# Patient Record
Sex: MALE | Race: OTHER | URBAN - METROPOLITAN AREA
[De-identification: names, ages, dates, MRNs, and addresses within clinical notes are randomized per-mention and may not be internally consistent; named-entity substitution may affect disease eponyms.]

---

## 2019-07-19 ENCOUNTER — INPATIENT (INPATIENT)
Facility: HOSPITAL | Age: 34
LOS: 9 days | Discharge: HOME | End: 2019-07-29
Attending: INTERNAL MEDICINE | Admitting: INTERNAL MEDICINE
Payer: MEDICAID

## 2019-07-19 VITALS
HEART RATE: 135 BPM | WEIGHT: 244.93 LBS | RESPIRATION RATE: 22 BRPM | DIASTOLIC BLOOD PRESSURE: 85 MMHG | SYSTOLIC BLOOD PRESSURE: 132 MMHG | OXYGEN SATURATION: 97 % | TEMPERATURE: 101 F | HEIGHT: 74 IN

## 2019-07-19 DIAGNOSIS — A41.9 SEPSIS, UNSPECIFIED ORGANISM: ICD-10-CM

## 2019-07-19 DIAGNOSIS — R19.7 DIARRHEA, UNSPECIFIED: ICD-10-CM

## 2019-07-19 DIAGNOSIS — E87.1 HYPO-OSMOLALITY AND HYPONATREMIA: ICD-10-CM

## 2019-07-19 DIAGNOSIS — R50.9 FEVER, UNSPECIFIED: ICD-10-CM

## 2019-07-19 DIAGNOSIS — J18.1 LOBAR PNEUMONIA, UNSPECIFIED ORGANISM: ICD-10-CM

## 2019-07-19 LAB
ALBUMIN SERPL ELPH-MCNC: 3.4 G/DL — SIGNIFICANT CHANGE UP (ref 3.3–5)
ALP SERPL-CCNC: 76 U/L — SIGNIFICANT CHANGE UP (ref 40–120)
ALT FLD-CCNC: 43 U/L — SIGNIFICANT CHANGE UP (ref 12–78)
ANION GAP SERPL CALC-SCNC: 9 MMOL/L — SIGNIFICANT CHANGE UP (ref 5–17)
APTT BLD: 29.1 SEC — SIGNIFICANT CHANGE UP (ref 27.5–36.3)
AST SERPL-CCNC: 26 U/L — SIGNIFICANT CHANGE UP (ref 15–37)
BASOPHILS # BLD AUTO: 0.02 K/UL — SIGNIFICANT CHANGE UP (ref 0–0.2)
BASOPHILS NFR BLD AUTO: 0.2 % — SIGNIFICANT CHANGE UP (ref 0–2)
BILIRUB SERPL-MCNC: 0.7 MG/DL — SIGNIFICANT CHANGE UP (ref 0.2–1.2)
BUN SERPL-MCNC: 10 MG/DL — SIGNIFICANT CHANGE UP (ref 7–23)
CALCIUM SERPL-MCNC: 8.8 MG/DL — SIGNIFICANT CHANGE UP (ref 8.5–10.1)
CHLORIDE SERPL-SCNC: 98 MMOL/L — SIGNIFICANT CHANGE UP (ref 96–108)
CO2 SERPL-SCNC: 24 MMOL/L — SIGNIFICANT CHANGE UP (ref 22–31)
CREAT SERPL-MCNC: 1.54 MG/DL — HIGH (ref 0.5–1.3)
EOSINOPHIL # BLD AUTO: 0 K/UL — SIGNIFICANT CHANGE UP (ref 0–0.5)
EOSINOPHIL NFR BLD AUTO: 0 % — SIGNIFICANT CHANGE UP (ref 0–6)
GLUCOSE SERPL-MCNC: 155 MG/DL — HIGH (ref 70–99)
HCT VFR BLD CALC: 44.4 % — SIGNIFICANT CHANGE UP (ref 39–50)
HGB BLD-MCNC: 14.6 G/DL — SIGNIFICANT CHANGE UP (ref 13–17)
IMM GRANULOCYTES NFR BLD AUTO: 1.1 % — SIGNIFICANT CHANGE UP (ref 0–1.5)
INR BLD: 1.34 RATIO — HIGH (ref 0.88–1.16)
LACTATE SERPL-SCNC: 1.5 MMOL/L — SIGNIFICANT CHANGE UP (ref 0.7–2)
LACTATE SERPL-SCNC: 3.5 MMOL/L — HIGH (ref 0.7–2)
LYMPHOCYTES # BLD AUTO: 0.86 K/UL — LOW (ref 1–3.3)
LYMPHOCYTES # BLD AUTO: 7 % — LOW (ref 13–44)
MCHC RBC-ENTMCNC: 28.8 PG — SIGNIFICANT CHANGE UP (ref 27–34)
MCHC RBC-ENTMCNC: 32.9 GM/DL — SIGNIFICANT CHANGE UP (ref 32–36)
MCV RBC AUTO: 87.6 FL — SIGNIFICANT CHANGE UP (ref 80–100)
MONOCYTES # BLD AUTO: 0.73 K/UL — SIGNIFICANT CHANGE UP (ref 0–0.9)
MONOCYTES NFR BLD AUTO: 6 % — SIGNIFICANT CHANGE UP (ref 2–14)
NEUTROPHILS # BLD AUTO: 10.51 K/UL — HIGH (ref 1.8–7.4)
NEUTROPHILS NFR BLD AUTO: 85.7 % — HIGH (ref 43–77)
NRBC # BLD: 0 /100 WBCS — SIGNIFICANT CHANGE UP (ref 0–0)
PLATELET # BLD AUTO: 142 K/UL — LOW (ref 150–400)
POTASSIUM SERPL-MCNC: 3.3 MMOL/L — LOW (ref 3.5–5.3)
POTASSIUM SERPL-SCNC: 3.3 MMOL/L — LOW (ref 3.5–5.3)
PROT SERPL-MCNC: 8.4 GM/DL — HIGH (ref 6–8.3)
PROTHROM AB SERPL-ACNC: 15.1 SEC — HIGH (ref 10–12.9)
RBC # BLD: 5.07 M/UL — SIGNIFICANT CHANGE UP (ref 4.2–5.8)
RBC # FLD: 13.4 % — SIGNIFICANT CHANGE UP (ref 10.3–14.5)
SODIUM SERPL-SCNC: 131 MMOL/L — LOW (ref 135–145)
WBC # BLD: 12.25 K/UL — HIGH (ref 3.8–10.5)
WBC # FLD AUTO: 12.25 K/UL — HIGH (ref 3.8–10.5)

## 2019-07-19 PROCEDURE — 99223 1ST HOSP IP/OBS HIGH 75: CPT

## 2019-07-19 PROCEDURE — 74177 CT ABD & PELVIS W/CONTRAST: CPT | Mod: 26

## 2019-07-19 PROCEDURE — 99285 EMERGENCY DEPT VISIT HI MDM: CPT

## 2019-07-19 PROCEDURE — 71045 X-RAY EXAM CHEST 1 VIEW: CPT | Mod: 26

## 2019-07-19 RX ORDER — POTASSIUM CHLORIDE 20 MEQ
40 PACKET (EA) ORAL ONCE
Refills: 0 | Status: COMPLETED | OUTPATIENT
Start: 2019-07-19 | End: 2019-07-19

## 2019-07-19 RX ORDER — SODIUM CHLORIDE 9 MG/ML
1000 INJECTION INTRAMUSCULAR; INTRAVENOUS; SUBCUTANEOUS
Refills: 0 | Status: DISCONTINUED | OUTPATIENT
Start: 2019-07-19 | End: 2019-07-20

## 2019-07-19 RX ORDER — SODIUM CHLORIDE 9 MG/ML
3400 INJECTION INTRAMUSCULAR; INTRAVENOUS; SUBCUTANEOUS ONCE
Refills: 0 | Status: COMPLETED | OUTPATIENT
Start: 2019-07-19 | End: 2019-07-19

## 2019-07-19 RX ORDER — NICOTINE POLACRILEX 2 MG
1 GUM BUCCAL DAILY
Refills: 0 | Status: DISCONTINUED | OUTPATIENT
Start: 2019-07-19 | End: 2019-07-19

## 2019-07-19 RX ORDER — PIPERACILLIN AND TAZOBACTAM 4; .5 G/20ML; G/20ML
3.38 INJECTION, POWDER, LYOPHILIZED, FOR SOLUTION INTRAVENOUS ONCE
Refills: 0 | Status: COMPLETED | OUTPATIENT
Start: 2019-07-19 | End: 2019-07-19

## 2019-07-19 RX ORDER — PIPERACILLIN AND TAZOBACTAM 4; .5 G/20ML; G/20ML
3.38 INJECTION, POWDER, LYOPHILIZED, FOR SOLUTION INTRAVENOUS EVERY 8 HOURS
Refills: 0 | Status: DISCONTINUED | OUTPATIENT
Start: 2019-07-19 | End: 2019-07-21

## 2019-07-19 RX ORDER — IBUPROFEN 200 MG
400 TABLET ORAL ONCE
Refills: 0 | Status: COMPLETED | OUTPATIENT
Start: 2019-07-19 | End: 2019-07-19

## 2019-07-19 RX ORDER — SODIUM CHLORIDE 9 MG/ML
1000 INJECTION INTRAMUSCULAR; INTRAVENOUS; SUBCUTANEOUS ONCE
Refills: 0 | Status: COMPLETED | OUTPATIENT
Start: 2019-07-19 | End: 2019-07-19

## 2019-07-19 RX ORDER — AZITHROMYCIN 500 MG/1
500 TABLET, FILM COATED ORAL ONCE
Refills: 0 | Status: COMPLETED | OUTPATIENT
Start: 2019-07-19 | End: 2019-07-19

## 2019-07-19 RX ORDER — IOHEXOL 300 MG/ML
30 INJECTION, SOLUTION INTRAVENOUS ONCE
Refills: 0 | Status: COMPLETED | OUTPATIENT
Start: 2019-07-19 | End: 2019-07-19

## 2019-07-19 RX ORDER — AZITHROMYCIN 500 MG/1
500 TABLET, FILM COATED ORAL EVERY 24 HOURS
Refills: 0 | Status: DISCONTINUED | OUTPATIENT
Start: 2019-07-20 | End: 2019-07-21

## 2019-07-19 RX ORDER — NICOTINE POLACRILEX 2 MG
1 GUM BUCCAL DAILY
Refills: 0 | Status: DISCONTINUED | OUTPATIENT
Start: 2019-07-19 | End: 2019-07-29

## 2019-07-19 RX ORDER — ACETAMINOPHEN 500 MG
650 TABLET ORAL ONCE
Refills: 0 | Status: COMPLETED | OUTPATIENT
Start: 2019-07-19 | End: 2019-07-19

## 2019-07-19 RX ORDER — ACETAMINOPHEN 500 MG
650 TABLET ORAL EVERY 6 HOURS
Refills: 0 | Status: DISCONTINUED | OUTPATIENT
Start: 2019-07-19 | End: 2019-07-29

## 2019-07-19 RX ORDER — IPRATROPIUM/ALBUTEROL SULFATE 18-103MCG
3 AEROSOL WITH ADAPTER (GRAM) INHALATION EVERY 6 HOURS
Refills: 0 | Status: DISCONTINUED | OUTPATIENT
Start: 2019-07-19 | End: 2019-07-28

## 2019-07-19 RX ADMIN — SODIUM CHLORIDE 100 MILLILITER(S): 9 INJECTION INTRAMUSCULAR; INTRAVENOUS; SUBCUTANEOUS at 20:10

## 2019-07-19 RX ADMIN — AZITHROMYCIN 255 MILLIGRAM(S): 500 TABLET, FILM COATED ORAL at 20:02

## 2019-07-19 RX ADMIN — Medication 200 MILLIGRAM(S): at 23:16

## 2019-07-19 RX ADMIN — Medication 650 MILLIGRAM(S): at 18:45

## 2019-07-19 RX ADMIN — IOHEXOL 30 MILLILITER(S): 300 INJECTION, SOLUTION INTRAVENOUS at 16:30

## 2019-07-19 RX ADMIN — PIPERACILLIN AND TAZOBACTAM 200 GRAM(S): 4; .5 INJECTION, POWDER, LYOPHILIZED, FOR SOLUTION INTRAVENOUS at 18:44

## 2019-07-19 RX ADMIN — SODIUM CHLORIDE 1000 MILLILITER(S): 9 INJECTION INTRAMUSCULAR; INTRAVENOUS; SUBCUTANEOUS at 19:08

## 2019-07-19 RX ADMIN — Medication 40 MILLIEQUIVALENT(S): at 20:52

## 2019-07-19 RX ADMIN — Medication 400 MILLIGRAM(S): at 23:16

## 2019-07-19 RX ADMIN — Medication 650 MILLIGRAM(S): at 16:46

## 2019-07-19 RX ADMIN — Medication 650 MILLIGRAM(S): at 20:10

## 2019-07-19 RX ADMIN — SODIUM CHLORIDE 6800 MILLILITER(S): 9 INJECTION INTRAMUSCULAR; INTRAVENOUS; SUBCUTANEOUS at 16:46

## 2019-07-19 NOTE — H&P ADULT - PROBLEM SELECTOR PLAN 1
- blood and urine culture.  - IVF hydration  - IV antibiotics.  - pulmonary eval  - serial lactic acid level

## 2019-07-19 NOTE — ED ADULT NURSE NOTE - OBJECTIVE STATEMENT
Pt c/o upper abdominal pain with nausea denies vomiting with diarrhea for 2 days. Pt c/o fever with chills, c/o unproductive coughing at night. pt c/o lower back pain, denies any injury

## 2019-07-19 NOTE — SBIRT NOTE ADULT - NSSBIRTBRIEFINTDET_GEN_A_CORE
educated pt on healthy guidelines and that he is an the borderline of  at risk behaviors. Pt was receptive to and appreciative of information and motivated to make changes that were reviewed as well.

## 2019-07-19 NOTE — ED ADULT NURSE NOTE - NSIMPLEMENTINTERV_GEN_ALL_ED
Implemented All Universal Safety Interventions:  [place] to call system. Call bell, personal items and telephone within reach. Instruct patient to call for assistance. Room bathroom lighting operational. Non-slip footwear when patient is off stretcher. Physically safe environment: no spills, clutter or unnecessary equipment. Stretcher in lowest position, wheels locked, appropriate side rails in place.

## 2019-07-19 NOTE — H&P ADULT - HISTORY OF PRESENT ILLNESS
33 years old male here c/o fever nausea and non-bloody diarrhea with diffused abd pain since yesterday. Pt denies headache, dizziness, blurred vision, light sensitivities, focal/distal weakness or numbness, neck/back pain, cough, sob, chest pain, vomiting, dysuria, hematuria or irregular bowel movements. Pt also denies recent traveling.   CXR showed RLL infiltrates, Abd CT revealed RLL PNA, he was started on IVF, IV antibiotics.

## 2019-07-19 NOTE — SBIRT NOTE ADULT - NSSBIRTDRGBRIEFINTDET_GEN_A_CORE
education provided and reviewed medical risk of smoking with PNA. Pt seemed surprised and added that he should "probably quit smoking cigarettes then, too".  Smoking cessaTION STRATEGIES WERE ALSO REVIEWED.

## 2019-07-19 NOTE — ED ADULT NURSE NOTE - ED STAT RN HANDOFF DETAILS
Report received from LIANNA Raymond at 7pm. Assessment available on [facility]. will continue to monitor.

## 2019-07-19 NOTE — H&P ADULT - NSHPPHYSICALEXAM_GEN_ALL_CORE
GENERAL: NAD, well-groomed, well-developed  HEAD:  Atraumatic, Normocephalic  EYES: EOMI, PERRLA, conjunctiva and sclera clear  ENMT: No tonsillar erythema, exudates, or enlargement; Moist mucous membranes   NECK: Supple, No JVD   NERVOUS SYSTEM:  Alert & Oriented X3, Good concentration; Motor Strength 5/5 B/L upper and lower extremities; DTRs 2+ intact and symmetric  CHEST/LUNG: Clear to auscultation  bilaterally; No rales, rhonchi, wheezing, or rubs  HEART: Regular rate and rhythm; No murmurs, rubs, or gallops  ABDOMEN: Soft, Nontender, Nondistended; Bowel sounds present  EXTREMITIES:  2+ Peripheral Pulses, No clubbing, cyanosis, or edema  LYMPH: No lymphadenopathy noted  SKIN: No rashes or lesions

## 2019-07-19 NOTE — H&P ADULT - NSHPREVIEWOFSYSTEMS_GEN_ALL_CORE
REVIEW OF SYSTEMS:  CONSTITUTIONAL:   fatigue  EYES: No eye pain, visual disturbances, or discharge  ENMT:  No difficulty hearing, tinnitus, vertigo; No sinus or throat pain  NECK: No pain or stiffness  RESPIRATORY: No cough, wheezing, chills or hemoptysis; No shortness of breath  CARDIOVASCULAR: No chest pain, palpitations, dizziness, or leg swelling  GASTROINTESTINAL:  diarrhea. No melena or hematochezia.  GENITOURINARY: No dysuria, frequency, hematuria, or incontinence  NEUROLOGICAL: No headaches, memory loss, loss of strength, numbness, or tremors  SKIN: No itching, burning, rashes, or lesions

## 2019-07-19 NOTE — ED ADULT TRIAGE NOTE - CHIEF COMPLAINT QUOTE
Pt with fever,  feeling achy, dizziness, weakness, and diarrhea " a lot ", since yesterday  Pain to knees, lower back pain and unable to sleep for 2 days Nausea this am

## 2019-07-19 NOTE — ED PROVIDER NOTE - OBJECTIVE STATEMENT
33 years old male here c/o fever nausea and non bloody diarrhea with diffused abd pain since yesterday. Pt denies headache, dizziness, blurred visions, light sensitivities, focal/distal weakness or numbness, neck/back pain, cough, sob, chest pain, vomiting, dysuria, hematuria or irregular bowel movements. Pt also denies recent traveling.

## 2019-07-20 DIAGNOSIS — R06.02 SHORTNESS OF BREATH: ICD-10-CM

## 2019-07-20 DIAGNOSIS — Z29.9 ENCOUNTER FOR PROPHYLACTIC MEASURES, UNSPECIFIED: ICD-10-CM

## 2019-07-20 DIAGNOSIS — E87.6 HYPOKALEMIA: ICD-10-CM

## 2019-07-20 LAB
ALBUMIN SERPL ELPH-MCNC: 2.8 G/DL — LOW (ref 3.3–5)
ALP SERPL-CCNC: 63 U/L — SIGNIFICANT CHANGE UP (ref 40–120)
ALT FLD-CCNC: 43 U/L — SIGNIFICANT CHANGE UP (ref 12–78)
ANION GAP SERPL CALC-SCNC: 12 MMOL/L — SIGNIFICANT CHANGE UP (ref 5–17)
AST SERPL-CCNC: 60 U/L — HIGH (ref 15–37)
BASE EXCESS BLDA CALC-SCNC: -2.8 MMOL/L — LOW (ref -2–2)
BILIRUB SERPL-MCNC: 0.8 MG/DL — SIGNIFICANT CHANGE UP (ref 0.2–1.2)
BLOOD GAS COMMENTS: SIGNIFICANT CHANGE UP
BLOOD GAS COMMENTS: SIGNIFICANT CHANGE UP
BLOOD GAS SOURCE: SIGNIFICANT CHANGE UP
BUN SERPL-MCNC: 6 MG/DL — LOW (ref 7–23)
CALCIUM SERPL-MCNC: 7.3 MG/DL — LOW (ref 8.5–10.1)
CHLORIDE SERPL-SCNC: 104 MMOL/L — SIGNIFICANT CHANGE UP (ref 96–108)
CHOLEST SERPL-MCNC: 144 MG/DL — SIGNIFICANT CHANGE UP (ref 10–199)
CO2 SERPL-SCNC: 20 MMOL/L — LOW (ref 22–31)
CREAT SERPL-MCNC: 1.1 MG/DL — SIGNIFICANT CHANGE UP (ref 0.5–1.3)
D DIMER BLD IA.RAPID-MCNC: 1238 NG/ML DDU — HIGH
GLUCOSE SERPL-MCNC: 110 MG/DL — HIGH (ref 70–99)
HCO3 BLDA-SCNC: 19 MMOL/L — LOW (ref 21–29)
HCT VFR BLD CALC: 39.8 % — SIGNIFICANT CHANGE UP (ref 39–50)
HDLC SERPL-MCNC: 30 MG/DL — LOW
HGB BLD-MCNC: 13.1 G/DL — SIGNIFICANT CHANGE UP (ref 13–17)
HOROWITZ INDEX BLDA+IHG-RTO: 0.4 — SIGNIFICANT CHANGE UP
LIPID PNL WITH DIRECT LDL SERPL: 70 MG/DL — SIGNIFICANT CHANGE UP
MCHC RBC-ENTMCNC: 28.7 PG — SIGNIFICANT CHANGE UP (ref 27–34)
MCHC RBC-ENTMCNC: 32.9 GM/DL — SIGNIFICANT CHANGE UP (ref 32–36)
MCV RBC AUTO: 87.3 FL — SIGNIFICANT CHANGE UP (ref 80–100)
NRBC # BLD: 0 /100 WBCS — SIGNIFICANT CHANGE UP (ref 0–0)
PCO2 BLDA: 25 MMHG — LOW (ref 32–46)
PH BLD: 7.49 — HIGH (ref 7.35–7.45)
PLATELET # BLD AUTO: 115 K/UL — LOW (ref 150–400)
PO2 BLDA: 70 MMHG — LOW (ref 74–108)
POTASSIUM SERPL-MCNC: 3.4 MMOL/L — LOW (ref 3.5–5.3)
POTASSIUM SERPL-SCNC: 3.4 MMOL/L — LOW (ref 3.5–5.3)
PROCALCITONIN SERPL-MCNC: 2.1 NG/ML — HIGH (ref 0.02–0.1)
PROT SERPL-MCNC: 6.8 GM/DL — SIGNIFICANT CHANGE UP (ref 6–8.3)
RBC # BLD: 4.56 M/UL — SIGNIFICANT CHANGE UP (ref 4.2–5.8)
RBC # FLD: 13.5 % — SIGNIFICANT CHANGE UP (ref 10.3–14.5)
SAO2 % BLDA: 96 % — SIGNIFICANT CHANGE UP (ref 92–96)
SODIUM SERPL-SCNC: 136 MMOL/L — SIGNIFICANT CHANGE UP (ref 135–145)
TOTAL CHOLESTEROL/HDL RATIO MEASUREMENT: 4.8 RATIO — SIGNIFICANT CHANGE UP (ref 3.4–9.6)
TRIGL SERPL-MCNC: 219 MG/DL — HIGH (ref 10–149)
WBC # BLD: 11 K/UL — HIGH (ref 3.8–10.5)
WBC # FLD AUTO: 11 K/UL — HIGH (ref 3.8–10.5)

## 2019-07-20 PROCEDURE — 99221 1ST HOSP IP/OBS SF/LOW 40: CPT | Mod: GC

## 2019-07-20 PROCEDURE — 99233 SBSQ HOSP IP/OBS HIGH 50: CPT

## 2019-07-20 RX ORDER — ENOXAPARIN SODIUM 100 MG/ML
40 INJECTION SUBCUTANEOUS DAILY
Refills: 0 | Status: DISCONTINUED | OUTPATIENT
Start: 2019-07-20 | End: 2019-07-21

## 2019-07-20 RX ORDER — POTASSIUM CHLORIDE 20 MEQ
40 PACKET (EA) ORAL ONCE
Refills: 0 | Status: COMPLETED | OUTPATIENT
Start: 2019-07-20 | End: 2019-07-20

## 2019-07-20 RX ORDER — IBUPROFEN 200 MG
400 TABLET ORAL ONCE
Refills: 0 | Status: COMPLETED | OUTPATIENT
Start: 2019-07-20 | End: 2019-07-20

## 2019-07-20 RX ORDER — METRONIDAZOLE 500 MG
500 TABLET ORAL EVERY 8 HOURS
Refills: 0 | Status: DISCONTINUED | OUTPATIENT
Start: 2019-07-20 | End: 2019-07-21

## 2019-07-20 RX ORDER — SODIUM CHLORIDE 9 MG/ML
1000 INJECTION INTRAMUSCULAR; INTRAVENOUS; SUBCUTANEOUS
Refills: 0 | Status: COMPLETED | OUTPATIENT
Start: 2019-07-20 | End: 2019-07-20

## 2019-07-20 RX ADMIN — Medication 1 PATCH: at 11:42

## 2019-07-20 RX ADMIN — PIPERACILLIN AND TAZOBACTAM 25 GRAM(S): 4; .5 INJECTION, POWDER, LYOPHILIZED, FOR SOLUTION INTRAVENOUS at 13:35

## 2019-07-20 RX ADMIN — Medication 3 MILLILITER(S): at 17:54

## 2019-07-20 RX ADMIN — Medication 200 MILLIGRAM(S): at 11:42

## 2019-07-20 RX ADMIN — Medication 650 MILLIGRAM(S): at 06:30

## 2019-07-20 RX ADMIN — Medication 400 MILLIGRAM(S): at 16:30

## 2019-07-20 RX ADMIN — AZITHROMYCIN 255 MILLIGRAM(S): 500 TABLET, FILM COATED ORAL at 16:34

## 2019-07-20 RX ADMIN — Medication 400 MILLIGRAM(S): at 00:16

## 2019-07-20 RX ADMIN — Medication 500 MILLIGRAM(S): at 22:41

## 2019-07-20 RX ADMIN — Medication 200 MILLIGRAM(S): at 05:58

## 2019-07-20 RX ADMIN — SODIUM CHLORIDE 100 MILLILITER(S): 9 INJECTION INTRAMUSCULAR; INTRAVENOUS; SUBCUTANEOUS at 08:29

## 2019-07-20 RX ADMIN — Medication 650 MILLIGRAM(S): at 20:45

## 2019-07-20 RX ADMIN — PIPERACILLIN AND TAZOBACTAM 25 GRAM(S): 4; .5 INJECTION, POWDER, LYOPHILIZED, FOR SOLUTION INTRAVENOUS at 05:57

## 2019-07-20 RX ADMIN — SODIUM CHLORIDE 80 MILLILITER(S): 9 INJECTION INTRAMUSCULAR; INTRAVENOUS; SUBCUTANEOUS at 20:01

## 2019-07-20 RX ADMIN — Medication 3 MILLILITER(S): at 11:13

## 2019-07-20 RX ADMIN — Medication 650 MILLIGRAM(S): at 20:15

## 2019-07-20 RX ADMIN — SODIUM CHLORIDE 80 MILLILITER(S): 9 INJECTION INTRAMUSCULAR; INTRAVENOUS; SUBCUTANEOUS at 15:13

## 2019-07-20 RX ADMIN — PIPERACILLIN AND TAZOBACTAM 25 GRAM(S): 4; .5 INJECTION, POWDER, LYOPHILIZED, FOR SOLUTION INTRAVENOUS at 22:41

## 2019-07-20 RX ADMIN — Medication 500 MILLIGRAM(S): at 13:35

## 2019-07-20 RX ADMIN — Medication 650 MILLIGRAM(S): at 15:13

## 2019-07-20 RX ADMIN — Medication 650 MILLIGRAM(S): at 05:57

## 2019-07-20 RX ADMIN — Medication 3 MILLILITER(S): at 00:11

## 2019-07-20 RX ADMIN — Medication 30 MILLILITER(S): at 20:01

## 2019-07-20 RX ADMIN — Medication 650 MILLIGRAM(S): at 13:35

## 2019-07-20 RX ADMIN — Medication 40 MILLIEQUIVALENT(S): at 11:42

## 2019-07-20 NOTE — CHART NOTE - NSCHARTNOTEFT_GEN_A_CORE
Patient was evaluated by ICU team, as per ICU eval he is stable to remain on 2D for now.    I was informed by nursing staff that the hypothermia blanket temp control is defective,   the patient's temp may not be accurate, they are working to have it fixed or to get a better one.

## 2019-07-20 NOTE — CONSULT NOTE ADULT - ASSESSMENT
HPI:  33 years old male here c/o fever nausea and non-bloody diarrhea with diffused abd pain since yesterday. Pt denies headache, dizziness, blurred vision, light sensitivities, focal/distal weakness or numbness, neck/back pain, cough, sob, chest pain, vomiting, dysuria, hematuria or irregular bowel movements. Pt also denies recent traveling.   CXR showed RLL infiltrates, Abd CT revealed RLL PNA, he was started on IVF, IV antibiotics. (19 Jul 2019 18:39)  ------------ As Above -----------------------------------  Patient seen earlier today. Patient is a poor historian. Patient had been in his USOH until Wednesday when he developed below rib pain, abdominal pain, back pain, and knee pain. Chills (+). Later that day, he developed diarrhea. Symptoms continued and he came to the ER.  Consult called for diarrhea.  His girlfriend does not have the same symptoms ( ate the same foods ) Sister has a cold. No recent antibiotics. No recent travel. Non bloody.  Prior to Wednesday, the patient denies melena, hematochezia, hematemesis, nausea, vomiting, abdominal pain, constipation, diarrhea, or change in bowel movements No family history of  IBD  In the hospital, he was started on Zithromax, Flagyl PO and Zosyn.   Stool studies pending  ( Note : Patient denies SOB on admission until the very end of the consultation < nasal cannula > )     Diarrhea - r/o infectious , doubt IBD, ischemiea, etc. - Stool for C Diff, O&P, CX pending. 1) Check stool samples 2)Pepto TID x 3 3) clear liquid diet

## 2019-07-20 NOTE — PROGRESS NOTE ADULT - SUBJECTIVE AND OBJECTIVE BOX
Patient is a 33y old  Male who presents with a chief complaint of SOB, PNA, complain of feeling SOB, having more diarrhea, and knee pain.	     OVERNIGHT EVENTS: none    MEDICATIONS  (STANDING):  ALBUTerol/ipratropium for Nebulization 3 milliLiter(s) Nebulizer every 6 hours  azithromycin  IVPB 500 milliGRAM(s) IV Intermittent every 24 hours  metroNIDAZOLE    Tablet 500 milliGRAM(s) Oral every 8 hours  nicotine -  14 mG/24Hr(s) Patch 1 patch Transdermal daily  piperacillin/tazobactam IVPB.. 3.375 Gram(s) IV Intermittent every 8 hours  sodium chloride 0.9%. 1000 milliLiter(s) (100 mL/Hr) IV Continuous <Continuous>    MEDICATIONS  (PRN):  acetaminophen   Tablet .. 650 milliGRAM(s) Oral every 6 hours PRN Temp greater or equal to 38C (100.4F), Mild Pain (1 - 3)  guaiFENesin    Syrup 200 milliGRAM(s) Oral every 6 hours PRN Cough        REVIEW OF SYSTEMS:  CONSTITUTIONAL: No fever, weight loss, or fatigue  EYES: No eye pain, visual disturbances, or discharge  ENMT:  No difficulty hearing, tinnitus, vertigo;    NECK: No pain or stiffness  RESPIRATORY: No cough, wheezing, chills or hemoptysis; No shortness of breath  CARDIOVASCULAR: No chest pain, palpitations, dizziness, or leg swelling  GASTROINTESTINAL:  DIARRHEA  GENITOURINARY: No dysuria, frequency,    NEUROLOGICAL: No headaches,   SKIN: No itching, burning, rashes, or lesions      Vital Signs Last 24 Hrs  T(C): 39.3 (20 Jul 2019 05:21), Max: 39.5 (19 Jul 2019 22:00)  T(F): 102.8 (20 Jul 2019 05:21), Max: 103.1 (19 Jul 2019 22:00)  HR: 113 (20 Jul 2019 05:21) (107 - 135)  BP: 127/82 (20 Jul 2019 05:21) (118/69 - 142/79)  BP(mean): --  RR: 18 (20 Jul 2019 05:21) (16 - 22)  SpO2: 94% (20 Jul 2019 05:21) (94% - 97%)    PHYSICAL EXAM:  GENERAL: NAD, well-groomed, well-developed  HEAD:  Atraumatic, Normocephalic  EYES: EOMI, PERRLA, conjunctiva and sclera clear  ENMT: No tonsillar erythema, exudates, or enlargement; Moist mucous membranes   NECK: Supple, No JVD   NERVOUS SYSTEM:  Alert & Oriented X3, Good concentration; Motor Strength 5/5 B/L upper and lower extremities; DTRs 2+ intact and symmetric  CHEST/LUNG: Decreased breath sounds at bases, no rales, rhonchi, wheezing, or rubs  HEART: Regular rate and rhythm; No murmurs, rubs, or gallops  ABDOMEN: Soft, Nontender, Nondistended; Bowel sounds present  EXTREMITIES:  2+ Peripheral Pulses, No clubbing, cyanosis, or edema  LYMPH: No lymphadenopathy noted  SKIN: No rashes or lesions    LABS:                        13.1   11.00 )-----------( 115      ( 20 Jul 2019 07:11 )             39.8     07-20    136  |  104  |  6<L>  ----------------------------<  110<H>  3.4<L>   |  20<L>  |  1.10    Ca    7.3<L>      20 Jul 2019 07:11    TPro  6.8  /  Alb  2.8<L>  /  TBili  0.8  /  DBili  x   /  AST  60<H>  /  ALT  43  /  AlkPhos  63  07-20    PT/INR - ( 19 Jul 2019 16:37 )   PT: 15.1 sec;   INR: 1.34 ratio         PTT - ( 19 Jul 2019 16:37 )  PTT:29.1 sec   cardiac markers     CAPILLARY BLOOD GLUCOSE        Cultures    RADIOLOGY & ADDITIONAL TESTS:    Imaging Personally Reviewed:  [ ] YES  [ ] NO    Consultant(s) Notes Reviewed:  [ ] YES  [ ] NO    Care Discussed with Consultants/Other Providers [ ] YES  [ ] NO

## 2019-07-20 NOTE — CONSULT NOTE ADULT - ASSESSMENT
Assessment:   Sepsis due to pneumonia: on zosyn, flagyl and azithromycin.   Elevated temperature  Respiratory distress that improved with BIPAP        Plan:   Continue BIPAP as pt feels better  FiO2 increased to 50 % to maintain saturation around 97 %  Tylenol and/ or motrin as needed for fever.   Cooling blanket for elevated temp.   Continue abx and iv fluids as needed.   Patient doesn't meet criteria to be in ICU at this time, if condition worsens please recall ICU. Assessment:   Sepsis due to pneumonia: on zosyn, flagyl and azithromycin.   Elevated temperature  Respiratory distress that improved with BIPAP        Recommedation:   Continue BIPAP as pt feels better with it on.   FiO2 increased to 50 % to maintain saturation around 97 %  Tylenol and/ or motrin as needed for fever.   Cooling blanket for elevated temp.   Continue abx and iv fluids as needed.   Patient doesn't meet criteria to be in ICU at this time, if condition worsens please recall ICU.

## 2019-07-20 NOTE — CONSULT NOTE ADULT - ATTENDING COMMENTS
Pt seen and examined on medical floor 7/20.    33M with no PMH, active cigarette smoker, admits to snorting cocaine 1 month ago recreationally, and uses marijuana presents for 4-5 days of bilateral knee arthralgias and myalgias now with 1 day hx of SOB, cough, and hemoptysis reported by pt. Pt also reports loose stool. Found to be febrile. CXR with R infiltrate. Hypoxic with tachypnea placed on biPAP by medical team. Treated for sepsis secondary to PNA with hypoxic respiratory failure.     - pt currently awake, responsive in no distress on biPAP  - FIO2 40% with O2 sat in the low 90s.   - Fio2 increased to 50% with O2 sat improvement to 96-98%  - pt reports breathing improved with BiPAP, talking full sentences  - pt on azithromycin and zosyn  - would check sputum cx  - blood and u cx as well  - hemodynamically stable at present time in no respiratory distress on non invasive ventilation  - pulmonary follow up  - antipyretics with tylenol and motrin, cooling blanket  - no need for ICU transfer at present time, reconsult for any changes in condition

## 2019-07-20 NOTE — PATIENT PROFILE ADULT - NSPRESCRUSEDDRG_GEN_A_NUR
I have reviewed and confirmed nurses' notes for patient's medications, allergies, medical history, and surgical history.
No

## 2019-07-20 NOTE — CONSULT NOTE ADULT - SUBJECTIVE AND OBJECTIVE BOX
Patient is a 33y old  Male who presents with a chief complaint of admitted to hospital for pneumonia (20 Jul 2019 16:22)    HPI:  33 years old male with no significant medical history here c/o fever nausea and non-bloody diarrhea with diffused abd pain for one day. Started about 4 days prior to that with right lower chest, back and knee pain along with profuse diarrhea. Also with diffuse body ache, sweating and chills. Then started with sob.  Admitted with RLL pneumonia.  07/20/19: With increasing Respiratory distress, temperature up to 104 with chills. RRT was called, got started on BIPAP support with some improvement.   Active smoker 1 ppd. uses alcohol and Marijuana socially.    PAST MEDICAL & SURGICAL HISTORY:  No pertinent family history  No significant past surgical history    FAMILY HISTORY:  FH: HTN (hypertension)    SOCIAL HISTORY: BMI (kg/m2): 29.6 .active smoker.    Allergies  No Known Allergies    MEDICATIONS  (STANDING):  ALBUTerol/ipratropium for Nebulization 3 milliLiter(s) Nebulizer every 6 hours  azithromycin  IVPB 500 milliGRAM(s) IV Intermittent every 24 hours  metroNIDAZOLE    Tablet 500 milliGRAM(s) Oral every 8 hours  nicotine -  14 mG/24Hr(s) Patch 1 patch Transdermal daily  piperacillin/tazobactam IVPB.. 3.375 Gram(s) IV Intermittent every 8 hours  sodium chloride 0.9%. 1000 milliLiter(s) (80 mL/Hr) IV Continuous <Continuous>    MEDICATIONS  (PRN):  acetaminophen   Tablet .. 650 milliGRAM(s) Oral every 6 hours PRN Temp greater or equal to 38C (100.4F), Mild Pain (1 - 3)  guaiFENesin    Syrup 200 milliGRAM(s) Oral every 6 hours PRN Cough    REVIEW OF SYSTEMS:    Constitutional:            fever, and  fatigue  HEENT:                       No difficulty hearing, tinnitus, vertigo; No sinus or throat pain  Respiratory:               sob, chest pain.  Cardiovascular:           chest pain,   Gastrointestinal:        abdominal and  epigastric pain. diarrhea  Genitourinary:            No dysuria, frequency, hematuria or incontinence  SKIN:                          no rash  Musculoskeletal:        knee pain  Extremities:                No swelling  Neurological:              No headaches  Psychiatric:                 No depression, anxiety    Vital Signs Last 24 Hrs  T(C): 39.4 (20 Jul 2019 18:49), Max: 40.2 (20 Jul 2019 13:33)  T(F): 102.9 (20 Jul 2019 18:49), Max: 104.4 (20 Jul 2019 13:33)  HR: 107 (20 Jul 2019 18:49) (104 - 120)  BP: 131/62 (20 Jul 2019 18:49) (118/69 - 147/84)  BP(mean): --  RR: 22 (20 Jul 2019 18:49) (16 - 22)  SpO2: 93% (20 Jul 2019 18:49) (93% - 97%)    PHYSICAL EXAM:  GEN:        Awake, responsive and comfortable.  HEENT:     BIPAP.   RESP:       no wheezing.  CVS:          Regular rate and rhythm.   ABD:         Soft, non-tender, non-distended;   :             No costovertebral angle tenderness  SKIN:           Warm and dry.  EXTR:            No clubbing, cyanosis or edema  CNS:              Intact sensory and motor function.  PSYCH:        cooperative, no anxiety or depression    LABS:                        13.1   11.00 )-----------( 115      ( 20 Jul 2019 07:11 )             39.8     07-20    136  |  104  |  6<L>  ----------------------------<  110<H>  3.4<L>   |  20<L>  |  1.10    Ca    7.3<L>      20 Jul 2019 07:11    TPro  6.8  /  Alb  2.8<L>  /  TBili  0.8  /  DBili  x   /  AST  60<H>  /  ALT  43  /  AlkPhos  63  07-20    PT/INR - ( 19 Jul 2019 16:37 )   PT: 15.1 sec;   INR: 1.34 ratio      PTT - ( 19 Jul 2019 16:37 )  PTT:29.1 sec  07-20 @ 15:06  pH: 7.49  pCO2: 25  pO2: 70  SaO2: 96    EKG:  sinus tachycardia.    RADIOLOGY & ADDITIONAL STUDIES:  < from: CT Abdomen and Pelvis w/ Oral Cont and w/ IV Cont (07.19.19 @ 17:54) >    EXAM:  CT ABDOMEN AND PELVIS OC IC                          PROCEDURE DATE:  07/19/2019      INTERPRETATION:  CLINICAL INFORMATION: Fever and abdominal pain, diarrhea    COMPARISON: None.    PROCEDURE:   CT of the Abdomen and Pelvis was performed with intravenous contrast.   Intravenous contrast: 90 ml Omnipaque 350. 10 ml discarded.  Oral contrast: positive contrast was administered.  Sagittal and coronal reformats were performed.    FINDINGS:    LOWER CHEST: Right lower lobe consolidation. Additional scattered basilar   opacities.    LIVER: Low-attenuation, compatible with hepatic steatosis.  BILE DUCTS: Normal caliber.  GALLBLADDER: Within normal limits.  SPLEEN: Within normal limits.  PANCREAS: Within normal limits.  ADRENALS: Within normal limits.  KIDNEYS/URETERS: Subcentimeter hypodense renal lesions, too small to   further characterize. No hydronephrosis.    BLADDER: Within normal limits.  REPRODUCTIVE ORGANS: Prostate within normal limits.    BOWEL: No bowel obstruction. Appendix within normal limits. Colonic   diverticulosis.  PERITONEUM: No ascites.  VESSELS:  Within normal limits.  RETROPERITONEUM: No lymphadenopathy.    ABDOMINAL WALL: Within normal limits.  BONES: Within normal limits.    IMPRESSION:     1. Rightlower lobe pneumonia.  2. No CT evidence of colitis or bowel obstruction.    NENO DONALD M.D., ATTENDING RADIOLOGIST  This document has been electronically signed. Jul 19 2019  6:16PM    < from: Xray Chest 1 View-PORTABLE IMMEDIATE (07.19.19 @ 16:58) >    XAM:  XR CHEST PORTABLE IMMED 1V                          PROCEDURE DATE:  07/19/2019      INTERPRETATION:  AP erect chest on July 19, 2019 at 4:40 PM. Patient has   fever.    COMPARISON: None available.    Heart size is within normal limits.    There is a small consolidated infiltrate in the right base medially.    IMPRESSION: Small right lower lung field infiltrate medially.    CASSANDRA IBARRA M.D., ATTENDING RADIOLOGIST  This document has been electronically signed. Jul 19 2019  5:01PM      ASSESSMENT AND PLAN:  ·	Acute Hypoxic Respiratory failure.  ·	RLL Pneumonia.  ·	Sepsis.  ·	Diarrhea.  ·	Hypokalemia.  ·	Tobacco abuse.  ·	Obesity.    Supplemental O2 and BIPAP support.  Continue Zosyn and Zithromax.  On Flagyl also.  Legionella and Mycoplasma titre.  Replace potassium follow electrolytes.  IV hydration.  Continue nebulizer.

## 2019-07-20 NOTE — CONSULT NOTE ADULT - SUBJECTIVE AND OBJECTIVE BOX
HPI:  33 years old male here c/o fever nausea and non-bloody diarrhea with diffused abd pain since yesterday. Pt denies headache, dizziness, blurred vision, light sensitivities, focal/distal weakness or numbness, neck/back pain, cough, sob, chest pain, vomiting, dysuria, hematuria or irregular bowel movements. Pt also denies recent traveling.   CXR showed RLL infiltrates, Abd CT revealed RLL PNA, he was started on IVF, IV antibiotics. (19 Jul 2019 18:39)  ------------ As Above -----------------------------------  Patient seen earlier today. Patient is a poor historian. Patient had been in his USOH until Wednesday when he developed below rib pain, abdominal pain, back pain, and knee pain. Chills (+). Later that day, he developed diarrhea. Symptoms continued and he came to the ER.  Consult called for diarrhea.  His girlfriend does not have the same symptoms ( ate the same foods ) Sister has a cold. No recent antibiotics. No recent travel. Non bloody.  Prior to Wednesday, the patient denies melena, hematochezia, hematemesis, nausea, vomiting, abdominal pain, constipation, diarrhea, or change in bowel movements No family history of  IBD  In the hospital, he was started on Zithromax, Flagyl PO and Zosyn.   Stool studies pending  ( Note : Patient denies SOB on admission until the very end of the consultation < nasal cannula > )       PAST MEDICAL & SURGICAL HISTORY:  No pertinent family history  No significant past surgical history      MEDICATIONS  (STANDING):  ALBUTerol/ipratropium for Nebulization 3 milliLiter(s) Nebulizer every 6 hours  azithromycin  IVPB 500 milliGRAM(s) IV Intermittent every 24 hours  metroNIDAZOLE    Tablet 500 milliGRAM(s) Oral every 8 hours  nicotine -  14 mG/24Hr(s) Patch 1 patch Transdermal daily  piperacillin/tazobactam IVPB.. 3.375 Gram(s) IV Intermittent every 8 hours  sodium chloride 0.9%. 1000 milliLiter(s) (80 mL/Hr) IV Continuous <Continuous>    MEDICATIONS  (PRN):  acetaminophen   Tablet .. 650 milliGRAM(s) Oral every 6 hours PRN Temp greater or equal to 38C (100.4F), Mild Pain (1 - 3)  guaiFENesin    Syrup 200 milliGRAM(s) Oral every 6 hours PRN Cough      Allergies    No Known Allergies    Intolerances        FAMILY HISTORY:  FH: HTN (hypertension)      REVIEW OF SYSTEMS:    CONSTITUTIONAL: No fever, weight loss, or fatigue  EYES: No eye pain, visual disturbances, or discharge  ENMT:  No difficulty hearing, tinnitus, vertigo; No sinus or throat pain  NECK: No pain or stiffness  BREASTS: No pain, masses, or nipple discharge  RESPIRATORY: No cough, wheezing, chills or hemoptysis;   CARDIOVASCULAR: No chest pain, palpitations, dizziness, or leg swelling  GASTROINTESTINAL: See above  GENITOURINARY: No dysuria, frequency, hematuria, or incontinence  NEUROLOGICAL: No headaches, memory loss, loss of strength, numbness, or tremors  SKIN: No itching, burning, rashes, or lesions   LYMPH NODES: No enlarged glands  ENDOCRINE: No heat or cold intolerance; No hair loss  MUSCULOSKELETAL: No joint pain or swelling; No muscle, back, or extremity pain  PSYCHIATRIC: No depression, anxiety, mood swings, or difficulty sleeping  HEME/LYMPH: No easy bruising, or bleeding gums  ALLERGY AND IMMUNOLOGIC: No hives or eczema          SOCIAL HISTORY:    FAMILY HISTORY:  FH: HTN (hypertension)      Vital Signs Last 24 Hrs  T(C): 39.4 (20 Jul 2019 18:49), Max: 40.2 (20 Jul 2019 13:33)  T(F): 102.9 (20 Jul 2019 18:49), Max: 104.4 (20 Jul 2019 13:33)  HR: 107 (20 Jul 2019 18:49) (104 - 120)  BP: 131/62 (20 Jul 2019 18:49) (118/69 - 147/84)  BP(mean): --  RR: 22 (20 Jul 2019 18:49) (16 - 22)  SpO2: 93% (20 Jul 2019 18:49) (93% - 97%)    PHYSICAL EXAM:    GENERAL: NAD, well-groomed, well-developed  HEAD:  Atraumatic, Normocephalic  EYES: EOMI, PERRLA, conjunctiva and sclera clear  NECK: Supple, No JVD, Normal thyroid  NERVOUS SYSTEM:  Alert & Oriented X3, Good concentration;   CHEST/LUNG: Clear to percussion bilaterally; No rales, rhonchi, wheezing, or rubs  HEART: Regular rate and rhythm; No murmurs, rubs, or gallops  ABDOMEN: Soft, Nontender, Nondistended; Bowel sounds present  EXTREMITIES:  2+ Peripheral Pulses, No clubbing, cyanosis, or edema  LYMPH: No lymphadenopathy noted   RECTAL:  deferred   SKIN: No rashes or lesions    LABS:                        13.1   11.00 )-----------( 115      ( 20 Jul 2019 07:11 )             39.8       CBC:  07-20 @ 07:11  WBC  11.00  HGB 13.1  HCT 39.8 Plate 115  MCV 87.3  07-19 @ 16:37  WBC  12.25  HGB 14.6  HCT 44.4 Plate 142  MCV 87.6           20 Jul 2019 07:11    136    |  104    |  6      ----------------------------<  110    3.4     |  20     |  1.10   19 Jul 2019 16:37    131    |  98     |  10     ----------------------------<  155    3.3     |  24     |  1.54     Ca    7.3        20 Jul 2019 07:11  Ca    8.8        19 Jul 2019 16:37    TPro  6.8    /  Alb  2.8    /  TBili  0.8    /  DBili  x      /  AST  60     /  ALT  43     /  AlkPhos  63     20 Jul 2019 07:11  TPro  8.4    /  Alb  3.4    /  TBili  0.7    /  DBili  x      /  AST  26     /  ALT  43     /  AlkPhos  76     19 Jul 2019 16:37    PT/INR - ( 19 Jul 2019 16:37 )   PT: 15.1 sec;   INR: 1.34 ratio         PTT - ( 19 Jul 2019 16:37 )  PTT:29.1 sec        RADIOLOGY & ADDITIONAL STUDIES: HPI:  33 years old male here c/o fever nausea and non-bloody diarrhea with diffused abd pain since yesterday. Pt denies headache, dizziness, blurred vision, light sensitivities, focal/distal weakness or numbness, neck/back pain, cough, sob, chest pain, vomiting, dysuria, hematuria or irregular bowel movements. Pt also denies recent traveling.   CXR showed RLL infiltrates, Abd CT revealed RLL PNA, he was started on IVF, IV antibiotics. (19 Jul 2019 18:39)  ------------ As Above -----------------------------------  Patient seen earlier today. Patient is a poor historian. Patient had been in his USOH until Wednesday when he developed below rib pain, abdominal pain, back pain, and knee pain. Chills (+). Later that day, he developed diarrhea. Symptoms continued and he came to the ER.  Consult called for diarrhea.  His girlfriend does not have the same symptoms ( ate the same foods ) Sister has a cold. No recent antibiotics. No recent travel. Non bloody.  Prior to Wednesday, the patient denies melena, hematochezia, hematemesis, nausea, vomiting, abdominal pain, constipation, diarrhea, or change in bowel movements No family history of  IBD  In the hospital, he was started on Zithromax, Flagyl PO and Zosyn.   Stool studies pending  ( Note : Patient denies SOB on admission until the very end of the consultation < nasal cannula > )       PAST MEDICAL & SURGICAL HISTORY:  No pertinent family history  No significant past surgical history      MEDICATIONS  (STANDING):  ALBUTerol/ipratropium for Nebulization 3 milliLiter(s) Nebulizer every 6 hours  azithromycin  IVPB 500 milliGRAM(s) IV Intermittent every 24 hours  metroNIDAZOLE    Tablet 500 milliGRAM(s) Oral every 8 hours  nicotine -  14 mG/24Hr(s) Patch 1 patch Transdermal daily  piperacillin/tazobactam IVPB.. 3.375 Gram(s) IV Intermittent every 8 hours  sodium chloride 0.9%. 1000 milliLiter(s) (80 mL/Hr) IV Continuous <Continuous>    MEDICATIONS  (PRN):  acetaminophen   Tablet .. 650 milliGRAM(s) Oral every 6 hours PRN Temp greater or equal to 38C (100.4F), Mild Pain (1 - 3)  guaiFENesin    Syrup 200 milliGRAM(s) Oral every 6 hours PRN Cough      Allergies    No Known Allergies    Intolerances        FAMILY HISTORY:  FH: HTN (hypertension)      REVIEW OF SYSTEMS:    CONSTITUTIONAL: No fever, weight loss, or fatigue  EYES: No eye pain, visual disturbances, or discharge  ENMT:  No difficulty hearing, tinnitus, vertigo; No sinus or throat pain  NECK: No pain or stiffness  BREASTS: No pain, masses, or nipple discharge  RESPIRATORY: No cough, wheezing, chills or hemoptysis;   CARDIOVASCULAR: No chest pain, palpitations, dizziness, or leg swelling  GASTROINTESTINAL: See above  GENITOURINARY: No dysuria, frequency, hematuria, or incontinence  NEUROLOGICAL: No headaches, memory loss, loss of strength, numbness, or tremors  SKIN: No itching, burning, rashes, or lesions   LYMPH NODES: No enlarged glands  ENDOCRINE: No heat or cold intolerance; No hair loss  MUSCULOSKELETAL: No joint pain or swelling; No muscle, back, or extremity pain  PSYCHIATRIC: No depression, anxiety, mood swings, or difficulty sleeping  HEME/LYMPH: No easy bruising, or bleeding gums  ALLERGY AND IMMUNOLOGIC: No hives or eczema          SOCIAL HISTORY:    FAMILY HISTORY:  FH: HTN (hypertension)      Vital Signs Last 24 Hrs  T(C): 39.4 (20 Jul 2019 18:49), Max: 40.2 (20 Jul 2019 13:33)  T(F): 102.9 (20 Jul 2019 18:49), Max: 104.4 (20 Jul 2019 13:33)  HR: 107 (20 Jul 2019 18:49) (104 - 120)  BP: 131/62 (20 Jul 2019 18:49) (118/69 - 147/84)  BP(mean): --  RR: 22 (20 Jul 2019 18:49) (16 - 22)  SpO2: 93% (20 Jul 2019 18:49) (93% - 97%)    PHYSICAL EXAM:    GENERAL: NAD, well-groomed, well-developed  HEAD:  Atraumatic, Normocephalic  EYES: EOMI, PERRLA, conjunctiva and sclera clear  NECK: Supple, No JVD, Normal thyroid  NERVOUS SYSTEM:  Alert & Oriented X3, Good concentration;   CHEST/LUNG: Clear to percussion bilaterally; No rales, rhonchi, wheezing, or rubs  HEART: Regular rate and rhythm; No murmurs, rubs, or gallops  ABDOMEN: Soft, Nontender, Nondistended; Bowel sounds present  EXTREMITIES:  2+ Peripheral Pulses, No clubbing, cyanosis, or edema  LYMPH: No lymphadenopathy noted   RECTAL:  deferred   SKIN: No rashes or lesions    LABS:                        13.1   11.00 )-----------( 115      ( 20 Jul 2019 07:11 )             39.8       CBC:  07-20 @ 07:11  WBC  11.00  HGB 13.1  HCT 39.8 Plate 115  MCV 87.3  07-19 @ 16:37  WBC  12.25  HGB 14.6  HCT 44.4 Plate 142  MCV 87.6           20 Jul 2019 07:11    136    |  104    |  6      ----------------------------<  110    3.4     |  20     |  1.10   19 Jul 2019 16:37    131    |  98     |  10     ----------------------------<  155    3.3     |  24     |  1.54     Ca    7.3        20 Jul 2019 07:11  Ca    8.8        19 Jul 2019 16:37    TPro  6.8    /  Alb  2.8    /  TBili  0.8    /  DBili  x      /  AST  60     /  ALT  43     /  AlkPhos  63     20 Jul 2019 07:11  TPro  8.4    /  Alb  3.4    /  TBili  0.7    /  DBili  x      /  AST  26     /  ALT  43     /  AlkPhos  76     19 Jul 2019 16:37    PT/INR - ( 19 Jul 2019 16:37 )   PT: 15.1 sec;   INR: 1.34 ratio         PTT - ( 19 Jul 2019 16:37 )  PTT:29.1 sec        RADIOLOGY & ADDITIONAL STUDIES:  < from: CT Abdomen and Pelvis w/ Oral Cont and w/ IV Cont (07.19.19 @ 17:54) >    EXAM:  CT ABDOMEN AND PELVIS OC IC                            PROCEDURE DATE:  07/19/2019          INTERPRETATION:  CLINICAL INFORMATION: Fever and abdominal pain, diarrhea    COMPARISON: None.    PROCEDURE:   CT of the Abdomen and Pelvis was performed with intravenous contrast.   Intravenous contrast: 90 ml Omnipaque 350. 10 ml discarded.  Oral contrast: positive contrast was administered.  Sagittal and coronal reformats were performed.    FINDINGS:    LOWER CHEST: Right lower lobe consolidation. Additional scattered basilar   opacities.    LIVER: Low-attenuation, compatible with hepatic steatosis.  BILE DUCTS: Normal caliber.  GALLBLADDER: Within normal limits.  SPLEEN: Within normal limits.  PANCREAS: Within normal limits.  ADRENALS: Within normal limits.  KIDNEYS/URETERS: Subcentimeter hypodense renal lesions, too small to   further characterize. No hydronephrosis.    BLADDER: Within normal limits.  REPRODUCTIVE ORGANS: Prostate within normal limits.    BOWEL: No bowel obstruction. Appendix within normal limits. Colonic   diverticulosis.  PERITONEUM: No ascites.  VESSELS:  Within normal limits.  RETROPERITONEUM: No lymphadenopathy.    ABDOMINAL WALL: Within normal limits.  BONES: Within normal limits.    IMPRESSION:     1. Rightlower lobe pneumonia.  2. No CT evidence of colitis or bowel obstruction.                  NENO DONALD M.D., ATTENDING RADIOLOGIST  This document has been electronically signed. Jul 19 2019  6:16PM                < end of copied text >

## 2019-07-20 NOTE — CONSULT NOTE ADULT - SUBJECTIVE AND OBJECTIVE BOX
33 year old man with no past medical history currently admitted for pneumonia. Patient states that symptoms of shortness of breath, abdominal pain, diarrhea, fever and knee pain started 2 days ago. Upon workup he was found to have right lower lobe pneumonia. He was started on zosyn, flagyl and azithromycin. Lactate improved. Critical care consult was called to evaluate him when today his temp was noted to be 104.4 and his oxygen requirement went up. 33 year old man with no past medical history currently admitted yesterday for pneumonia. Patient states that his symptoms of shortness of breath, abdominal pain, diarrhea, fever and knee pain started 2 days ago. Upon workup he was found to have right lower lobe pneumonia. He was started on zosyn, flagyl and azithromycin. Lactate improved. Critical care consult was called to evaluate him when today his temp was noted to be 104.4 , Heart rate 140's, and his oxygen requirement went up requiring him to be placed on BIPAP.   Upon evaluation, patient stated he was having increase in shortness of breath and now he feels better since the BIPAP was started. Denies chest pain, and has no other complaints at this time.     Vitals at time of evaluation:   HR: 115, BP: 122/80 RR: 24, Sat: 96 % on FiO2 of 50%  BIPAP: IPAP 10 , EPAP 5 , FIO2: increase from 40 - 50 %    Physical exam:   General Tachypneic but not using accessary muscles to breathe, no nasal flaring  Neuro : Alert and oriented X 3, no focal deficits  Lung: clear to ascultation, bilateral air entry, no wheezing heard  Heart: S1, S2, no murmur heard  Abdomen: soft, non tender, non distended, bowel sounds heard  Ext: moves all extremities, no edema present.

## 2019-07-20 NOTE — CHART NOTE - NSCHARTNOTEFT_GEN_A_CORE
Pt was seen and re-evaluated for respiratory distress,  He has no chest pain, but complains that he can't breathe.    O2 demand has increased. HR up to 150 at times.    VS: T: 104.4, BP: stable  O2 saturation drops down in the 80's.  DDimer - ordered, ABG order  I want to get CTA chest, but patient is too unstable to go off the Unit.  He was started on BIPAP,  ICU team was called to evaluate him for poss transfer to ICU.

## 2019-07-21 LAB
ALBUMIN SERPL ELPH-MCNC: 2.5 G/DL — LOW (ref 3.3–5)
ALP SERPL-CCNC: 61 U/L — SIGNIFICANT CHANGE UP (ref 40–120)
ALT FLD-CCNC: 43 U/L — SIGNIFICANT CHANGE UP (ref 12–78)
ANION GAP SERPL CALC-SCNC: 7 MMOL/L — SIGNIFICANT CHANGE UP (ref 5–17)
APPEARANCE UR: ABNORMAL
APTT BLD: 28.4 SEC — LOW (ref 28.5–37)
AST SERPL-CCNC: 102 U/L — HIGH (ref 15–37)
B PERT IGG+IGM PNL SER: CLEAR — SIGNIFICANT CHANGE UP
BASE EXCESS BLDA CALC-SCNC: -0.8 MMOL/L — SIGNIFICANT CHANGE UP (ref -2–2)
BASE EXCESS BLDA CALC-SCNC: -1.9 MMOL/L — SIGNIFICANT CHANGE UP (ref -2–2)
BASE EXCESS BLDA CALC-SCNC: -2.5 MMOL/L — LOW (ref -2–2)
BASE EXCESS BLDA CALC-SCNC: -4.1 MMOL/L — LOW (ref -2–2)
BILIRUB SERPL-MCNC: 0.6 MG/DL — SIGNIFICANT CHANGE UP (ref 0.2–1.2)
BILIRUB UR-MCNC: NEGATIVE — SIGNIFICANT CHANGE UP
BLOOD GAS COMMENTS: SIGNIFICANT CHANGE UP
BLOOD GAS SOURCE: SIGNIFICANT CHANGE UP
BUN SERPL-MCNC: 5 MG/DL — LOW (ref 7–23)
C DIFF BY PCR RESULT: SIGNIFICANT CHANGE UP
C DIFF TOX GENS STL QL NAA+PROBE: SIGNIFICANT CHANGE UP
CALCIUM SERPL-MCNC: 7.9 MG/DL — LOW (ref 8.5–10.1)
CHLORIDE SERPL-SCNC: 104 MMOL/L — SIGNIFICANT CHANGE UP (ref 96–108)
CO2 SERPL-SCNC: 22 MMOL/L — SIGNIFICANT CHANGE UP (ref 22–31)
COLOR SPEC: ABNORMAL
CREAT SERPL-MCNC: 0.95 MG/DL — SIGNIFICANT CHANGE UP (ref 0.5–1.3)
DIFF PNL FLD: ABNORMAL
ERYTHROCYTE [SEDIMENTATION RATE] IN BLOOD: 60 MM/HR — HIGH (ref 0–15)
FIBRINOGEN PPP-MCNC: 1079 MG/DL — HIGH (ref 350–510)
FLU A RESULT: SIGNIFICANT CHANGE UP
FLU A RESULT: SIGNIFICANT CHANGE UP
FLUAV AG NPH QL: SIGNIFICANT CHANGE UP
FLUBV AG NPH QL: SIGNIFICANT CHANGE UP
FLUID INTAKE SUBSTANCE CLASS: SIGNIFICANT CHANGE UP
GLUCOSE BLDC GLUCOMTR-MCNC: 125 MG/DL — HIGH (ref 70–99)
GLUCOSE BLDC GLUCOMTR-MCNC: 132 MG/DL — HIGH (ref 70–99)
GLUCOSE BLDC GLUCOMTR-MCNC: 154 MG/DL — HIGH (ref 70–99)
GLUCOSE BLDC GLUCOMTR-MCNC: 158 MG/DL — HIGH (ref 70–99)
GLUCOSE BLDC GLUCOMTR-MCNC: 164 MG/DL — HIGH (ref 70–99)
GLUCOSE SERPL-MCNC: 121 MG/DL — HIGH (ref 70–99)
GLUCOSE UR QL: NEGATIVE MG/DL — SIGNIFICANT CHANGE UP
HAV IGM SER-ACNC: SIGNIFICANT CHANGE UP
HBV CORE IGM SER-ACNC: SIGNIFICANT CHANGE UP
HBV SURFACE AG SER-ACNC: SIGNIFICANT CHANGE UP
HCO3 BLDA-SCNC: 22 MMOL/L — SIGNIFICANT CHANGE UP (ref 21–29)
HCO3 BLDA-SCNC: 23 MMOL/L — SIGNIFICANT CHANGE UP (ref 21–29)
HCT VFR BLD CALC: 37.3 % — LOW (ref 39–50)
HCV AB S/CO SERPL IA: 0.06 S/CO — SIGNIFICANT CHANGE UP (ref 0–0.99)
HCV AB SERPL-IMP: SIGNIFICANT CHANGE UP
HGB BLD-MCNC: 12.7 G/DL — LOW (ref 13–17)
HIV 1+2 AB+HIV1 P24 AG SERPL QL IA: SIGNIFICANT CHANGE UP
HOROWITZ INDEX BLDA+IHG-RTO: 1 — SIGNIFICANT CHANGE UP
HOROWITZ INDEX BLDA+IHG-RTO: 100 — SIGNIFICANT CHANGE UP
HOROWITZ INDEX BLDA+IHG-RTO: 100 — SIGNIFICANT CHANGE UP
HOROWITZ INDEX BLDA+IHG-RTO: 90 — SIGNIFICANT CHANGE UP
INR BLD: 1.32 RATIO — HIGH (ref 0.88–1.16)
KETONES UR-MCNC: ABNORMAL
LACTATE SERPL-SCNC: 1.4 MMOL/L — SIGNIFICANT CHANGE UP (ref 0.7–2)
LDH SERPL L TO P-CCNC: 1203 U/L — HIGH (ref 50–242)
LEUKOCYTE ESTERASE UR-ACNC: ABNORMAL
MAGNESIUM SERPL-MCNC: 2 MG/DL — SIGNIFICANT CHANGE UP (ref 1.6–2.6)
MCHC RBC-ENTMCNC: 28.9 PG — SIGNIFICANT CHANGE UP (ref 27–34)
MCHC RBC-ENTMCNC: 34 GM/DL — SIGNIFICANT CHANGE UP (ref 32–36)
MCV RBC AUTO: 85 FL — SIGNIFICANT CHANGE UP (ref 80–100)
NITRITE UR-MCNC: NEGATIVE — SIGNIFICANT CHANGE UP
NRBC # BLD: 0 /100 WBCS — SIGNIFICANT CHANGE UP (ref 0–0)
NT-PROBNP SERPL-SCNC: 605 PG/ML — HIGH (ref 0–125)
PCO2 BLDA: 31 MMHG — LOW (ref 32–46)
PCO2 BLDA: 39 MMHG — SIGNIFICANT CHANGE UP (ref 32–46)
PCO2 BLDA: 41 MMHG — SIGNIFICANT CHANGE UP (ref 32–46)
PCO2 BLDA: 49 MMHG — HIGH (ref 32–46)
PH BLD: 7.29 — LOW (ref 7.35–7.45)
PH BLD: 7.36 — SIGNIFICANT CHANGE UP (ref 7.35–7.45)
PH BLD: 7.37 — SIGNIFICANT CHANGE UP (ref 7.35–7.45)
PH BLD: 7.46 — HIGH (ref 7.35–7.45)
PH UR: 5 — SIGNIFICANT CHANGE UP (ref 5–8)
PHOSPHATE SERPL-MCNC: 1.3 MG/DL — LOW (ref 2.5–4.5)
PLATELET # BLD AUTO: 102 K/UL — LOW (ref 150–400)
PO2 BLDA: 102 MMHG — SIGNIFICANT CHANGE UP (ref 74–108)
PO2 BLDA: 62 MMHG — LOW (ref 74–108)
PO2 BLDA: 82 MMHG — SIGNIFICANT CHANGE UP (ref 74–108)
PO2 BLDA: 86 MMHG — SIGNIFICANT CHANGE UP (ref 74–108)
POTASSIUM SERPL-MCNC: 3 MMOL/L — LOW (ref 3.5–5.3)
POTASSIUM SERPL-SCNC: 3 MMOL/L — LOW (ref 3.5–5.3)
PROCALCITONIN SERPL-MCNC: 6 NG/ML — HIGH (ref 0.02–0.1)
PROT SERPL-MCNC: 6.9 GM/DL — SIGNIFICANT CHANGE UP (ref 6–8.3)
PROT UR-MCNC: 100 MG/DL
PROTHROM AB SERPL-ACNC: 14.9 SEC — HIGH (ref 10–12.9)
RAPID RVP RESULT: SIGNIFICANT CHANGE UP
RBC # BLD: 4.39 M/UL — SIGNIFICANT CHANGE UP (ref 4.2–5.8)
RBC # FLD: 13.5 % — SIGNIFICANT CHANGE UP (ref 10.3–14.5)
RCV VOL RI: 2000 /UL — HIGH (ref 0–5)
RHEUMATOID FACT SERPL-ACNC: 16 IU/ML — HIGH (ref 0–13)
RSV RESULT: SIGNIFICANT CHANGE UP
RSV RNA RESP QL NAA+PROBE: SIGNIFICANT CHANGE UP
SAO2 % BLDA: 92 % — SIGNIFICANT CHANGE UP (ref 92–96)
SAO2 % BLDA: 97 % — HIGH (ref 92–96)
SODIUM SERPL-SCNC: 133 MMOL/L — LOW (ref 135–145)
SP GR SPEC: 1.01 — SIGNIFICANT CHANGE UP (ref 1.01–1.02)
TOTAL NUCLEATED CELL COUNT, BODY FLUID: 308 /UL — HIGH (ref 0–5)
TUBE TYPE: SIGNIFICANT CHANGE UP
UROBILINOGEN FLD QL: NEGATIVE MG/DL — SIGNIFICANT CHANGE UP
WBC # BLD: 8.93 K/UL — SIGNIFICANT CHANGE UP (ref 3.8–10.5)
WBC # FLD AUTO: 8.93 K/UL — SIGNIFICANT CHANGE UP (ref 3.8–10.5)

## 2019-07-21 PROCEDURE — 88305 TISSUE EXAM BY PATHOLOGIST: CPT | Mod: 26

## 2019-07-21 PROCEDURE — 88112 CYTOPATH CELL ENHANCE TECH: CPT | Mod: 26

## 2019-07-21 PROCEDURE — 71275 CT ANGIOGRAPHY CHEST: CPT | Mod: 26

## 2019-07-21 PROCEDURE — 88312 SPECIAL STAINS GROUP 1: CPT | Mod: 26

## 2019-07-21 PROCEDURE — 31500 INSERT EMERGENCY AIRWAY: CPT

## 2019-07-21 PROCEDURE — 93010 ELECTROCARDIOGRAM REPORT: CPT

## 2019-07-21 PROCEDURE — 71045 X-RAY EXAM CHEST 1 VIEW: CPT | Mod: 26,76

## 2019-07-21 RX ORDER — ASCORBIC ACID 60 MG
1500 TABLET,CHEWABLE ORAL
Refills: 0 | Status: DISCONTINUED | OUTPATIENT
Start: 2019-07-21 | End: 2019-07-21

## 2019-07-21 RX ORDER — CISATRACURIUM BESYLATE 2 MG/ML
10 INJECTION INTRAVENOUS ONCE
Refills: 0 | Status: DISCONTINUED | OUTPATIENT
Start: 2019-07-21 | End: 2019-07-21

## 2019-07-21 RX ORDER — NOREPINEPHRINE BITARTRATE/D5W 8 MG/250ML
0.05 PLASTIC BAG, INJECTION (ML) INTRAVENOUS
Qty: 8 | Refills: 0 | Status: DISCONTINUED | OUTPATIENT
Start: 2019-07-21 | End: 2019-07-22

## 2019-07-21 RX ORDER — MIDAZOLAM HYDROCHLORIDE 1 MG/ML
4 INJECTION, SOLUTION INTRAMUSCULAR; INTRAVENOUS ONCE
Refills: 0 | Status: DISCONTINUED | OUTPATIENT
Start: 2019-07-21 | End: 2019-07-21

## 2019-07-21 RX ORDER — FENTANYL CITRATE 50 UG/ML
0.5 INJECTION INTRAVENOUS
Qty: 2500 | Refills: 0 | Status: DISCONTINUED | OUTPATIENT
Start: 2019-07-21 | End: 2019-07-25

## 2019-07-21 RX ORDER — HYDROCORTISONE 20 MG
50 TABLET ORAL EVERY 6 HOURS
Refills: 0 | Status: DISCONTINUED | OUTPATIENT
Start: 2019-07-21 | End: 2019-07-21

## 2019-07-21 RX ORDER — DEXTROSE 50 % IN WATER 50 %
12.5 SYRINGE (ML) INTRAVENOUS ONCE
Refills: 0 | Status: DISCONTINUED | OUTPATIENT
Start: 2019-07-21 | End: 2019-07-29

## 2019-07-21 RX ORDER — DEXMEDETOMIDINE HYDROCHLORIDE IN 0.9% SODIUM CHLORIDE 4 UG/ML
0.2 INJECTION INTRAVENOUS
Qty: 200 | Refills: 0 | Status: DISCONTINUED | OUTPATIENT
Start: 2019-07-21 | End: 2019-07-25

## 2019-07-21 RX ORDER — DEXTROSE 50 % IN WATER 50 %
15 SYRINGE (ML) INTRAVENOUS ONCE
Refills: 0 | Status: DISCONTINUED | OUTPATIENT
Start: 2019-07-21 | End: 2019-07-29

## 2019-07-21 RX ORDER — PANTOPRAZOLE SODIUM 20 MG/1
40 TABLET, DELAYED RELEASE ORAL DAILY
Refills: 0 | Status: DISCONTINUED | OUTPATIENT
Start: 2019-07-21 | End: 2019-07-23

## 2019-07-21 RX ORDER — DEXTROSE 50 % IN WATER 50 %
25 SYRINGE (ML) INTRAVENOUS ONCE
Refills: 0 | Status: DISCONTINUED | OUTPATIENT
Start: 2019-07-21 | End: 2019-07-29

## 2019-07-21 RX ORDER — THIAMINE MONONITRATE (VIT B1) 100 MG
200 TABLET ORAL
Refills: 0 | Status: DISCONTINUED | OUTPATIENT
Start: 2019-07-21 | End: 2019-07-21

## 2019-07-21 RX ORDER — PROPOFOL 10 MG/ML
50 INJECTION, EMULSION INTRAVENOUS ONCE
Refills: 0 | Status: COMPLETED | OUTPATIENT
Start: 2019-07-21 | End: 2019-07-21

## 2019-07-21 RX ORDER — PROPOFOL 10 MG/ML
10 INJECTION, EMULSION INTRAVENOUS
Qty: 1000 | Refills: 0 | Status: DISCONTINUED | OUTPATIENT
Start: 2019-07-21 | End: 2019-07-21

## 2019-07-21 RX ORDER — FENTANYL CITRATE 50 UG/ML
100 INJECTION INTRAVENOUS ONCE
Refills: 0 | Status: DISCONTINUED | OUTPATIENT
Start: 2019-07-21 | End: 2019-07-21

## 2019-07-21 RX ORDER — ROCURONIUM BROMIDE 10 MG/ML
50 VIAL (ML) INTRAVENOUS ONCE
Refills: 0 | Status: COMPLETED | OUTPATIENT
Start: 2019-07-21 | End: 2019-07-21

## 2019-07-21 RX ORDER — HYDROCORTISONE 20 MG
100 TABLET ORAL EVERY 6 HOURS
Refills: 0 | Status: DISCONTINUED | OUTPATIENT
Start: 2019-07-21 | End: 2019-07-25

## 2019-07-21 RX ORDER — PROPOFOL 10 MG/ML
350 INJECTION, EMULSION INTRAVENOUS ONCE
Refills: 0 | Status: COMPLETED | OUTPATIENT
Start: 2019-07-21 | End: 2019-07-21

## 2019-07-21 RX ORDER — MIDAZOLAM HYDROCHLORIDE 1 MG/ML
2 INJECTION, SOLUTION INTRAMUSCULAR; INTRAVENOUS ONCE
Refills: 0 | Status: DISCONTINUED | OUTPATIENT
Start: 2019-07-21 | End: 2019-07-21

## 2019-07-21 RX ORDER — VANCOMYCIN HCL 1 G
1250 VIAL (EA) INTRAVENOUS ONCE
Refills: 0 | Status: COMPLETED | OUTPATIENT
Start: 2019-07-21 | End: 2019-07-21

## 2019-07-21 RX ORDER — SODIUM CHLORIDE 9 MG/ML
1000 INJECTION, SOLUTION INTRAVENOUS
Refills: 0 | Status: DISCONTINUED | OUTPATIENT
Start: 2019-07-21 | End: 2019-07-22

## 2019-07-21 RX ORDER — INSULIN LISPRO 100/ML
VIAL (ML) SUBCUTANEOUS EVERY 6 HOURS
Refills: 0 | Status: DISCONTINUED | OUTPATIENT
Start: 2019-07-21 | End: 2019-07-29

## 2019-07-21 RX ORDER — MEROPENEM 1 G/30ML
500 INJECTION INTRAVENOUS EVERY 8 HOURS
Refills: 0 | Status: DISCONTINUED | OUTPATIENT
Start: 2019-07-21 | End: 2019-07-23

## 2019-07-21 RX ORDER — SODIUM CHLORIDE 9 MG/ML
1000 INJECTION, SOLUTION INTRAVENOUS
Refills: 0 | Status: DISCONTINUED | OUTPATIENT
Start: 2019-07-21 | End: 2019-07-29

## 2019-07-21 RX ORDER — CHLORHEXIDINE GLUCONATE 213 G/1000ML
1 SOLUTION TOPICAL
Refills: 0 | Status: DISCONTINUED | OUTPATIENT
Start: 2019-07-21 | End: 2019-07-26

## 2019-07-21 RX ORDER — ASCORBIC ACID 60 MG
1500 TABLET,CHEWABLE ORAL
Refills: 0 | Status: COMPLETED | OUTPATIENT
Start: 2019-07-21 | End: 2019-07-24

## 2019-07-21 RX ORDER — THIAMINE MONONITRATE (VIT B1) 100 MG
200 TABLET ORAL
Refills: 0 | Status: COMPLETED | OUTPATIENT
Start: 2019-07-21 | End: 2019-07-24

## 2019-07-21 RX ORDER — SODIUM CHLORIDE 9 MG/ML
1000 INJECTION, SOLUTION INTRAVENOUS
Refills: 0 | Status: DISCONTINUED | OUTPATIENT
Start: 2019-07-21 | End: 2019-07-21

## 2019-07-21 RX ORDER — ENOXAPARIN SODIUM 100 MG/ML
40 INJECTION SUBCUTANEOUS DAILY
Refills: 0 | Status: DISCONTINUED | OUTPATIENT
Start: 2019-07-21 | End: 2019-07-29

## 2019-07-21 RX ORDER — GLUCAGON INJECTION, SOLUTION 0.5 MG/.1ML
1 INJECTION, SOLUTION SUBCUTANEOUS ONCE
Refills: 0 | Status: DISCONTINUED | OUTPATIENT
Start: 2019-07-21 | End: 2019-07-29

## 2019-07-21 RX ORDER — POTASSIUM CHLORIDE 20 MEQ
20 PACKET (EA) ORAL ONCE
Refills: 0 | Status: COMPLETED | OUTPATIENT
Start: 2019-07-21 | End: 2019-07-21

## 2019-07-21 RX ORDER — VANCOMYCIN HCL 1 G
1000 VIAL (EA) INTRAVENOUS EVERY 8 HOURS
Refills: 0 | Status: DISCONTINUED | OUTPATIENT
Start: 2019-07-21 | End: 2019-07-22

## 2019-07-21 RX ORDER — POTASSIUM PHOSPHATE, MONOBASIC POTASSIUM PHOSPHATE, DIBASIC 236; 224 MG/ML; MG/ML
30 INJECTION, SOLUTION INTRAVENOUS ONCE
Refills: 0 | Status: COMPLETED | OUTPATIENT
Start: 2019-07-21 | End: 2019-07-21

## 2019-07-21 RX ORDER — PROPOFOL 10 MG/ML
50 INJECTION, EMULSION INTRAVENOUS
Qty: 1000 | Refills: 0 | Status: DISCONTINUED | OUTPATIENT
Start: 2019-07-21 | End: 2019-07-24

## 2019-07-21 RX ORDER — CISATRACURIUM BESYLATE 2 MG/ML
3 INJECTION INTRAVENOUS
Qty: 200 | Refills: 0 | Status: DISCONTINUED | OUTPATIENT
Start: 2019-07-21 | End: 2019-07-24

## 2019-07-21 RX ORDER — IBUPROFEN 200 MG
400 TABLET ORAL EVERY 6 HOURS
Refills: 0 | Status: DISCONTINUED | OUTPATIENT
Start: 2019-07-21 | End: 2019-07-22

## 2019-07-21 RX ORDER — IBUPROFEN 200 MG
400 TABLET ORAL ONCE
Refills: 0 | Status: COMPLETED | OUTPATIENT
Start: 2019-07-21 | End: 2019-07-21

## 2019-07-21 RX ORDER — CHLORHEXIDINE GLUCONATE 213 G/1000ML
15 SOLUTION TOPICAL
Refills: 0 | Status: DISCONTINUED | OUTPATIENT
Start: 2019-07-21 | End: 2019-07-25

## 2019-07-21 RX ORDER — ACETAMINOPHEN 500 MG
1000 TABLET ORAL ONCE
Refills: 0 | Status: COMPLETED | OUTPATIENT
Start: 2019-07-21 | End: 2019-07-21

## 2019-07-21 RX ADMIN — DEXMEDETOMIDINE HYDROCHLORIDE IN 0.9% SODIUM CHLORIDE 5.24 MICROGRAM(S)/KG/HR: 4 INJECTION INTRAVENOUS at 19:54

## 2019-07-21 RX ADMIN — FENTANYL CITRATE 100 MICROGRAM(S): 50 INJECTION INTRAVENOUS at 17:27

## 2019-07-21 RX ADMIN — MIDAZOLAM HYDROCHLORIDE 2 MILLIGRAM(S): 1 INJECTION, SOLUTION INTRAMUSCULAR; INTRAVENOUS at 16:05

## 2019-07-21 RX ADMIN — Medication 100 MILLIGRAM(S): at 23:27

## 2019-07-21 RX ADMIN — Medication 250 MILLIGRAM(S): at 21:14

## 2019-07-21 RX ADMIN — Medication 153 MILLIGRAM(S): at 11:01

## 2019-07-21 RX ADMIN — PROPOFOL 350 MILLIGRAM(S): 10 INJECTION, EMULSION INTRAVENOUS at 16:05

## 2019-07-21 RX ADMIN — Medication 166.67 MILLIGRAM(S): at 06:11

## 2019-07-21 RX ADMIN — Medication 50 MILLIGRAM(S): at 11:23

## 2019-07-21 RX ADMIN — Medication 400 MILLIGRAM(S): at 04:56

## 2019-07-21 RX ADMIN — Medication 50 MILLIGRAM(S): at 17:04

## 2019-07-21 RX ADMIN — Medication 50 MILLIGRAM(S): at 17:13

## 2019-07-21 RX ADMIN — MEROPENEM 100 MILLIGRAM(S): 1 INJECTION INTRAVENOUS at 21:14

## 2019-07-21 RX ADMIN — DEXMEDETOMIDINE HYDROCHLORIDE IN 0.9% SODIUM CHLORIDE 5.24 MICROGRAM(S)/KG/HR: 4 INJECTION INTRAVENOUS at 17:36

## 2019-07-21 RX ADMIN — POTASSIUM PHOSPHATE, MONOBASIC POTASSIUM PHOSPHATE, DIBASIC 83.33 MILLIMOLE(S): 236; 224 INJECTION, SOLUTION INTRAVENOUS at 08:48

## 2019-07-21 RX ADMIN — FENTANYL CITRATE 5.24 MICROGRAM(S)/KG/HR: 50 INJECTION INTRAVENOUS at 17:55

## 2019-07-21 RX ADMIN — Medication 1 PATCH: at 11:25

## 2019-07-21 RX ADMIN — ENOXAPARIN SODIUM 40 MILLIGRAM(S): 100 INJECTION SUBCUTANEOUS at 11:18

## 2019-07-21 RX ADMIN — MEROPENEM 100 MILLIGRAM(S): 1 INJECTION INTRAVENOUS at 06:48

## 2019-07-21 RX ADMIN — FENTANYL CITRATE 100 MICROGRAM(S): 50 INJECTION INTRAVENOUS at 17:05

## 2019-07-21 RX ADMIN — Medication 650 MILLIGRAM(S): at 03:43

## 2019-07-21 RX ADMIN — Medication 650 MILLIGRAM(S): at 03:02

## 2019-07-21 RX ADMIN — FENTANYL CITRATE 100 MICROGRAM(S): 50 INJECTION INTRAVENOUS at 17:35

## 2019-07-21 RX ADMIN — CISATRACURIUM BESYLATE 18.85 MICROGRAM(S)/KG/MIN: 2 INJECTION INTRAVENOUS at 17:46

## 2019-07-21 RX ADMIN — Medication 1 PATCH: at 19:55

## 2019-07-21 RX ADMIN — PROPOFOL 31.41 MICROGRAM(S)/KG/MIN: 10 INJECTION, EMULSION INTRAVENOUS at 23:28

## 2019-07-21 RX ADMIN — Medication 250 MILLIGRAM(S): at 14:16

## 2019-07-21 RX ADMIN — Medication 50 MILLIGRAM(S): at 06:48

## 2019-07-21 RX ADMIN — Medication 3 MILLILITER(S): at 05:23

## 2019-07-21 RX ADMIN — SODIUM CHLORIDE 100 MILLILITER(S): 9 INJECTION, SOLUTION INTRAVENOUS at 21:13

## 2019-07-21 RX ADMIN — Medication 3 MILLILITER(S): at 17:20

## 2019-07-21 RX ADMIN — Medication 1: at 23:28

## 2019-07-21 RX ADMIN — PROPOFOL 6.28 MICROGRAM(S)/KG/MIN: 10 INJECTION, EMULSION INTRAVENOUS at 18:13

## 2019-07-21 RX ADMIN — Medication 400 MILLIGRAM(S): at 03:49

## 2019-07-21 RX ADMIN — FENTANYL CITRATE 100 MICROGRAM(S): 50 INJECTION INTRAVENOUS at 16:50

## 2019-07-21 RX ADMIN — Medication 400 MILLIGRAM(S): at 11:01

## 2019-07-21 RX ADMIN — Medication 3 MILLILITER(S): at 00:07

## 2019-07-21 RX ADMIN — PROPOFOL 6.28 MICROGRAM(S)/KG/MIN: 10 INJECTION, EMULSION INTRAVENOUS at 16:05

## 2019-07-21 RX ADMIN — Medication 102 MILLIGRAM(S): at 07:06

## 2019-07-21 RX ADMIN — SODIUM CHLORIDE 100 MILLILITER(S): 9 INJECTION, SOLUTION INTRAVENOUS at 11:25

## 2019-07-21 RX ADMIN — CHLORHEXIDINE GLUCONATE 15 MILLILITER(S): 213 SOLUTION TOPICAL at 17:56

## 2019-07-21 RX ADMIN — Medication 15 MILLILITER(S): at 06:49

## 2019-07-21 RX ADMIN — Medication 102 MILLIGRAM(S): at 17:58

## 2019-07-21 RX ADMIN — Medication 1 PATCH: at 07:30

## 2019-07-21 RX ADMIN — Medication 20 MILLIEQUIVALENT(S): at 06:57

## 2019-07-21 RX ADMIN — Medication 3 MILLILITER(S): at 11:11

## 2019-07-21 RX ADMIN — Medication 9.82 MICROGRAM(S)/KG/MIN: at 20:33

## 2019-07-21 RX ADMIN — Medication 1 PATCH: at 11:41

## 2019-07-21 RX ADMIN — Medication 1000 MILLIGRAM(S): at 11:46

## 2019-07-21 RX ADMIN — Medication 153 MILLIGRAM(S): at 17:58

## 2019-07-21 RX ADMIN — Medication 650 MILLIGRAM(S): at 21:02

## 2019-07-21 RX ADMIN — MEROPENEM 100 MILLIGRAM(S): 1 INJECTION INTRAVENOUS at 14:15

## 2019-07-21 RX ADMIN — Medication 1: at 18:19

## 2019-07-21 RX ADMIN — PROPOFOL 50 MILLIGRAM(S): 10 INJECTION, EMULSION INTRAVENOUS at 17:42

## 2019-07-21 RX ADMIN — Medication 153 MILLIGRAM(S): at 07:06

## 2019-07-21 RX ADMIN — CHLORHEXIDINE GLUCONATE 1 APPLICATION(S): 213 SOLUTION TOPICAL at 06:49

## 2019-07-21 RX ADMIN — PROPOFOL 31.41 MICROGRAM(S)/KG/MIN: 10 INJECTION, EMULSION INTRAVENOUS at 21:14

## 2019-07-21 RX ADMIN — Medication 153 MILLIGRAM(S): at 23:27

## 2019-07-21 RX ADMIN — PANTOPRAZOLE SODIUM 40 MILLIGRAM(S): 20 TABLET, DELAYED RELEASE ORAL at 11:18

## 2019-07-21 RX ADMIN — Medication 650 MILLIGRAM(S): at 19:54

## 2019-07-21 RX ADMIN — Medication 50 MILLIGRAM(S): at 17:56

## 2019-07-21 RX ADMIN — PROPOFOL 6.28 MICROGRAM(S)/KG/MIN: 10 INJECTION, EMULSION INTRAVENOUS at 17:39

## 2019-07-21 RX ADMIN — MIDAZOLAM HYDROCHLORIDE 4 MILLIGRAM(S): 1 INJECTION, SOLUTION INTRAMUSCULAR; INTRAVENOUS at 16:10

## 2019-07-21 NOTE — PROGRESS NOTE ADULT - ASSESSMENT
34yo male  with no PMH, active cigarette smoker, admits to snorting cocaine 1 month ago recreationally, and uses marijuana with recent sick contact at home with sister presents for arthralgia, myalgias, diarrhea, SOB, cough, hemoptysis, acute hypoxic respiratory failure due to PNA. Now in ARDS worsening infiltrative pattern bilaterally on imaging and worsening hypoxia and work of breathing requiring intubation, hypotension likely sedative related. s/p bedside diagnostic bronchoscopy.    1. NEURO  - required heavy sedation and paralytics to achieve vent synchrony    2. PULM  - intubated on mechanical ventilation  - ARDS net protocol  - on PEEP 15 and FIO2: 100%  - repeat ABG on vent, if Pao2 trends downward will place pt in prone positioning  - ECMO if prone positioning not effective  - follow up bronchoscopy results: BAL sent for cultures, fungal stains  - check respiratory viral panel  - BAL not consistent with diffuse alveolar hemorrhage     3. CV  - hypotension likely in setting of sedative use  - pt was not hypotensive pre intubation  - on levophed for BP support  - maintain MAP >65  - wean off pressors as BP tolerates    4. ID  - sepsis secondary to PNA  - follow up sputum cx, BAL cx  - follow up viral panel  - check urine legionella  - maintain pt on broad spectrum antibiotics for now and Levaquin for atypical coverage     5. GEN  - prior to intubation, pt decided to make father and sister designated HCP  - paperwork filled out and placed in chart    Critical Care Time not including procedures: 120 min 32yo male  with no PMH, active cigarette smoker, admits to snorting cocaine 1 month ago recreationally, and uses marijuana with recent sick contact at home with sister presents for arthralgia, myalgias, diarrhea, SOB, cough, hemoptysis, acute hypoxic respiratory failure due to PNA. Now in ARDS worsening infiltrative pattern bilaterally on imaging and worsening hypoxia and work of breathing requiring intubation, hypotension likely sedative related. s/p bedside diagnostic bronchoscopy.    1. NEURO  - required heavy sedation and paralytics to achieve vent synchrony    2. PULM  - intubated on mechanical ventilation  - ARDS net protocol  - on PEEP 15 and FIO2: 100%  - repeat ABG on vent, if Pao2 trends downward will place pt in prone positioning  - ECMO if prone positioning not effective  - follow up bronchoscopy results: BAL sent for cultures, fungal stains  - check respiratory viral panel  - BAL not consistent with diffuse alveolar hemorrhage   - infiltrative patterns on CXR likely infectious, however will also do a rheumatologic work up: check HAMIDA, ds-DNA, C3, C4, antiGBM, C-ANCA, P-ANCA    3. CV  - hypotension likely in setting of sedative use  - pt was not hypotensive pre intubation  - on levophed for BP support  - maintain MAP >65  - wean off pressors as BP tolerates    4. ID  - sepsis secondary to PNA  - follow up sputum cx, BAL cx  - follow up viral panel  - check urine legionella  - maintain pt on broad spectrum antibiotics for now and Levaquin for atypical coverage     5. GEN  - prior to intubation, pt decided to make father and sister designated HCP  - paperwork filled out and placed in chart  - family at bedside updated on condition, all questions and concerns addressed for family    Critical Care Time not including procedures: 120 min

## 2019-07-21 NOTE — PROVIDER CONTACT NOTE (EICU) - RECOMMENDATIONS
start on merem , vanco and levaquin   send LDH   start on marik protocol   ID consult   may need intubation
continue zosyn and zithromax   add Vancomycin   CTPA  continue BIPAP  urine legionella, mycoplasma and strep antigen

## 2019-07-21 NOTE — CHART NOTE - NSCHARTNOTEFT_GEN_A_CORE
Chest CT reviewed, (my read) with significant bilateral consolidations, air bronchograms, dramatically increased since last imaging. Discussed with Dr. Abarca, will discontinue IVF, change antibiotics to Matheus/Levaquin/Vanc, send LDH, start Marik protocol. Pt denies exposure to new chemicals or inhalation of drugs, hiking, recent travel.

## 2019-07-21 NOTE — PROGRESS NOTE ADULT - SUBJECTIVE AND OBJECTIVE BOX
HPI:  33 years old male here c/o fever nausea and non-bloody diarrhea with diffused abd pain since yesterday. Pt denies headache, dizziness, blurred vision, light sensitivities, focal/distal weakness or numbness, neck/back pain, cough, sob, chest pain, vomiting, dysuria, hematuria or irregular bowel movements. Pt also denies recent traveling.   CXR showed RLL infiltrates, Abd CT revealed RLL PNA, he was started on IVF, IV antibiotics. (19 Jul 2019 18:39)      24 hr events:      ## ROS:  [ ] unable to obtain  CONSTITUTIONAL: No fever, weight loss, or fatigue  EYES: No eye pain, visual disturbances, or discharge  ENMT:  No difficulty hearing, tinnitus, vertigo; No sinus or throat pain  NECK: No pain or stiffness  RESPIRATORY: No cough, wheezing, chills or hemoptysis; No shortness of breath  CARDIOVASCULAR: No chest pain, palpitations, dizziness, or leg swelling  GASTROINTESTINAL: No abdominal or epigastric pain. No nausea, vomiting, or hematemesis; No diarrhea or constipation. No melena or hematochezia.  GENITOURINARY: No dysuria, frequency, hematuria, or incontinence  NEUROLOGICAL: No headaches, memory loss, loss of strength, numbness, or tremors  SKIN: No itching, burning, rashes, or lesions   LYMPH NODES: No enlarged glands  ENDOCRINE: No heat or cold intolerance; No hair loss  MUSCULOSKELETAL: No joint pain or swelling; No muscle, back, or extremity pain  PSYCHIATRIC: No depression, anxiety, mood swings, or difficulty sleeping  HEME/LYMPH: No easy bruising, or bleeding gums  ALLERGY AND IMMUNOLOGIC: No hives or eczema    ## Labs:  CBC:                        12.7   8.93  )-----------( 102      ( 21 Jul 2019 05:49 )             37.3     Chem:  07-21    133<L>  |  104  |  5<L>  ----------------------------<  121<H>  3.0<L>   |  22  |  0.95    Ca    7.9<L>      21 Jul 2019 05:49  Phos  1.3     07-21  Mg     2.0     07-21    TPro  6.9  /  Alb  2.5<L>  /  TBili  0.6  /  DBili  x   /  AST  102<H>  /  ALT  43  /  AlkPhos  61  07-21    Coags:  PT/INR - ( 21 Jul 2019 05:49 )   PT: 14.9 sec;   INR: 1.32 ratio         PTT - ( 21 Jul 2019 05:49 )  PTT:28.4 sec        ## Imaging:    ## Medications:  levoFLOXacin IVPB 750 milliGRAM(s) IV Intermittent every 24 hours  meropenem  IVPB 500 milliGRAM(s) IV Intermittent every 8 hours  vancomycin  IVPB 1000 milliGRAM(s) IV Intermittent every 8 hours    norepinephrine Infusion 0.05 MICROgram(s)/kG/Min IV Continuous <Continuous>    ALBUTerol/ipratropium for Nebulization 3 milliLiter(s) Nebulizer every 6 hours  guaiFENesin    Syrup 200 milliGRAM(s) Oral every 6 hours PRN    dextrose 40% Gel 15 Gram(s) Oral once PRN  dextrose 50% Injectable 12.5 Gram(s) IV Push once  dextrose 50% Injectable 25 Gram(s) IV Push once  dextrose 50% Injectable 25 Gram(s) IV Push once  glucagon  Injectable 1 milliGRAM(s) IntraMuscular once PRN  hydrocortisone sodium succinate Injectable 50 milliGRAM(s) IV Push every 6 hours  insulin lispro (HumaLOG) corrective regimen sliding scale   SubCutaneous every 6 hours    enoxaparin Injectable 40 milliGRAM(s) SubCutaneous daily    bismuth subsalicylate Liquid 15 milliLiter(s) Oral three times a day  pantoprazole  Injectable 40 milliGRAM(s) IV Push daily    acetaminophen   Tablet .. 650 milliGRAM(s) Oral every 6 hours PRN  cisatracurium Infusion 3 MICROgram(s)/kG/Min IV Continuous <Continuous>  dexmedetomidine Infusion 0.2 MICROgram(s)/kG/Hr IV Continuous <Continuous>  fentaNYL   Infusion. 0.5 MICROgram(s)/kG/Hr IV Continuous <Continuous>  ibuprofen  Tablet. 400 milliGRAM(s) Oral every 6 hours PRN  propofol Infusion 10 MICROgram(s)/kG/Min IV Continuous <Continuous>  propofol Infusion 50 MICROgram(s)/kG/Min IV Continuous <Continuous>      ## Vitals:  T(C): 38.2 (07-21-19 @ 19:30), Max: 40 (07-21-19 @ 03:16)  HR: 97 (07-21-19 @ 20:15) (95 - 170)  BP: 106/67 (07-21-19 @ 20:15) (103/65 - 156/89)  BP(mean): 78 (07-21-19 @ 20:15) (71 - 119)  RR: 26 (07-21-19 @ 20:15) (16 - 45)  SpO2: 96% (07-21-19 @ 20:15) (87% - 100%)  Wt(kg): --  Vent: Mode: AC/ CMV (Assist Control/ Continuous Mandatory Ventilation), RR (machine): 22, RR (patient): 22, TV (machine): 480, FiO2: 100, PEEP: 15, PIP: 35  ABG: ABG - ( 21 Jul 2019 17:30 )  pH, Arterial: x     pH, Blood: 7.29  /  pCO2: 49    /  pO2: 102   / HCO3: 22    / Base Excess: -4.1  /  SaO2: 97                    07-20 @ 07:01  -  07-21 @ 07:00  --------------------------------------------------------  IN: 2180 mL / OUT: 500 mL / NET: 1680 mL    07-21 @ 07:01  -  07-21 @ 20:21  --------------------------------------------------------  IN: 3089.4 mL / OUT: 400 mL / NET: 2689.4 mL          ## P/E:  Gen: lying comfortably in bed in no apparent distress  HEENT: PERRL, EOMI  Resp: CTA B/L no c/r/w  CVS: S1S2 no m/r/g  Abd: soft NT/ND +BS  Ext: no c/c/e  Neuro: A&Ox3    CENTRAL LINE: [ ] YES [ ] NO  LOCATION:   DATE INSERTED:  REMOVE: [ ] YES [ ] NO      CRAMER: [ ] YES [ ] NO    DATE INSERTED:  REMOVE:  [ ] YES [ ] NO      A-LINE:  [ ] YES [ ] NO  LOCATION:   DATE INSERTED:  REMOVE:  [ ] YES [ ] NO  EXPLAIN:    GLOBAL ISSUE/BEST PRACTICE:  Analgesia:  Sedation:  HOB elevation: yes  Stress ulcer prophylaxis:  VTE prophylaxis:  Oral Care:  Glycemic control:  Nutrition:    CODE STATUS: [ ] full code  [ ] DNR  [ ] DNI  [ ] MOLST  Goals of care discussion: [ ] yes HPI:  33M with no PMH, active cigarette smoker, admits to snorting cocaine 1 month ago recreationally, and uses marijuana presents for 4-5 days of bilateral knee arthralgias and myalgias now with 1 day hx of SOB, cough, and hemoptysis reported by pt. Pt also reports loose stool. Found to be febrile. No recent travel. Is a . Sister at home with URI symptoms. CXR with R infiltrate. Hypoxic with tachypnea placed on biPAP by medical team. Treated for sepsis secondary to PNA with hypoxic respiratory failure. Course with high fevers 104. RRT called overnight for persistent fever of 104. Chest imaging with worsening bilateral infiltrative patterns. Transferred to ICU overnight.	      24 hr events:  remains tachypneic on BiPAP  FIO2 titrated up from 50% to 85% to 100%  increased work of breathing throughout the day  discussed with pt and family and all in agreement for intubation  became hypotensive post intubation, likely due to sedatives  required heavy sedation and paralytics to achieve vent synchrony post intubation to maintain oxygenation  on high PEEP and FIO2 settings  s/p bronchoscopy today with lavage        ## ROS:  [x] limited due to BiPAP use  + fever  - chills  + SOB  + cough  - diarrhea  - abdominal pain  - arthralgias      ## Labs:  CBC:                        12.7   8.93  )-----------( 102      ( 21 Jul 2019 05:49 )             37.3     Chem:  07-21    133<L>  |  104  |  5<L>  ----------------------------<  121<H>  3.0<L>   |  22  |  0.95    Ca    7.9<L>      21 Jul 2019 05:49  Phos  1.3     07-21  Mg     2.0     07-21    TPro  6.9  /  Alb  2.5<L>  /  TBili  0.6  /  AST  102<H>  /  ALT  43  /  AlkPhos  61  07-21    Coags:  PT/INR - ( 21 Jul 2019 05:49 )   PT: 14.9 sec;   INR: 1.32 ratio    PTT - ( 21 Jul 2019 05:49 )  PTT:28.4 sec    Lactate, Blood (07.21.19 @ 05:47)    Lactate, Blood: 1.4 mmol/L      ## Imaging:  CXR < from: Xray Chest 1 View- PORTABLE-Urgent (07.21.19 @ 17:45) >  Diffuse advanced alveolar infiltrates now seen in all lung fields.    Endotracheal tube and nasogastric tube are in place.      CTA chest < from: CT Angio Chest w/ IV Cont (07.21.19 @ 04:47) >  1. Motion artifact limits this study.   2. No evidence of pulmonary embolism.   3. No evidence of thoracic aortic dissection.   4. Moderate-marked bilateral parenchymal areas suspicious for alveolitis and/or areas consolidation/pneumonia.   5. Mild right pleural effusion.          ## Medications:  levoFLOXacin IVPB 750 milliGRAM(s) IV Intermittent every 24 hours  meropenem  IVPB 500 milliGRAM(s) IV Intermittent every 8 hours  vancomycin  IVPB 1000 milliGRAM(s) IV Intermittent every 8 hours    norepinephrine Infusion 0.05 MICROgram(s)/kG/Min IV Continuous <Continuous>    ALBUTerol/ipratropium for Nebulization 3 milliLiter(s) Nebulizer every 6 hours  guaiFENesin    Syrup 200 milliGRAM(s) Oral every 6 hours PRN    hydrocortisone sodium succinate Injectable 50 milliGRAM(s) IV Push every 6 hours  insulin lispro (HumaLOG) corrective regimen sliding scale   SubCutaneous every 6 hours    enoxaparin Injectable 40 milliGRAM(s) SubCutaneous daily    pantoprazole  Injectable 40 milliGRAM(s) IV Push daily    acetaminophen   Tablet .. 650 milliGRAM(s) Oral every 6 hours PRN  cisatracurium Infusion 3 MICROgram(s)/kG/Min IV Continuous <Continuous>  dexmedetomidine Infusion 0.2 MICROgram(s)/kG/Hr IV Continuous <Continuous>  fentaNYL   Infusion. 0.5 MICROgram(s)/kG/Hr IV Continuous <Continuous>  ibuprofen  Tablet. 400 milliGRAM(s) Oral every 6 hours PRN  propofol Infusion 50 MICROgram(s)/kG/Min IV Continuous <Continuous>      ## Vitals:  T(C): 38.2 (07-21-19 @ 19:30), Max: 40 (07-21-19 @ 03:16)  HR: 97 (07-21-19 @ 20:15) (95 - 170)  BP: 106/67 (07-21-19 @ 20:15) (103/65 - 156/89)  BP(mean): 78 (07-21-19 @ 20:15) (71 - 119)  RR: 26 (07-21-19 @ 20:15) (16 - 45)  SpO2: 96% (07-21-19 @ 20:15) (87% - 100%)    Vent: Mode: AC/ CMV (Assist Control/ Continuous Mandatory Ventilation), RR (machine): 22, RR (patient): 22, TV (machine): 480, FiO2: 100, PEEP: 15, PIP: 35  ABG: ABG - ( 21 Jul 2019 17:30 )  pH, Arterial:    pH, Blood: 7.29  /  pCO2: 49    /  pO2: 102   / HCO3: 22    / Base Excess: -4.1  /  SaO2: 97              07-20 @ 07:01  -  07-21 @ 07:00  --------------------------------------------------------  IN: 2180 mL / OUT: 500 mL / NET: 1680 mL    07-21 @ 07:01 - 07-21 @ 20:21  --------------------------------------------------------  IN: 3089.4 mL / OUT: 400 mL / NET: 2689.4 mL          ## P/E:  Gen: well developed, well nourished male, orally intubated, sedated  HEENT: PERRL, ETT in place  Resp: CTA B/L, no wheeze, no rales  CVS: RRR  Abd: soft NT/ND +BS  Ext: no c/c/e  Neuro: A&Ox3, moving all extremities prior to intubation    CENTRAL LINE: [x] YES [ ] NO  LOCATION:   DATE INSERTED:  REMOVE: [ ] YES [x] NO      CRAMER: [x] YES [ ] NO    DATE INSERTED:  REMOVE:  [ ] YES [x] NO      A-LINE:  [x] YES [ ] NO  LOCATION:   DATE INSERTED:  REMOVE:  [ ] YES [x] NO      GLOBAL ISSUE/BEST PRACTICE:  Analgesia: n/a  Sedation: Precedex propofol, fentanyl  HOB elevation: yes  Stress ulcer prophylaxis: protonix  VTE prophylaxis: lovenox  Oral Care: chlorhexidine   Glycemic control: n/a  Nutrition: npo, tube feeds once respiratory status stabilized     CODE STATUS: [x] full code  [ ] DNR  [ ] DNI  [ ] MOLST  Goals of care discussion: [x] yes

## 2019-07-21 NOTE — PROVIDER CONTACT NOTE (EICU) - SITUATION
32 yo male came in with diarrhea and was found to have RLL consolidation . had multiple RRT's for fever and respiratory ditress

## 2019-07-21 NOTE — PROGRESS NOTE ADULT - SUBJECTIVE AND OBJECTIVE BOX
Patient is a 33y old  Male who presents with a chief complaint of Pneumonia. (2019 19:01)      HPI:  33 years old male here c/o fever nausea and non-bloody diarrhea with diffused abd pain since yesterday. Pt denies headache, dizziness, blurred vision, light sensitivities, focal/distal weakness or numbness, neck/back pain, cough, sob, chest pain, vomiting, dysuria, hematuria or irregular bowel movements. Pt also denies recent traveling.   CXR showed RLL infiltrates, Abd CT revealed RLL PNA, he was started on IVF, IV antibiotics. (2019 18:39)      INTERVAL HPI/OVERNIGHT EVENTS:  CCU #2    Chart reviewed. SOB (+) . Diarrhea better ( less often, becoming more solid ). No abdominal pain. Tolerating solid diet    MEDICATIONS  (STANDING):  ALBUTerol/ipratropium for Nebulization 3 milliLiter(s) Nebulizer every 6 hours  ascorbic acid IVPB 1500 milliGRAM(s) IV Intermittent <User Schedule>  bismuth subsalicylate Liquid 15 milliLiter(s) Oral three times a day  chlorhexidine 4% Liquid 1 Application(s) Topical <User Schedule>  dextrose 5%. 1000 milliLiter(s) (50 mL/Hr) IV Continuous <Continuous>  dextrose 50% Injectable 12.5 Gram(s) IV Push once  dextrose 50% Injectable 25 Gram(s) IV Push once  dextrose 50% Injectable 25 Gram(s) IV Push once  enoxaparin Injectable 40 milliGRAM(s) SubCutaneous daily  hydrocortisone sodium succinate Injectable 50 milliGRAM(s) IV Push every 6 hours  insulin lispro (HumaLOG) corrective regimen sliding scale   SubCutaneous every 6 hours  lactated ringers. 1000 milliLiter(s) (100 mL/Hr) IV Continuous <Continuous>  levoFLOXacin IVPB 750 milliGRAM(s) IV Intermittent every 24 hours  meropenem  IVPB 500 milliGRAM(s) IV Intermittent every 8 hours  nicotine -  14 mG/24Hr(s) Patch 1 patch Transdermal daily  pantoprazole  Injectable 40 milliGRAM(s) IV Push daily  thiamine IVPB 200 milliGRAM(s) IV Intermittent <User Schedule>  vancomycin  IVPB 1000 milliGRAM(s) IV Intermittent every 8 hours    MEDICATIONS  (PRN):  acetaminophen   Tablet .. 650 milliGRAM(s) Oral every 6 hours PRN Temp greater or equal to 38C (100.4F), Mild Pain (1 - 3)  dextrose 40% Gel 15 Gram(s) Oral once PRN Blood Glucose LESS THAN 70 milliGRAM(s)/deciliter  glucagon  Injectable 1 milliGRAM(s) IntraMuscular once PRN Glucose LESS THAN 70 milligrams/deciliter  guaiFENesin    Syrup 200 milliGRAM(s) Oral every 6 hours PRN Cough  ibuprofen  Tablet. 400 milliGRAM(s) Oral every 6 hours PRN Temp greater or equal to 38C (100.4F)      FAMILY HISTORY:  FH: HTN (hypertension)      Allergies    No Known Allergies    Intolerances        PMH/PSH:  No pertinent family history  No significant past surgical history        REVIEW OF SYSTEMS:  CONSTITUTIONAL: No fever, weight loss,   EYES: No eye pain, visual disturbances, or discharge  ENMT:  No difficulty hearing, tinnitus, vertigo; No sinus or throat pain  NECK: No pain or stiffness  BREASTS: No pain, masses, or nipple discharge  RESPIRATORY: No hemoptysis;   CARDIOVASCULAR: No chest pain, palpitations, dizziness, or leg swelling  GASTROINTESTINAL: See above  GENITOURINARY: No dysuria, frequency, hematuria, or incontinence  NEUROLOGICAL: No headaches, memory loss, loss of strength, numbness, or tremors  SKIN: No itching, burning, rashes, or lesions   LYMPH NODES: No enlarged glands  ENDOCRINE: No heat or cold intolerance; No hair loss  MUSCULOSKELETAL: No joint pain or swelling; No muscle, back, or extremity pain  PSYCHIATRIC: No depression, anxiety, mood swings, or difficulty sleeping  HEME/LYMPH: No easy bruising, or bleeding gums  ALLERGY AND IMMUNOLOGIC: No hives or eczema    Vital Signs Last 24 Hrs  T(C): 38.3 (2019 07:45), Max: 40.2 (2019 13:33)  T(F): 100.9 (2019 07:45), Max: 104.4 (2019 13:33)  HR: 98 (2019 11:56) (97 - 136)  BP: 130/72 (2019 08:00) (104/68 - 151/94)  BP(mean): 91 (2019 08:00) (81 - 98)  RR: 22 (2019 08:00) (20 - 41)  SpO2: 95% (2019 11:56) (87% - 98%)    PHYSICAL EXAM:  GENERAL: NAD, well-groomed, well-developed  HEAD:  Atraumatic, Normocephalic  EYES: EOMI, PERRLA, conjunctiva and sclera clear  NECK: Supple, No JVD, Normal thyroid  NERVOUS SYSTEM:  Alert & Oriented X3, Good concentration; Motor Strength 5/5 B/L upper and lower extremities;   CHEST/LUNG:  No rales, Rhonchi (+) No wheezing, or rubs  HEART: Regular rate and rhythm; No murmurs, rubs, or gallops  ABDOMEN: Soft, Nontender, Nondistended; Bowel sounds present  EXTREMITIES:  2+ Peripheral Pulses, No clubbing, cyanosis, or edema  LYMPH: No lymphadenopathy noted  SKIN: No rashes or lesions    LAB                          12.7   8.93  )-----------( 102      ( 2019 05:49 )             37.3       CBC:   @ 05:49  WBC 8.93   Hgb 12.7   Hct 37.3   Plts 102  MCV 85.0   @ 07:11  WBC 11.00   Hgb 13.1   Hct 39.8   Plts 115  MCV 87.3   @ 16:37  WBC 12.25   Hgb 14.6   Hct 44.4   Plts 142  MCV 87.6      Chemistry:   @ 05:49  Na+ 133  K+ 3.0  Cl- 104  CO2 22  BUN 5  Cr 0.95      @ 07:11  Na+ 136  K+ 3.4  Cl- 104  CO2 20  BUN 6  Cr 1.10      @ 16:37  Na+ 131  K+ 3.3  Cl- 98  CO2 24  BUN 10  Cr 1.54         Glucose, Serum: 121 mg/dL ( @ 05:49)  Glucose, Serum: 110 mg/dL ( @ 07:11)  Glucose, Serum: 155 mg/dL ( @ 16:37)      2019 05:49    133    |  104    |  5      ----------------------------<  121    3.0     |  22     |  0.95   2019 07:11    136    |  104    |  6      ----------------------------<  110    3.4     |  20     |  1.10   2019 16:37    131    |  98     |  10     ----------------------------<  155    3.3     |  24     |  1.54     Ca    7.9        2019 05:49  Ca    7.3        2019 07:11  Ca    8.8        2019 16:37  Phos  1.3       2019 05:49  Mg     2.0       2019 05:49    TPro  6.9    /  Alb  2.5    /  TBili  0.6    /  DBili  x      /  AST  102    /  ALT  43     /  AlkPhos  61     2019 05:49  TPro  6.8    /  Alb  2.8    /  TBili  0.8    /  DBili  x      /  AST  60     /  ALT  43     /  AlkPhos  63     2019 07:11  TPro  8.4    /  Alb  3.4    /  TBili  0.7    /  DBili  x      /  AST  26     /  ALT  43     /  AlkPhos  76     2019 16:37      PT/INR - ( 2019 05:49 )   PT: 14.9 sec;   INR: 1.32 ratio         PTT - ( 2019 05:49 )  PTT:28.4 sec    Urinalysis Basic - ( 2019 10:33 )    Color: Gisselle / Appearance: Slightly Turbid / S.015 / pH: x  Gluc: x / Ketone: Trace  / Bili: Negative / Urobili: Negative mg/dL   Blood: x / Protein: 100 mg/dL / Nitrite: Negative   Leuk Esterase: Trace / RBC: 3-5 /HPF / WBC 3-5   Sq Epi: x / Non Sq Epi: x / Bacteria: Occasional        CAPILLARY BLOOD GLUCOSE      POCT Blood Glucose.: 125 mg/dL (2019 11:19)  POCT Blood Glucose.: 132 mg/dL (2019 06:47)  POCT Blood Glucose.: 158 mg/dL (2019 03:59)          RADIOLOGY & ADDITIONAL TESTS:    Imaging Personally Reviewed:  [ ] YES  [ ] NO    Consultant(s) Notes Reviewed:  [ ] YES  [ ] NO    Care Discussed with Consultants/Other Providers [ ] YES  [ ] NO

## 2019-07-21 NOTE — CHART NOTE - NSCHARTNOTEFT_GEN_A_CORE
Pt is a 33yr old man without known PMHx, with current tobacco use who presents to the ER on 7/19 with chief complaint of fever/nausea/nonbloody diarrhea, diffuse abdominal pain and was admitted with sepsis secondary to right lobe pneumonia and dehydration. RRT called 7/20 for hypoxia/fever to 104, started on bipap, pending chest CT.    24hr events: Second RRT called at approx 0330 for fever 104 refractory to cooling blanket.     ICU Vital Signs Last 24 Hrs  T(C): 40 (21 Jul 2019 03:16), Max: 40.2 (20 Jul 2019 13:33)  T(F): 104 (21 Jul 2019 03:16), Max: 104.4 (20 Jul 2019 13:33)  HR: 136 (21 Jul 2019 03:16) (97 - 136)  BP: 151/94 (21 Jul 2019 03:16) (122/80 - 151/94)  BP(mean): --  ABP: --  ABP(mean): --  RR: 20 (21 Jul 2019 03:16) (18 - 22)  SpO2: 94% (21 Jul 2019 03:16) (93% - 98%)    CBC Full  -  ( 20 Jul 2019 07:11 )  WBC Count : 11.00 K/uL  RBC Count : 4.56 M/uL  Hemoglobin : 13.1 g/dL  Hematocrit : 39.8 %  Platelet Count - Automated : 115 K/uL  Mean Cell Volume : 87.3 fl  Mean Cell Hemoglobin : 28.7 pg  Mean Cell Hemoglobin Concentration : 32.9 gm/dL  Auto Neutrophil # : x  Auto Lymphocyte # : x  Auto Monocyte # : x  Auto Eosinophil # : x  Auto Basophil # : x  Auto Neutrophil % : x  Auto Lymphocyte % : x  Auto Monocyte % : x  Auto Eosinophil % : x  Auto Basophil % : x    PT/INR - ( 19 Jul 2019 16:37 )   PT: 15.1 sec;   INR: 1.34 ratio         PTT - ( 19 Jul 2019 16:37 )  PTT:29.1 sec    07-20    136  |  104  |  6<L>  ----------------------------<  110<H>  3.4<L>   |  20<L>  |  1.10    Ca    7.3<L>      20 Jul 2019 07:11    TPro  6.8  /  Alb  2.8<L>  /  TBili  0.8  /  DBili  x   /  AST  60<H>  /  ALT  43  /  AlkPhos  63  07-20    ABG - ( 20 Jul 2019 15:06 )  pH, Arterial: x     pH, Blood: 7.49  /  pCO2: 25    /  pO2: 70    / HCO3: 19    / Base Excess: -2.8  /  SaO2: 96            CULTURES:    Culture - Blood (collected 20 Jul 2019 00:42)  Source: .Blood  Preliminary Report (21 Jul 2019 01:05):    No growth to date.    Culture - Blood (collected 20 Jul 2019 00:42)  Source: .Blood  Preliminary Report (21 Jul 2019 01:05):    No growth to date.    I&O's Summary    20 Jul 2019 07:01  -  21 Jul 2019 03:51  --------------------------------------------------------  IN: 1130 mL / OUT: 500 mL / NET: 630 mL      MEDICATIONS  (STANDING):  ALBUTerol/ipratropium for Nebulization 3 milliLiter(s) Nebulizer every 6 hours  azithromycin  IVPB 500 milliGRAM(s) IV Intermittent every 24 hours  bismuth subsalicylate Liquid 15 milliLiter(s) Oral three times a day  enoxaparin Injectable 40 milliGRAM(s) SubCutaneous daily  metroNIDAZOLE    Tablet 500 milliGRAM(s) Oral every 8 hours  nicotine -  14 mG/24Hr(s) Patch 1 patch Transdermal daily  piperacillin/tazobactam IVPB.. 3.375 Gram(s) IV Intermittent every 8 hours    MEDICATIONS  (PRN):  acetaminophen   Tablet .. 650 milliGRAM(s) Oral every 6 hours PRN Temp greater or equal to 38C (100.4F), Mild Pain (1 - 3)  guaiFENesin    Syrup 200 milliGRAM(s) Oral every 6 hours PRN Cough    No Known Allergies    FAMILY HISTORY:  FH: HTN (hypertension)    PAST MEDICAL & SURGICAL HISTORY:  No pertinent family history  No significant past surgical history    RADIOLOGY/IMAGING/ECHO  < from: CT Abdomen and Pelvis w/ Oral Cont and w/ IV Cont (07.19.19 @ 17:54) >    IMPRESSION:     1. Rightlower lobe pneumonia.  2. No CT evidence of colitis or bowel obstruction.    < end of copied text >    Social History: Per review of chart, tobacco use current, social alcohol use. independent ADLs, ambulatory.   ROS:     Physical Examination:  General:   Neuro:  HEENT:   PULM:   CVS:   ABD:   EXT:   SKIN:     Pt is a 33yr old man now being transferred to the ICU for management of hypoxic respiratory failure secondary to pneumonia +/- PE, sepsis with associated fever/tachycardia, metabolic acidosis, thrombocytopenia, coagulopathy.    Neuro:  --Pain management prn    Pulm:  --Continue bipap  --ABG  --Chest CTA    Cardiovascular: Tachycardia likely secondary to fever  --f/u bnp  --Pt with noted elevated ptt/inr on admission, worsening thrombocytopenia, concerning for hepatic dysfunction, related to worsening sepsis, will get stat coags/fibrinogen/cbc    ID:  --f/u cultures  --UA  --Legionella/strep/mycoplasma studies  --F/U HIV study  --Acute hepatitis panel  --Continue Zosyn/Azith  --Will add one time dose of Vanc, continue as clinically warranted  --Lactic acid  --Fever refractory to acetaminophen, will give motrin, continue cooling blanket    GI: Questionable gastroenteritis  --f/u stool studies   --GI consult    : Worsening metabolic acidosis, serum bicarb on admission 24 (7/19) and was 20 in the a.m of 7/20  --D5LR for IVF as tolerated  --Replete electrolytes prn    Endo:  --FS with sliding scale coverage    Prophylaxis:  --Change lovenox to SCDs for now  --PPI Pt is a 33yr old man without known PMHx, with current tobacco use who presents to the ER on 7/19 with chief complaint of fever/nausea/nonbloody diarrhea, diffuse abdominal pain and was admitted with sepsis secondary to right lobe pneumonia and dehydration. RRT called 7/20 for hypoxia/fever to 104, started on bipap, pending chest CT.    24hr events: Second RRT called at approx 0330 for fever 104 refractory to cooling blanket.     ICU Vital Signs Last 24 Hrs  T(C): 40 (21 Jul 2019 03:16), Max: 40.2 (20 Jul 2019 13:33)  T(F): 104 (21 Jul 2019 03:16), Max: 104.4 (20 Jul 2019 13:33)  HR: 136 (21 Jul 2019 03:16) (97 - 136)  BP: 151/94 (21 Jul 2019 03:16) (122/80 - 151/94)  BP(mean): --  ABP: --  ABP(mean): --  RR: 20 (21 Jul 2019 03:16) (18 - 22)  SpO2: 94% (21 Jul 2019 03:16) (93% - 98%)    CBC Full  -  ( 20 Jul 2019 07:11 )  WBC Count : 11.00 K/uL  RBC Count : 4.56 M/uL  Hemoglobin : 13.1 g/dL  Hematocrit : 39.8 %  Platelet Count - Automated : 115 K/uL  Mean Cell Volume : 87.3 fl  Mean Cell Hemoglobin : 28.7 pg  Mean Cell Hemoglobin Concentration : 32.9 gm/dL  Auto Neutrophil # : x  Auto Lymphocyte # : x  Auto Monocyte # : x  Auto Eosinophil # : x  Auto Basophil # : x  Auto Neutrophil % : x  Auto Lymphocyte % : x  Auto Monocyte % : x  Auto Eosinophil % : x  Auto Basophil % : x    PT/INR - ( 19 Jul 2019 16:37 )   PT: 15.1 sec;   INR: 1.34 ratio         PTT - ( 19 Jul 2019 16:37 )  PTT:29.1 sec    07-20    136  |  104  |  6<L>  ----------------------------<  110<H>  3.4<L>   |  20<L>  |  1.10    Ca    7.3<L>      20 Jul 2019 07:11    TPro  6.8  /  Alb  2.8<L>  /  TBili  0.8  /  DBili  x   /  AST  60<H>  /  ALT  43  /  AlkPhos  63  07-20    ABG - ( 20 Jul 2019 15:06 )  pH, Arterial: x     pH, Blood: 7.49  /  pCO2: 25    /  pO2: 70    / HCO3: 19    / Base Excess: -2.8  /  SaO2: 96            CULTURES:    Culture - Blood (collected 20 Jul 2019 00:42)  Source: .Blood  Preliminary Report (21 Jul 2019 01:05):    No growth to date.    Culture - Blood (collected 20 Jul 2019 00:42)  Source: .Blood  Preliminary Report (21 Jul 2019 01:05):    No growth to date.    I&O's Summary    20 Jul 2019 07:01  -  21 Jul 2019 03:51  --------------------------------------------------------  IN: 1130 mL / OUT: 500 mL / NET: 630 mL      MEDICATIONS  (STANDING):  ALBUTerol/ipratropium for Nebulization 3 milliLiter(s) Nebulizer every 6 hours  azithromycin  IVPB 500 milliGRAM(s) IV Intermittent every 24 hours  bismuth subsalicylate Liquid 15 milliLiter(s) Oral three times a day  enoxaparin Injectable 40 milliGRAM(s) SubCutaneous daily  metroNIDAZOLE    Tablet 500 milliGRAM(s) Oral every 8 hours  nicotine -  14 mG/24Hr(s) Patch 1 patch Transdermal daily  piperacillin/tazobactam IVPB.. 3.375 Gram(s) IV Intermittent every 8 hours    MEDICATIONS  (PRN):  acetaminophen   Tablet .. 650 milliGRAM(s) Oral every 6 hours PRN Temp greater or equal to 38C (100.4F), Mild Pain (1 - 3)  guaiFENesin    Syrup 200 milliGRAM(s) Oral every 6 hours PRN Cough    No Known Allergies    FAMILY HISTORY:  FH: HTN (hypertension)    PAST MEDICAL & SURGICAL HISTORY:  No pertinent family history  No significant past surgical history    RADIOLOGY/IMAGING/ECHO  < from: CT Abdomen and Pelvis w/ Oral Cont and w/ IV Cont (07.19.19 @ 17:54) >    IMPRESSION:     1. Rightlower lobe pneumonia.  2. No CT evidence of colitis or bowel obstruction.    < end of copied text >    Social History: Per review of chart, tobacco use current, social alcohol use. independent ADLs, ambulatory. . Denies recent travel. States sister was sick but resolved, not as severe as his current condition.   ROS: Pt denies chest pain/dysuria/nausea/vomiting. Endorses dyspnea on any movement, SOB without bipap, mild headache, productive cough with blood tinged sputum.     Physical Examination:  General: In mild distress, diaphoretic   Neuro: No overt deficit appreciated, awake/alert and answering questions appropriately  HEENT: Atraumatic, on bipap  PULM: 10/5 50% increased to 14/5 and 70% pt states improved work of breathing, no crackles appreciated but diminish bilaterally with tachypnea in low 20s  CVS: s1s2 tachycardia  ABD: +bs, no tenderness with palpation, pt states initially with diffuse upper abdominal pain  EXT: moving x4  SKIN: Warm, diaphoretic     Pt is a 33yr old man now being transferred to the ICU for management of hypoxic respiratory failure secondary to pneumonia +/- PE, sepsis with associated fever/tachycardia, metabolic acidosis, thrombocytopenia, coagulopathy.    Neuro:  --Pain management prn    Pulm:  --Continue bipap  --ABG  --Chest CTA    Cardiovascular: Tachycardia likely secondary to fever  --f/u bnp  --Pt with noted elevated ptt/inr on admission, worsening thrombocytopenia, concerning for hepatic dysfunction, related to worsening sepsis, will get stat coags/fibrinogen/cbc    ID:  --f/u cultures  --UA  --Legionella/strep/mycoplasma studies  --F/U HIV study  --Acute hepatitis panel  --Continue Zosyn/Azith  --Will add one time dose of Vanc, continue as clinically warranted  --Lactic acid  --Fever refractory to acetaminophen, will give motrin, continue cooling blanket    GI: Questionable gastroenteritis  --f/u stool studies   --GI consult    : Worsening metabolic acidosis, serum bicarb on admission 24 (7/19) and was 20 in the a.m of 7/20  --D5LR for IVF as tolerated  --Replete electrolytes prn    Endo:  --FS with sliding scale coverage    Prophylaxis:  --Change lovenox to SCDs for now  --PPI

## 2019-07-21 NOTE — PROCEDURE NOTE - GENERAL PROCEDURE DETAILS
bronchoscope passed through ETT, both lung fields evaluated.  Mucosa appeared WNL, there was small of amount of thin secretions.  No obvious bleeding or lesions noted..   Multiple lavage washings done and sample obtained for culture and cyto. bronchoscope passed through ETT, both lung fields evaluated.  Mucosa appeared WNL, there was small of amount of thin secretions.  No obvious bleeding or lesions noted..   Multiple lavage washings done in bilateral lower lobes and sample obtained for culture and cyto.

## 2019-07-21 NOTE — PROCEDURE NOTE - NSPROCDETAILS_GEN_ALL_CORE
sterile technique, catheter placed/ultrasound guidance/guidewire recovered/sterile dressing applied/lumen(s) aspirated and flushed

## 2019-07-21 NOTE — PROCEDURE NOTE - NSINDICATIONS_GEN_A_CORE
critical illness/emergency venous access
monitoring purposes/critical patient/arterial puncture to obtain ABG's/blood sampling
critical patient/respiratory failure

## 2019-07-21 NOTE — PROGRESS NOTE ADULT - SUBJECTIVE AND OBJECTIVE BOX
INTERVAL HPI:  33 years old male with no significant medical history here c/o fever nausea and non-bloody diarrhea with diffused abd pain for one day. Started about 4 days prior to that with right lower chest, back and knee pain along with profuse diarrhea. Also with diffuse body ache, sweating and chills. Then started with sob.  Admitted with RLL pneumonia.  19: With increasing Respiratory distress, temperature up to 104 with chills. RRT was called, got started on BIPAP support with some improvement.   Active smoker 1 ppd. uses alcohol and Marijuana socially.  19:  Transferred to ICU due to worsening Respiratory status.    OVERNIGHT EVENTS:  In ICU, on BIPAP with 90% FIO2.    Vital Signs Last 24 Hrs  T(C): 37.9 (2019 15:55), Max: 40 (2019 03:16)  T(F): 100.2 (2019 15:55), Max: 104 (2019 03:16)  HR: 99 (2019 14:00) (95 - 136)  BP: 114/74 (2019 14:00) (103/65 - 151/94)  BP(mean): 88 (2019 14:00) (71 - 107)  RR: 16 (2019 14:00) (16 - 45)  SpO2: 96% (2019 14:00) (87% - 98%)    PHYSICAL EXAM:  GEN:         Awake, responsive and comfortable.  HEENT:     BIPAP.  RESP:      no wheezing.     CVS:          Regular rate and rhythm.   ABD:         Soft, non-tender, non-distended;     MEDICATIONS  (STANDING):  ALBUTerol/ipratropium for Nebulization 3 milliLiter(s) Nebulizer every 6 hours  ascorbic acid IVPB 1500 milliGRAM(s) IV Intermittent <User Schedule>  bismuth subsalicylate Liquid 15 milliLiter(s) Oral three times a day  chlorhexidine 0.12% Liquid 15 milliLiter(s) Oral Mucosa two times a day  chlorhexidine 4% Liquid 1 Application(s) Topical <User Schedule>  cisatracurium Infusion 3 MICROgram(s)/kG/Min (18.846 mL/Hr) IV Continuous <Continuous>  cisatracurium Injectable 10 milliGRAM(s) IV Push once  dexmedetomidine Infusion 0.2 MICROgram(s)/kG/Hr (5.235 mL/Hr) IV Continuous <Continuous>  dextrose 5%. 1000 milliLiter(s) (50 mL/Hr) IV Continuous <Continuous>  dextrose 50% Injectable 12.5 Gram(s) IV Push once  dextrose 50% Injectable 25 Gram(s) IV Push once  dextrose 50% Injectable 25 Gram(s) IV Push once  enoxaparin Injectable 40 milliGRAM(s) SubCutaneous daily  fentaNYL   Infusion. 0.5 MICROgram(s)/kG/Hr (5.235 mL/Hr) IV Continuous <Continuous>  hydrocortisone sodium succinate Injectable 50 milliGRAM(s) IV Push every 6 hours  insulin lispro (HumaLOG) corrective regimen sliding scale   SubCutaneous every 6 hours  lactated ringers. 1000 milliLiter(s) (100 mL/Hr) IV Continuous <Continuous>  levoFLOXacin IVPB 750 milliGRAM(s) IV Intermittent every 24 hours  meropenem  IVPB 500 milliGRAM(s) IV Intermittent every 8 hours  nicotine -  14 mG/24Hr(s) Patch 1 patch Transdermal daily  norepinephrine Infusion 0.05 MICROgram(s)/kG/Min (9.816 mL/Hr) IV Continuous <Continuous>  pantoprazole  Injectable 40 milliGRAM(s) IV Push daily  propofol Infusion 10 MICROgram(s)/kG/Min (6.282 mL/Hr) IV Continuous <Continuous>  propofol Infusion 50 MICROgram(s)/kG/Min (31.41 mL/Hr) IV Continuous <Continuous>  propofol Injectable 350 milliGRAM(s) IV Push once  propofol Injectable 50 milliGRAM(s) IV Push once  rocuronium Injectable 50 milliGRAM(s) IV Push once  rocuronium Injectable 50 milliGRAM(s) IV Push once  thiamine IVPB 200 milliGRAM(s) IV Intermittent <User Schedule>  vancomycin  IVPB 1000 milliGRAM(s) IV Intermittent every 8 hours    MEDICATIONS  (PRN):  acetaminophen   Tablet .. 650 milliGRAM(s) Oral every 6 hours PRN Temp greater or equal to 38C (100.4F), Mild Pain (1 - 3)  dextrose 40% Gel 15 Gram(s) Oral once PRN Blood Glucose LESS THAN 70 milliGRAM(s)/deciliter  glucagon  Injectable 1 milliGRAM(s) IntraMuscular once PRN Glucose LESS THAN 70 milligrams/deciliter  guaiFENesin    Syrup 200 milliGRAM(s) Oral every 6 hours PRN Cough  ibuprofen  Tablet. 400 milliGRAM(s) Oral every 6 hours PRN Temp greater or equal to 38C (100.4F)    LABS:                        12.7   8.93  )-----------( 102      ( 2019 05:49 )             37.3         133<L>  |  104  |  5<L>  ----------------------------<  121<H>  3.0<L>   |  22  |  0.95    Ca    7.9<L>      2019 05:49  Phos  1.3       Mg     2.0         TPro  6.9  /  Alb  2.5<L>  /  TBili  0.6  /  DBili  x   /  AST  102<H>  /  ALT  43  /  AlkPhos  61      PT/INR - ( 2019 05:49 )   PT: 14.9 sec;   INR: 1.32 ratio      PTT - ( 2019 05:49 )  PTT:28.4 sec   @ 06:17  pH: 7.46  pCO2: 31  pO2: 82  SaO2: 97   @ 15:06  pH: 7.49  pCO2: 25  pO2: 70  SaO2: 96    Urinalysis Basic - ( 2019 10:33 )    Color: Gisselle / Appearance: Slightly Turbid / S.015 / pH: x  Gluc: x / Ketone: Trace  / Bili: Negative / Urobili: Negative mg/dL   Blood: x / Protein: 100 mg/dL / Nitrite: Negative   Leuk Esterase: Trace / RBC: 3-5 /HPF / WBC 3-5   Sq Epi: x / Non Sq Epi: x / Bacteria: Occasional  < from: CT Angio Chest w/ IV Cont (19 @ 04:47) >    EXAM:  CT ANGIO CHEST (W)AW IC                          PROCEDURE DATE:  2019      INTERPRETATION:  [facility] RADIOLOGIST PRELIMINARY REPORT    EXAM:    CT Angiography Chest With Contrast     EXAM DATE/TIME:    2019 4:31 AM     CLINICALHISTORY:    33 years old, male; Shortness of breath and wheezing; Patient HX:   Elevated   ddimer, hypoxic, rule out pe     TECHNIQUE:    Imaging protocol: Axial computed tomographic angiography images of the   chest   with intravenous contrast usingCT angiography protocol. Coronal and   sagittal   reformatted images were created and reviewed.    3D rendering: MIP reconstructed images were created and reviewed.    Contrast material: OMNIPAQUE 350; Contrast volume: 75 ml; Contrast   route: R   AC;     COMPARISON:    DX XR CHEST IMMEDIATE 2019 4:40 PM     FINDINGS:    Limitations: Motion artifact limits this study.    Pulmonary arteries: Adequate contrast enhancement of the pulmonary   arteries.    Aorta: No evidence of thoracic aortic dissection.    Lungs: No evidence endobronchial lesion. Moderate-marked bilateral   coarse and   hazy air space opacity involving both lungs, lower lobes greater than   upper   lobes.    Pleural space: Mild right pleural effusion. No evidence of pneumothorax.    Heart: Heart appears within normal limits, no pericardial effusion. No   evidence of filling defects in the cardiac chambers.    Lymph nodes: Few up to 1 cm mediastinal lymph nodes.    Bones/joints: Musculoskeletal structures appear intact.    Soft tissues: Unremarkable.     IMPRESSION:   1. Motion artifact limits this study.   2. No evidence of pulmonary embolism.   3. No evidence of thoracic aortic dissection.   4. Moderate-marked bilateral parenchymal areas suspicious for alveolitis   and/or   areas consolidation/pneumonia.   5. Mild right pleural effusion.    I'm in agreement with the above preliminary interpretation. Again there   is extensive airspace disease reflective of pneumonia and/or pulmonary   edema. Further clinical assessment and follow-up recommended.    VIDA DENNIS M.D., ATTENDING RADIOLOGIST  This document has been electronically signed. 2019  8:34AM      ASSESSMENT AND PLAN:  ·	Acute Hypoxic Respiratory failure.  ·	RLL Pneumonia.  ·	ARDS.  ·	Sepsis.  ·	Diarrhea.  ·	Hypokalemia.  ·	Tobacco abuse.  ·	Obesity.    Continue BIPAP, ICU planning intubation.  Antibiotics changed to Meropenem, Levaquin and Vancomycin.  HIV & Flu panel negative.  Follow Legionella & Mycoplasma titres.  Follow serologies.  Replace potassium, follow electrolytes.

## 2019-07-22 LAB
4/8 RATIO: 3.4 RATIO — SIGNIFICANT CHANGE UP (ref 0.9–3.6)
ABS CD8: 65 /UL — LOW (ref 142–740)
ANION GAP SERPL CALC-SCNC: 9 MMOL/L — SIGNIFICANT CHANGE UP (ref 5–17)
ANISOCYTOSIS BLD QL: SLIGHT — SIGNIFICANT CHANGE UP
APTT BLD: 26.4 SEC — LOW (ref 27.5–36.3)
BASE EXCESS BLDA CALC-SCNC: -1.9 MMOL/L — SIGNIFICANT CHANGE UP (ref -2–2)
BASE EXCESS BLDA CALC-SCNC: -5.4 MMOL/L — LOW (ref -2–2)
BASE EXCESS BLDA CALC-SCNC: 0 MMOL/L — SIGNIFICANT CHANGE UP (ref -2–2)
BASOPHILS # BLD AUTO: 0 K/UL — SIGNIFICANT CHANGE UP (ref 0–0.2)
BASOPHILS NFR BLD AUTO: 0 % — SIGNIFICANT CHANGE UP (ref 0–2)
BLOOD GAS COMMENTS: SIGNIFICANT CHANGE UP
BLOOD GAS SOURCE: SIGNIFICANT CHANGE UP
BUN SERPL-MCNC: 10 MG/DL — SIGNIFICANT CHANGE UP (ref 7–23)
C3 SERPL-MCNC: 158 MG/DL — HIGH (ref 81–157)
C4 SERPL-MCNC: 33 MG/DL — SIGNIFICANT CHANGE UP (ref 13–39)
CALCIUM SERPL-MCNC: 8 MG/DL — LOW (ref 8.5–10.1)
CD16+CD56+ CELLS NFR BLD: 10 % — SIGNIFICANT CHANGE UP (ref 5–23)
CD16+CD56+ CELLS NFR SPEC: 36 /UL — LOW (ref 71–410)
CD19 BLASTS SPEC-ACNC: 18 % — SIGNIFICANT CHANGE UP (ref 6–24)
CD19 BLASTS SPEC-ACNC: 65 /UL — LOW (ref 84–469)
CD3 BLASTS SPEC-ACNC: 277 /UL — LOW (ref 672–1870)
CD3 BLASTS SPEC-ACNC: 70 % — SIGNIFICANT CHANGE UP (ref 59–83)
CD4 %: 53 % — SIGNIFICANT CHANGE UP (ref 30–62)
CD8 %: 16 % — SIGNIFICANT CHANGE UP (ref 12–36)
CHLORIDE SERPL-SCNC: 105 MMOL/L — SIGNIFICANT CHANGE UP (ref 96–108)
CO2 SERPL-SCNC: 23 MMOL/L — SIGNIFICANT CHANGE UP (ref 22–31)
COLOR FLD: YELLOW — SIGNIFICANT CHANGE UP
CREAT SERPL-MCNC: 1.08 MG/DL — SIGNIFICANT CHANGE UP (ref 0.5–1.3)
DACRYOCYTES BLD QL SMEAR: SLIGHT — SIGNIFICANT CHANGE UP
DSDNA AB SER-ACNC: <12 IU/ML — SIGNIFICANT CHANGE UP
EOSINOPHIL # BLD AUTO: 0 K/UL — SIGNIFICANT CHANGE UP (ref 0–0.5)
EOSINOPHIL NFR BLD AUTO: 0 % — SIGNIFICANT CHANGE UP (ref 0–6)
ERYTHROCYTE [SEDIMENTATION RATE] IN BLOOD: 87 MM/HR — HIGH (ref 0–15)
FERRITIN SERPL-MCNC: 1213 NG/ML — HIGH (ref 30–400)
FLUID SEGMENTED GRANULOCYTES: 75 % — SIGNIFICANT CHANGE UP
GLUCOSE BLDC GLUCOMTR-MCNC: 148 MG/DL — HIGH (ref 70–99)
GLUCOSE BLDC GLUCOMTR-MCNC: 157 MG/DL — HIGH (ref 70–99)
GLUCOSE BLDC GLUCOMTR-MCNC: 162 MG/DL — HIGH (ref 70–99)
GLUCOSE BLDC GLUCOMTR-MCNC: 198 MG/DL — HIGH (ref 70–99)
GLUCOSE SERPL-MCNC: 154 MG/DL — HIGH (ref 70–99)
GRAM STN FLD: SIGNIFICANT CHANGE UP
HBA1C BLD-MCNC: 5.2 % — SIGNIFICANT CHANGE UP (ref 4–5.6)
HCO3 BLDA-SCNC: 19 MMOL/L — LOW (ref 21–29)
HCO3 BLDA-SCNC: 23 MMOL/L — SIGNIFICANT CHANGE UP (ref 21–29)
HCO3 BLDA-SCNC: 24 MMOL/L — SIGNIFICANT CHANGE UP (ref 21–29)
HCT VFR BLD CALC: 34.4 % — LOW (ref 39–50)
HGB BLD-MCNC: 12 G/DL — LOW (ref 13–17)
HOROWITZ INDEX BLDA+IHG-RTO: 100 — SIGNIFICANT CHANGE UP
HOROWITZ INDEX BLDA+IHG-RTO: 50 — SIGNIFICANT CHANGE UP
HOROWITZ INDEX BLDA+IHG-RTO: SIGNIFICANT CHANGE UP
INR BLD: 1.19 RATIO — HIGH (ref 0.88–1.16)
LEGIONELLA AG UR QL: POSITIVE
LYMPHOCYTES # BLD AUTO: 0.24 K/UL — LOW (ref 1–3.3)
LYMPHOCYTES # BLD AUTO: 2 % — LOW (ref 13–44)
MAGNESIUM SERPL-MCNC: 2.5 MG/DL — SIGNIFICANT CHANGE UP (ref 1.6–2.6)
MANUAL SMEAR VERIFICATION: SIGNIFICANT CHANGE UP
MCHC RBC-ENTMCNC: 29.8 PG — SIGNIFICANT CHANGE UP (ref 27–34)
MCHC RBC-ENTMCNC: 34.9 GM/DL — SIGNIFICANT CHANGE UP (ref 32–36)
MCV RBC AUTO: 85.4 FL — SIGNIFICANT CHANGE UP (ref 80–100)
MONOCYTES # BLD AUTO: 0.47 K/UL — SIGNIFICANT CHANGE UP (ref 0–0.9)
MONOCYTES NFR BLD AUTO: 4 % — SIGNIFICANT CHANGE UP (ref 2–14)
MONOS+MACROS # FLD: 25 % — SIGNIFICANT CHANGE UP
NEUTROPHILS # BLD AUTO: 11.1 K/UL — HIGH (ref 1.8–7.4)
NEUTROPHILS NFR BLD AUTO: 94 % — HIGH (ref 43–77)
NIGHT BLUE STAIN TISS: SIGNIFICANT CHANGE UP
NRBC # BLD: 0 /100 — SIGNIFICANT CHANGE UP (ref 0–0)
NRBC # BLD: SIGNIFICANT CHANGE UP /100 WBCS (ref 0–0)
PCO2 BLDA: 33 MMHG — SIGNIFICANT CHANGE UP (ref 32–46)
PCO2 BLDA: 39 MMHG — SIGNIFICANT CHANGE UP (ref 32–46)
PCO2 BLDA: 39 MMHG — SIGNIFICANT CHANGE UP (ref 32–46)
PH BLD: 7.37 — SIGNIFICANT CHANGE UP (ref 7.35–7.45)
PH BLD: 7.38 — SIGNIFICANT CHANGE UP (ref 7.35–7.45)
PH BLD: 7.41 — SIGNIFICANT CHANGE UP (ref 7.35–7.45)
PHOSPHATE SERPL-MCNC: 2.6 MG/DL — SIGNIFICANT CHANGE UP (ref 2.5–4.5)
PLAT MORPH BLD: NORMAL — SIGNIFICANT CHANGE UP
PLATELET # BLD AUTO: 148 K/UL — LOW (ref 150–400)
PO2 BLDA: 116 MMHG — HIGH (ref 74–108)
PO2 BLDA: 78 MMHG — SIGNIFICANT CHANGE UP (ref 74–108)
PO2 BLDA: 95 MMHG — SIGNIFICANT CHANGE UP (ref 74–108)
POTASSIUM SERPL-MCNC: 3.9 MMOL/L — SIGNIFICANT CHANGE UP (ref 3.5–5.3)
POTASSIUM SERPL-SCNC: 3.9 MMOL/L — SIGNIFICANT CHANGE UP (ref 3.5–5.3)
PROTHROM AB SERPL-ACNC: 13.4 SEC — HIGH (ref 10–12.9)
RBC # BLD: 4.03 M/UL — LOW (ref 4.2–5.8)
RBC # FLD: 14.6 % — HIGH (ref 10.3–14.5)
RBC BLD AUTO: SIGNIFICANT CHANGE UP
SAO2 % BLDA: 96 % — SIGNIFICANT CHANGE UP (ref 92–96)
SAO2 % BLDA: 98 % — HIGH (ref 92–96)
SAO2 % BLDA: 99 % — HIGH (ref 92–96)
SODIUM SERPL-SCNC: 137 MMOL/L — SIGNIFICANT CHANGE UP (ref 135–145)
SPECIMEN SOURCE: SIGNIFICANT CHANGE UP
SPECIMEN SOURCE: SIGNIFICANT CHANGE UP
T-CELL CD4 SUBSET PNL BLD: 222 /UL — LOW (ref 489–1457)
VANCOMYCIN TROUGH SERPL-MCNC: 12.4 UG/ML — SIGNIFICANT CHANGE UP (ref 10–20)
WBC # BLD: 11.81 K/UL — HIGH (ref 3.8–10.5)
WBC # FLD AUTO: 11.81 K/UL — HIGH (ref 3.8–10.5)

## 2019-07-22 PROCEDURE — 71045 X-RAY EXAM CHEST 1 VIEW: CPT | Mod: 26

## 2019-07-22 PROCEDURE — 99291 CRITICAL CARE FIRST HOUR: CPT

## 2019-07-22 PROCEDURE — 99221 1ST HOSP IP/OBS SF/LOW 40: CPT

## 2019-07-22 RX ORDER — ACETAMINOPHEN 500 MG
1000 TABLET ORAL ONCE
Refills: 0 | Status: COMPLETED | OUTPATIENT
Start: 2019-07-22 | End: 2019-07-22

## 2019-07-22 RX ORDER — NOREPINEPHRINE BITARTRATE/D5W 8 MG/250ML
0.05 PLASTIC BAG, INJECTION (ML) INTRAVENOUS
Qty: 16 | Refills: 0 | Status: DISCONTINUED | OUTPATIENT
Start: 2019-07-22 | End: 2019-07-24

## 2019-07-22 RX ORDER — VANCOMYCIN HCL 1 G
1250 VIAL (EA) INTRAVENOUS EVERY 8 HOURS
Refills: 0 | Status: DISCONTINUED | OUTPATIENT
Start: 2019-07-22 | End: 2019-07-23

## 2019-07-22 RX ORDER — AZITHROMYCIN 500 MG/1
TABLET, FILM COATED ORAL
Refills: 0 | Status: COMPLETED | OUTPATIENT
Start: 2019-07-22 | End: 2019-07-26

## 2019-07-22 RX ORDER — AZITHROMYCIN 500 MG/1
500 TABLET, FILM COATED ORAL ONCE
Refills: 0 | Status: COMPLETED | OUTPATIENT
Start: 2019-07-22 | End: 2019-07-22

## 2019-07-22 RX ORDER — AZITHROMYCIN 500 MG/1
500 TABLET, FILM COATED ORAL EVERY 24 HOURS
Refills: 0 | Status: COMPLETED | OUTPATIENT
Start: 2019-07-23 | End: 2019-07-26

## 2019-07-22 RX ADMIN — CISATRACURIUM BESYLATE 18.85 MICROGRAM(S)/KG/MIN: 2 INJECTION INTRAVENOUS at 17:49

## 2019-07-22 RX ADMIN — ENOXAPARIN SODIUM 40 MILLIGRAM(S): 100 INJECTION SUBCUTANEOUS at 11:52

## 2019-07-22 RX ADMIN — Medication 100 MILLIGRAM(S): at 23:26

## 2019-07-22 RX ADMIN — Medication 100 MILLIGRAM(S): at 17:46

## 2019-07-22 RX ADMIN — MEROPENEM 100 MILLIGRAM(S): 1 INJECTION INTRAVENOUS at 13:59

## 2019-07-22 RX ADMIN — Medication 250 MILLIGRAM(S): at 14:32

## 2019-07-22 RX ADMIN — Medication 4.91 MICROGRAM(S)/KG/MIN: at 09:00

## 2019-07-22 RX ADMIN — Medication 153 MILLIGRAM(S): at 05:13

## 2019-07-22 RX ADMIN — Medication 3 MILLILITER(S): at 11:53

## 2019-07-22 RX ADMIN — PROPOFOL 31.41 MICROGRAM(S)/KG/MIN: 10 INJECTION, EMULSION INTRAVENOUS at 10:00

## 2019-07-22 RX ADMIN — Medication 1 PATCH: at 07:46

## 2019-07-22 RX ADMIN — Medication 153 MILLIGRAM(S): at 23:26

## 2019-07-22 RX ADMIN — MEROPENEM 100 MILLIGRAM(S): 1 INJECTION INTRAVENOUS at 05:13

## 2019-07-22 RX ADMIN — Medication 3 MILLILITER(S): at 00:22

## 2019-07-22 RX ADMIN — DEXMEDETOMIDINE HYDROCHLORIDE IN 0.9% SODIUM CHLORIDE 5.24 MICROGRAM(S)/KG/HR: 4 INJECTION INTRAVENOUS at 10:01

## 2019-07-22 RX ADMIN — PROPOFOL 31.41 MICROGRAM(S)/KG/MIN: 10 INJECTION, EMULSION INTRAVENOUS at 03:00

## 2019-07-22 RX ADMIN — Medication 1 PATCH: at 11:52

## 2019-07-22 RX ADMIN — Medication 400 MILLIGRAM(S): at 19:58

## 2019-07-22 RX ADMIN — DEXMEDETOMIDINE HYDROCHLORIDE IN 0.9% SODIUM CHLORIDE 5.24 MICROGRAM(S)/KG/HR: 4 INJECTION INTRAVENOUS at 21:29

## 2019-07-22 RX ADMIN — Medication 3 MILLILITER(S): at 05:06

## 2019-07-22 RX ADMIN — Medication 102 MILLIGRAM(S): at 05:13

## 2019-07-22 RX ADMIN — Medication 1 PATCH: at 19:25

## 2019-07-22 RX ADMIN — PROPOFOL 31.41 MICROGRAM(S)/KG/MIN: 10 INJECTION, EMULSION INTRAVENOUS at 06:36

## 2019-07-22 RX ADMIN — Medication 1: at 06:09

## 2019-07-22 RX ADMIN — DEXMEDETOMIDINE HYDROCHLORIDE IN 0.9% SODIUM CHLORIDE 5.24 MICROGRAM(S)/KG/HR: 4 INJECTION INTRAVENOUS at 06:09

## 2019-07-22 RX ADMIN — CISATRACURIUM BESYLATE 18.85 MICROGRAM(S)/KG/MIN: 2 INJECTION INTRAVENOUS at 06:36

## 2019-07-22 RX ADMIN — CHLORHEXIDINE GLUCONATE 15 MILLILITER(S): 213 SOLUTION TOPICAL at 17:48

## 2019-07-22 RX ADMIN — CISATRACURIUM BESYLATE 18.85 MICROGRAM(S)/KG/MIN: 2 INJECTION INTRAVENOUS at 03:25

## 2019-07-22 RX ADMIN — FENTANYL CITRATE 5.24 MICROGRAM(S)/KG/HR: 50 INJECTION INTRAVENOUS at 10:01

## 2019-07-22 RX ADMIN — Medication 100 MILLIGRAM(S): at 11:51

## 2019-07-22 RX ADMIN — Medication 1 PATCH: at 12:04

## 2019-07-22 RX ADMIN — Medication 1: at 23:25

## 2019-07-22 RX ADMIN — CHLORHEXIDINE GLUCONATE 15 MILLILITER(S): 213 SOLUTION TOPICAL at 05:13

## 2019-07-22 RX ADMIN — Medication 3 MILLILITER(S): at 17:31

## 2019-07-22 RX ADMIN — PROPOFOL 31.41 MICROGRAM(S)/KG/MIN: 10 INJECTION, EMULSION INTRAVENOUS at 19:32

## 2019-07-22 RX ADMIN — PROPOFOL 31.41 MICROGRAM(S)/KG/MIN: 10 INJECTION, EMULSION INTRAVENOUS at 13:01

## 2019-07-22 RX ADMIN — Medication 250 MILLIGRAM(S): at 05:12

## 2019-07-22 RX ADMIN — Medication 153 MILLIGRAM(S): at 11:16

## 2019-07-22 RX ADMIN — Medication 166.67 MILLIGRAM(S): at 21:32

## 2019-07-22 RX ADMIN — DEXMEDETOMIDINE HYDROCHLORIDE IN 0.9% SODIUM CHLORIDE 5.24 MICROGRAM(S)/KG/HR: 4 INJECTION INTRAVENOUS at 14:01

## 2019-07-22 RX ADMIN — Medication 153 MILLIGRAM(S): at 17:46

## 2019-07-22 RX ADMIN — Medication 100 MILLIGRAM(S): at 05:13

## 2019-07-22 RX ADMIN — DEXMEDETOMIDINE HYDROCHLORIDE IN 0.9% SODIUM CHLORIDE 5.24 MICROGRAM(S)/KG/HR: 4 INJECTION INTRAVENOUS at 17:48

## 2019-07-22 RX ADMIN — MEROPENEM 100 MILLIGRAM(S): 1 INJECTION INTRAVENOUS at 21:33

## 2019-07-22 RX ADMIN — PROPOFOL 31.41 MICROGRAM(S)/KG/MIN: 10 INJECTION, EMULSION INTRAVENOUS at 17:47

## 2019-07-22 RX ADMIN — AZITHROMYCIN 255 MILLIGRAM(S): 500 TABLET, FILM COATED ORAL at 13:55

## 2019-07-22 RX ADMIN — Medication 1: at 11:59

## 2019-07-22 RX ADMIN — Medication 102 MILLIGRAM(S): at 17:45

## 2019-07-22 RX ADMIN — Medication 1000 MILLIGRAM(S): at 20:50

## 2019-07-22 RX ADMIN — DEXMEDETOMIDINE HYDROCHLORIDE IN 0.9% SODIUM CHLORIDE 5.24 MICROGRAM(S)/KG/HR: 4 INJECTION INTRAVENOUS at 00:25

## 2019-07-22 RX ADMIN — PANTOPRAZOLE SODIUM 40 MILLIGRAM(S): 20 TABLET, DELAYED RELEASE ORAL at 11:51

## 2019-07-22 RX ADMIN — CHLORHEXIDINE GLUCONATE 1 APPLICATION(S): 213 SOLUTION TOPICAL at 06:09

## 2019-07-22 NOTE — CONSULT NOTE ADULT - SUBJECTIVE AND OBJECTIVE BOX
Infectious Diseases - Attending at Dr. Gonzalez    HPI:  33 years old male here c/o fever nausea and non-bloody diarrhea with diffused abd pain since yesterday. Pt denies headache, dizziness, blurred vision, light sensitivities, focal/distal weakness or numbness, neck/back pain, cough, sob, chest pain, vomiting, dysuria, hematuria or irregular bowel movements. Pt also denies recent traveling.   CXR showed RLL infiltrates, Abd CT revealed RLL PNA, he was started on IVF, IV antibiotics. (2019 18:39)      PAST MEDICAL & SURGICAL HISTORY:  No pertinent family history  No significant past surgical history      Allergies    No Known Allergies    Intolerances        FAMILY HISTORY:  FH: HTN (hypertension)      SOCIAL HISTORY:    REVIEW OF SYSTEMS:    Constitutional: No fever, weight loss or fatigue  Eyes: No eye pain, visual disturbances, or discharge  ENT:  No difficulty hearing, tinnitus, vertigo; No sinus or throat pain  Neck: No pain or stiffness  Respiratory: No cough, wheezing, chills or hemoptysis  Cardiovascular: No chest pain, palpitations, shortness of breath, dizziness or leg swelling  Gastrointestinal: No abdominal or epigastric pain. No nausea, vomiting or hematemesis; No diarrhea or constipation. No melena or hematochezia.  Genitourinary: No dysuria, frequency, hematuria or incontinence  Neurological: No headaches, memory loss, loss of strength, numbness or tremors  Skin: No itching, burning, rashes or lesions       MEDICATIONS  (STANDING):  ALBUTerol/ipratropium for Nebulization 3 milliLiter(s) Nebulizer every 6 hours  ascorbic acid IVPB 1500 milliGRAM(s) IV Intermittent <User Schedule>  azithromycin  IVPB      chlorhexidine 0.12% Liquid 15 milliLiter(s) Oral Mucosa two times a day  chlorhexidine 4% Liquid 1 Application(s) Topical <User Schedule>  cisatracurium Infusion 3 MICROgram(s)/kG/Min (18.846 mL/Hr) IV Continuous <Continuous>  dexmedetomidine Infusion 0.2 MICROgram(s)/kG/Hr (5.235 mL/Hr) IV Continuous <Continuous>  dextrose 5%. 1000 milliLiter(s) (50 mL/Hr) IV Continuous <Continuous>  dextrose 50% Injectable 12.5 Gram(s) IV Push once  dextrose 50% Injectable 25 Gram(s) IV Push once  dextrose 50% Injectable 25 Gram(s) IV Push once  enoxaparin Injectable 40 milliGRAM(s) SubCutaneous daily  fentaNYL   Infusion. 0.5 MICROgram(s)/kG/Hr (5.235 mL/Hr) IV Continuous <Continuous>  hydrocortisone sodium succinate Injectable 100 milliGRAM(s) IV Push every 6 hours  insulin lispro (HumaLOG) corrective regimen sliding scale   SubCutaneous every 6 hours  levoFLOXacin IVPB 750 milliGRAM(s) IV Intermittent every 24 hours  meropenem  IVPB 500 milliGRAM(s) IV Intermittent every 8 hours  nicotine -  14 mG/24Hr(s) Patch 1 patch Transdermal daily  norepinephrine Infusion 0.05 MICROgram(s)/kG/Min (4.908 mL/Hr) IV Continuous <Continuous>  pantoprazole  Injectable 40 milliGRAM(s) IV Push daily  propofol Infusion 50 MICROgram(s)/kG/Min (31.41 mL/Hr) IV Continuous <Continuous>  thiamine IVPB 200 milliGRAM(s) IV Intermittent <User Schedule>  vancomycin  IVPB 1000 milliGRAM(s) IV Intermittent every 8 hours    MEDICATIONS  (PRN):  acetaminophen   Tablet .. 650 milliGRAM(s) Oral every 6 hours PRN Temp greater or equal to 38C (100.4F), Mild Pain (1 - 3)  dextrose 40% Gel 15 Gram(s) Oral once PRN Blood Glucose LESS THAN 70 milliGRAM(s)/deciliter  glucagon  Injectable 1 milliGRAM(s) IntraMuscular once PRN Glucose LESS THAN 70 milligrams/deciliter  guaiFENesin    Syrup 200 milliGRAM(s) Oral every 6 hours PRN Cough      Vital Signs Last 24 Hrs  T(C): 38.1 (2019 15:14), Max: 38.3 (2019 17:39)  T(F): 100.6 (2019 15:14), Max: 100.9 (2019 17:39)  HR: 81 (2019 15:03) (71 - 170)  BP: 100/69 (2019 23:05) (76/48 - 156/89)  BP(mean): 79 (2019 23:05) (58 - 119)  RR: 26 (2019 15:00) (0 - 32)  SpO2: 97% (2019 15:03) (94% - 100%)    PHYSICAL EXAM:    Constitutional: NAD, well-groomed, well-developed  HEENT: PERRLA, EOMI, Normal Hearing, MMM  Neck: No LAD, No JVD  Back: Normal spine flexure, No CVA tenderness  Respiratory: CTAB/L  Cardiovascular: S1 and S2, RRR, no M/G/R  Gastrointestinal: BS+, soft, NT/ND  Extremities: No peripheral edema  Vascular: 2+ peripheral pulses  Neurological: A/O x 3, no focal deficits  Skin: No rashes      LABS:                        12.0   11.81 )-----------( 148      ( 2019 03:21 )             34.4         137  |  105  |  10  ----------------------------<  154<H>  3.9   |  23  |  1.08    Ca    8.0<L>      2019 03:21  Phos  2.6       Mg     2.5         TPro  6.9  /  Alb  2.5<L>  /  TBili  0.6  /  DBili  x   /  AST  102<H>  /  ALT  43  /  AlkPhos  61      PT/INR - ( 2019 03:21 )   PT: 13.4 sec;   INR: 1.19 ratio         PTT - ( 2019 03:21 )  PTT:26.4 sec  Urinalysis Basic - ( 2019 10:33 )    Color: Gisselle / Appearance: Slightly Turbid / S.015 / pH: x  Gluc: x / Ketone: Trace  / Bili: Negative / Urobili: Negative mg/dL   Blood: x / Protein: 100 mg/dL / Nitrite: Negative   Leuk Esterase: Trace / RBC: 3-5 /HPF / WBC 3-5   Sq Epi: x / Non Sq Epi: x / Bacteria: Occasional        MICROBIOLOGY:  RECENT CULTURES:   Bronch Wash XXXX   Few polymorphonuclear leukocytes per low power field  No squamous epithelial cells per low power field  No organisms seen   Testing in progress     .Stool XXXX XXXX   No enteric pathogens to date: Final culture pending     .Blood XXXX XXXX   No growth to date.          RADIOLOGY & ADDITIONAL STUDIES: Infectious Diseases - Attending at Dr. Gonzalez    HPI:  33 years old male here c/o fever nausea and non-bloody diarrhea with diffused abd pain since yesterday. Pt denies headache, dizziness, blurred vision, light sensitivities, focal/distal weakness or numbness, neck/back pain, cough, sob, chest pain, vomiting, dysuria, hematuria or irregular bowel movements. Pt also denies recent traveling.   CXR showed RLL infiltrates, Abd CT revealed RLL PNA, he was started on IVF, IV antibiotics. (2019 18:39)  Pt is in septic shock with ARDS .  19: With increasing Respiratory distress, temperature up to 104 with chills. RRT was called, got started on BIPAP support with some improvement.   19:  Transferred to ICU due to worsening Respiratory status, intubated ,found to be in ARDS. on presors for septic shock  19 : Seen at bedside today ,pt sedated ,lying in prone position ,family at bedside ,Legionella urine antigen came positive  Pt currently on meropenem, vanco and levaquin    PAST MEDICAL & SURGICAL HISTORY:  No pertinent family history  No significant past surgical history      Allergies    No Known Allergies    Intolerances        FAMILY HISTORY:  FH: HTN (hypertension)      SOCIAL HISTORY: smoker (I PPD) ,smokes marijuana and alcohol socially, drives taxi(recently started)    REVIEW OF SYSTEMS:    non verbal as intubated    MEDICATIONS  (STANDING):  ALBUTerol/ipratropium for Nebulization 3 milliLiter(s) Nebulizer every 6 hours  ascorbic acid IVPB 1500 milliGRAM(s) IV Intermittent <User Schedule>  azithromycin  IVPB      chlorhexidine 0.12% Liquid 15 milliLiter(s) Oral Mucosa two times a day  chlorhexidine 4% Liquid 1 Application(s) Topical <User Schedule>  cisatracurium Infusion 3 MICROgram(s)/kG/Min (18.846 mL/Hr) IV Continuous <Continuous>  dexmedetomidine Infusion 0.2 MICROgram(s)/kG/Hr (5.235 mL/Hr) IV Continuous <Continuous>  dextrose 5%. 1000 milliLiter(s) (50 mL/Hr) IV Continuous <Continuous>  dextrose 50% Injectable 12.5 Gram(s) IV Push once  dextrose 50% Injectable 25 Gram(s) IV Push once  dextrose 50% Injectable 25 Gram(s) IV Push once  enoxaparin Injectable 40 milliGRAM(s) SubCutaneous daily  fentaNYL   Infusion. 0.5 MICROgram(s)/kG/Hr (5.235 mL/Hr) IV Continuous <Continuous>  hydrocortisone sodium succinate Injectable 100 milliGRAM(s) IV Push every 6 hours  insulin lispro (HumaLOG) corrective regimen sliding scale   SubCutaneous every 6 hours  levoFLOXacin IVPB 750 milliGRAM(s) IV Intermittent every 24 hours  meropenem  IVPB 500 milliGRAM(s) IV Intermittent every 8 hours  nicotine -  14 mG/24Hr(s) Patch 1 patch Transdermal daily  norepinephrine Infusion 0.05 MICROgram(s)/kG/Min (4.908 mL/Hr) IV Continuous <Continuous>  pantoprazole  Injectable 40 milliGRAM(s) IV Push daily  propofol Infusion 50 MICROgram(s)/kG/Min (31.41 mL/Hr) IV Continuous <Continuous>  thiamine IVPB 200 milliGRAM(s) IV Intermittent <User Schedule>  vancomycin  IVPB 1000 milliGRAM(s) IV Intermittent every 8 hours    MEDICATIONS  (PRN):  acetaminophen   Tablet .. 650 milliGRAM(s) Oral every 6 hours PRN Temp greater or equal to 38C (100.4F), Mild Pain (1 - 3)  dextrose 40% Gel 15 Gram(s) Oral once PRN Blood Glucose LESS THAN 70 milliGRAM(s)/deciliter  glucagon  Injectable 1 milliGRAM(s) IntraMuscular once PRN Glucose LESS THAN 70 milligrams/deciliter  guaiFENesin    Syrup 200 milliGRAM(s) Oral every 6 hours PRN Cough      Vital Signs Last 24 Hrs  T(C): 38.1 (2019 15:14), Max: 38.3 (2019 17:39)  T(F): 100.6 (2019 15:14), Max: 100.9 (2019 17:39)  HR: 81 (2019 15:03) (71 - 170)  BP: 100/69 (2019 23:05) (76/48 - 156/89)  BP(mean): 79 (2019 23:05) (58 - 119)  RR: 26 (2019 15:00) (0 - 32)  SpO2: 97% (2019 15:03) (94% - 100%)    PHYSICAL EXAM:    Constitutional: NAD, well-developed  HEENT: PERRL,pt in prone position limiting the exam  Neck: supple  Back: Normal spine flexure, No CVA tenderness  Respiratory: CTAB/L,on vent  Cardiovascular: S1 and S2, RRR, no M/G/R  Gastrointestinal: BS+, soft, NT/ND  Extremities: No peripheral edema  Vascular: 2+ peripheral pulses  Neurological: cant be examined ,sedated  Skin: No rashes  Lines :peripheral line+  Left IJ TLC+  moe +      LABS:                        12.0   11.81 )-----------( 148      ( 2019 03:21 )             34.4         137  |  105  |  10  ----------------------------<  154<H>  3.9   |  23  |  1.08    Ca    8.0<L>      2019 03:21  Phos  2.6       Mg     2.5         TPro  6.9  /  Alb  2.5<L>  /  TBili  0.6  /  DBili  x   /  AST  102<H>  /  ALT  43  /  AlkPhos  61      PT/INR - ( 2019 03:21 )   PT: 13.4 sec;   INR: 1.19 ratio         PTT - ( 2019 03:21 )  PTT:26.4 sec  Urinalysis Basic - ( 2019 10:33 )    Color: Gisselle / Appearance: Slightly Turbid / S.015 / pH: x  Gluc: x / Ketone: Trace  / Bili: Negative / Urobili: Negative mg/dL   Blood: x / Protein: 100 mg/dL / Nitrite: Negative   Leuk Esterase: Trace / RBC: 3-5 /HPF / WBC 3-5   Sq Epi: x / Non Sq Epi: x / Bacteria: Occasional        MICROBIOLOGY:  RECENT CULTURES:   Bronch Wash XXXX   Few polymorphonuclear leukocytes per low power field  No squamous epithelial cells per low power field  No organisms seen   Testing in progress     .Stool XXXX XXXX   No enteric pathogens to date: Final culture pending     .Blood XXXX XXXX   No growth to date.    Legionella pneumophila Antigen, Urine (19 @ 13:47)    Legionella Antigen, Urine: Positive              RADIOLOGY & ADDITIONAL STUDIES:

## 2019-07-22 NOTE — CONSULT NOTE ADULT - ASSESSMENT
Pt with legionella pneumonia (urine antigen positive ) but with septic shock and  ARDS in ICU on pressors  cont Levaquin   add azithro for double coverage  cont vanco meropenem for now while awaiting bronch results  legionella c/s ordered   Per core lab Legionella PCR not done on sputum at this time in our lab Pt is a 33 yr old otherwise healthy male who was admitted with pneumonia ,found to be with legionella pneumonia (urine antigen positive ) but with septic shock with acute respiratory failure and  ARDS in ICU on pressors  cont Levaquin   fever resolving   mild leukocytosis  all blood c/s neg  add azithro for double coverage  cont vanco meropenem for now while awaiting bronch results, bronch done on 7/21  legionella c/s ordered   Per core lab Legionella PCR not done on sputum at this time in our lab

## 2019-07-22 NOTE — PROGRESS NOTE ADULT - SUBJECTIVE AND OBJECTIVE BOX
Patient is a 33y old  Male who presents with a chief complaint of pneumonia (2019 15:40)      HPI:  33 years old male here c/o fever nausea and non-bloody diarrhea with diffused abd pain since yesterday. Pt denies headache, dizziness, blurred vision, light sensitivities, focal/distal weakness or numbness, neck/back pain, cough, sob, chest pain, vomiting, dysuria, hematuria or irregular bowel movements. Pt also denies recent traveling.   CXR showed RLL infiltrates, Abd CT revealed RLL PNA, he was started on IVF, IV antibiotics. (2019 18:39)      INTERVAL HPI/OVERNIGHT EVENTS: CCU #4  Chart reviewed S/P Intubation. Patient can not give any history. Diarrhea resolved    MEDICATIONS  (STANDING):  ALBUTerol/ipratropium for Nebulization 3 milliLiter(s) Nebulizer every 6 hours  ascorbic acid IVPB 1500 milliGRAM(s) IV Intermittent <User Schedule>  azithromycin  IVPB      chlorhexidine 0.12% Liquid 15 milliLiter(s) Oral Mucosa two times a day  chlorhexidine 4% Liquid 1 Application(s) Topical <User Schedule>  cisatracurium Infusion 3 MICROgram(s)/kG/Min (18.846 mL/Hr) IV Continuous <Continuous>  dexmedetomidine Infusion 0.2 MICROgram(s)/kG/Hr (5.235 mL/Hr) IV Continuous <Continuous>  dextrose 5%. 1000 milliLiter(s) (50 mL/Hr) IV Continuous <Continuous>  dextrose 50% Injectable 12.5 Gram(s) IV Push once  dextrose 50% Injectable 25 Gram(s) IV Push once  dextrose 50% Injectable 25 Gram(s) IV Push once  enoxaparin Injectable 40 milliGRAM(s) SubCutaneous daily  fentaNYL   Infusion. 0.5 MICROgram(s)/kG/Hr (5.235 mL/Hr) IV Continuous <Continuous>  hydrocortisone sodium succinate Injectable 100 milliGRAM(s) IV Push every 6 hours  insulin lispro (HumaLOG) corrective regimen sliding scale   SubCutaneous every 6 hours  levoFLOXacin IVPB 750 milliGRAM(s) IV Intermittent every 24 hours  meropenem  IVPB 500 milliGRAM(s) IV Intermittent every 8 hours  nicotine -  14 mG/24Hr(s) Patch 1 patch Transdermal daily  norepinephrine Infusion 0.05 MICROgram(s)/kG/Min (4.908 mL/Hr) IV Continuous <Continuous>  pantoprazole  Injectable 40 milliGRAM(s) IV Push daily  propofol Infusion 50 MICROgram(s)/kG/Min (31.41 mL/Hr) IV Continuous <Continuous>  thiamine IVPB 200 milliGRAM(s) IV Intermittent <User Schedule>  vancomycin  IVPB 1000 milliGRAM(s) IV Intermittent every 8 hours    MEDICATIONS  (PRN):  acetaminophen   Tablet .. 650 milliGRAM(s) Oral every 6 hours PRN Temp greater or equal to 38C (100.4F), Mild Pain (1 - 3)  dextrose 40% Gel 15 Gram(s) Oral once PRN Blood Glucose LESS THAN 70 milliGRAM(s)/deciliter  glucagon  Injectable 1 milliGRAM(s) IntraMuscular once PRN Glucose LESS THAN 70 milligrams/deciliter  guaiFENesin    Syrup 200 milliGRAM(s) Oral every 6 hours PRN Cough      FAMILY HISTORY:  FH: HTN (hypertension)      Allergies    No Known Allergies    Intolerances        PMH/PSH:  No pertinent family history  No significant past surgical history        REVIEW OF SYSTEMS:  CONSTITUTIONAL: No weight loss,  fever (+)   EYES: No eye pain, visual disturbances, or discharge  ENMT:  No difficulty hearing, tinnitus, vertigo; No sinus or throat pain  NECK: No pain or stiffness  BREASTS: No pain, masses, or nipple discharge  RESPIRATORY: No cough, wheezing, chills or hemoptysis; No shortness of breath  CARDIOVASCULAR: No chest pain, palpitations, dizziness, or leg swelling  GASTROINTESTINAL: See above.  GENITOURINARY: No dysuria, frequency, hematuria, or incontinence  NEUROLOGICAL: No headaches, memory loss, loss of strength, numbness, or tremors  SKIN: No itching, burning, rashes, or lesions   LYMPH NODES: No enlarged glands  ENDOCRINE: No heat or cold intolerance; No hair loss  MUSCULOSKELETAL: No joint pain or swelling; No muscle, back, or extremity pain  PSYCHIATRIC: No depression, anxiety, mood swings, or difficulty sleeping  HEME/LYMPH: No easy bruising, or bleeding gums  ALLERGY AND IMMUNOLOGIC: No hives or eczema    Vital Signs Last 24 Hrs  T(C): 38.1 (2019 15:14), Max: 38.3 (2019 17:39)  T(F): 100.6 (2019 15:14), Max: 100.9 (2019 17:39)  HR: 81 (2019 17:15) (71 - 170)  BP: 100/69 (2019 23:05) (76/48 - 156/89)  BP(mean): 79 (2019 23:05) (58 - 107)  RR: 26 (2019 17:15) (0 - 28)  SpO2: 98% (2019 17:15) (94% - 100%)    PHYSICAL EXAM:  Intubated  GENERAL: NAD, well-groomed, well-developed  HEAD:  Atraumatic, Normocephalic  EYES: EOMI, PERRLA, conjunctiva and sclera clear  NECK: Supple, No JVD, Normal thyroid  NERVOUS SYSTEM:  Intubated  CHEST/LUNG: Clear to percussion bilaterally; No rales, rhonchi, wheezing, or rubs  HEART: Regular rate and rhythm; No murmurs, rubs, or gallops  ABDOMEN: Soft, Nontender, Nondistended; Bowel sounds present  EXTREMITIES:  2+ Peripheral Pulses, No clubbing, cyanosis, or edema  LYMPH: No lymphadenopathy noted  SKIN: No rashes or lesions    LAB                          12.0   11.81 )-----------( 148      ( 2019 03:21 )             34.4       CBC:   @ 03:21  WBC 11.81   Hgb 12.0   Hct 34.4   Plts 148  MCV 85.4   @ 05:49  WBC 8.93   Hgb 12.7   Hct 37.3   Plts 102  MCV 85.0   @ 07:11  WBC 11.00   Hgb 13.1   Hct 39.8   Plts 115  MCV 87.3   @ 16:37  WBC 12.25   Hgb 14.6   Hct 44.4   Plts 142  MCV 87.6      Chemistry:   @ 03:21  Na+ 137  K+ 3.9  Cl- 105  CO2 23  BUN 10  Cr 1.08      @ 05:49  Na+ 133  K+ 3.0  Cl- 104  CO2 22  BUN 5  Cr 0.95      @ 07:11  Na+ 136  K+ 3.4  Cl- 104  CO2 20  BUN 6  Cr 1.10     07-19 @ 16:37  Na+ 131  K+ 3.3  Cl- 98  CO2 24  BUN 10  Cr 1.54         Glucose, Serum: 154 mg/dL ( @ 03:21)  Glucose, Serum: 121 mg/dL ( @ 05:49)  Glucose, Serum: 110 mg/dL ( @ 07:11)  Glucose, Serum: 155 mg/dL ( @ 16:37)      2019 03:21    137    |  105    |  10     ----------------------------<  154    3.9     |  23     |  1.08   2019 05:49    133    |  104    |  5      ----------------------------<  121    3.0     |  22     |  0.95   2019 07:11    136    |  104    |  6      ----------------------------<  110    3.4     |  20     |  1.10   2019 16:37    131    |  98     |  10     ----------------------------<  155    3.3     |  24     |  1.54     Ca    8.0        2019 03:21  Ca    7.9        2019 05:49  Ca    7.3        2019 07:11  Ca    8.8        2019 16:37  Phos  2.6       2019 03:21  Phos  1.3       2019 05:49  Mg     2.5       2019 03:21  Mg     2.0       2019 05:49    TPro  6.9    /  Alb  2.5    /  TBili  0.6    /  DBili  x      /  AST  102    /  ALT  43     /  AlkPhos  61     2019 05:49  TPro  6.8    /  Alb  2.8    /  TBili  0.8    /  DBili  x      /  AST  60     /  ALT  43     /  AlkPhos  63     2019 07:11  TPro  8.4    /  Alb  3.4    /  TBili  0.7    /  DBili  x      /  AST  26     /  ALT  43     /  AlkPhos  76     2019 16:37      PT/INR - ( 2019 03:21 )   PT: 13.4 sec;   INR: 1.19 ratio         PTT - ( 2019 03:21 )  PTT:26.4 sec    Urinalysis Basic - ( 2019 10:33 )    Color: Gisselle / Appearance: Slightly Turbid / S.015 / pH: x  Gluc: x / Ketone: Trace  / Bili: Negative / Urobili: Negative mg/dL   Blood: x / Protein: 100 mg/dL / Nitrite: Negative   Leuk Esterase: Trace / RBC: 3-5 /HPF / WBC 3-5   Sq Epi: x / Non Sq Epi: x / Bacteria: Occasional        CAPILLARY BLOOD GLUCOSE      POCT Blood Glucose.: 162 mg/dL (2019 11:44)  POCT Blood Glucose.: 198 mg/dL (2019 06:04)  POCT Blood Glucose.: 154 mg/dL (2019 23:26)  POCT Blood Glucose.: 164 mg/dL (2019 18:18)          RADIOLOGY & ADDITIONAL TESTS:    Imaging Personally Reviewed:  [ ] YES  [ ] NO    Consultant(s) Notes Reviewed:  [ ] YES  [ ] NO    Care Discussed with Consultants/Other Providers [ ] YES  [ ] NO

## 2019-07-22 NOTE — PROGRESS NOTE ADULT - ASSESSMENT
32 yo M p/w PNA, ARDS and currently intubated and in prone positioning. Now + for legionella Ag    Neuro  cont sedation with propofol and fentanyl and precedex  cont nimbex gtt for ventilator dyssynchrony- monitor TOF      Pulm  ARDS from legionella PNA w/ low P/F ratio overnight and was proned and paralyzed  plateau pressure 32   cont low tidal volume ventilation  cont decrease fio2 and cont peep at 15  cont abx w/ broad spectrum and double coverage for legionella  f/u BAL cx and cell count  f/u other autoimmune w/u and fungal w/u   HIV negative and f/u cd4    CVS  on pressors from sepsis and sedation related reason  cont levophed  cont Vit C protocol for septic shock     Renal  good UO   cont to monitor UO  hold fluids for now as pt becoming significantly fluid positive         Critical Care Time 60min 34 yo M p/w PNA, ARDS and currently intubated and in prone positioning. Now + for legionella Ag    Neuro  cont sedation with propofol and fentanyl and precedex  cont nimbex gtt for ventilator dyssynchrony- monitor TOF      Pulm  ARDS from legionella PNA w/ low P/F ratio overnight and was proned and paralyzed  plateau pressure 32   cont low tidal volume ventilation  cont decrease fio2 and cont peep at 15  cont abx w/ broad spectrum and double coverage for legionella  f/u BAL cx and cell count  f/u other autoimmune w/u and fungal w/u   HIV negative and f/u cd4    CVS  on pressors from sepsis and sedation related reason  cont levophed  cont Vit C protocol for septic shock     Renal  good UO   cont to monitor UO  hold fluids for now as pt becoming significantly fluid positive     Discussion had with family today and they are aware of his critical condition and all questions answered    Critical Care Time 60min

## 2019-07-22 NOTE — CHART NOTE - NSCHARTNOTEFT_GEN_A_CORE
Pt's father, Walter Reyes called for an update on pt. ID confirmed with address, phone number. Donato updated at length on patient condition, made aware that the patient is critically ill. Questions answered to the best of my ability. Donato made aware that visitation is allowed 24/7 and invited to bedside.

## 2019-07-22 NOTE — PROGRESS NOTE ADULT - SUBJECTIVE AND OBJECTIVE BOX
INTERVAL HPI:    33 years old male with no significant medical history here c/o fever nausea and non-bloody diarrhea with diffused abd pain for one day. Started about 4 days prior to that with right lower chest, back and knee pain along with profuse diarrhea. Also with diffuse body ache, sweating and chills. Then started with sob.  Admitted with RLL pneumonia.  19: With increasing Respiratory distress, temperature up to 104 with chills. RRT was called, got started on BIPAP support with some improvement.   Active smoker 1 ppd. uses alcohol and Marijuana socially.  19:  Transferred to ICU due to worsening Respiratory status.  Got intubated, on vent with high FIO2 and peep.  19: Legionella urine antigen positive.    OVERNIGHT EVENTS:  On Vent, sedated, requiring pressor support. In prone ventilation.    Vital Signs Last 24 Hrs  T(C): 38.8 (2019 19:45), Max: 38.8 (2019 19:45)  T(F): 101.8 (2019 19:45), Max: 101.8 (2019 19:45)  HR: 90 (2019 20:00) (71 - 93)  BP: 106/71 (2019 19:00) (95/62 - 109/77)  BP(mean): 81 (2019 19:00) (72 - 88)  RR: 26 (2019 20:00) (0 - 30)  SpO2: 97% (2019 20:00) (95% - 100%)    Mode: AC/ CMV (Assist Control/ Continuous Mandatory Ventilation)  RR (machine): 26  TV (machine): 480  FiO2: 50  PEEP: 15  ITime: 0.9  MAP: 23  PIP: 38    PHYSICAL EXAM:  GEN:        sedated, comfortable.   HEENT:    ett    RESP:        no distress  CVS:          Regular rate and rhythm.   ABD:         Soft, non-tender, non-distended;     MEDICATIONS  (STANDING):  ALBUTerol/ipratropium for Nebulization 3 milliLiter(s) Nebulizer every 6 hours  ascorbic acid IVPB 1500 milliGRAM(s) IV Intermittent <User Schedule>  azithromycin  IVPB      chlorhexidine 0.12% Liquid 15 milliLiter(s) Oral Mucosa two times a day  chlorhexidine 4% Liquid 1 Application(s) Topical <User Schedule>  cisatracurium Infusion 3 MICROgram(s)/kG/Min (18.846 mL/Hr) IV Continuous <Continuous>  dexmedetomidine Infusion 0.2 MICROgram(s)/kG/Hr (5.235 mL/Hr) IV Continuous <Continuous>  dextrose 5%. 1000 milliLiter(s) (50 mL/Hr) IV Continuous <Continuous>  dextrose 50% Injectable 12.5 Gram(s) IV Push once  dextrose 50% Injectable 25 Gram(s) IV Push once  dextrose 50% Injectable 25 Gram(s) IV Push once  enoxaparin Injectable 40 milliGRAM(s) SubCutaneous daily  fentaNYL   Infusion. 0.5 MICROgram(s)/kG/Hr (5.235 mL/Hr) IV Continuous <Continuous>  hydrocortisone sodium succinate Injectable 100 milliGRAM(s) IV Push every 6 hours  insulin lispro (HumaLOG) corrective regimen sliding scale   SubCutaneous every 6 hours  levoFLOXacin IVPB 750 milliGRAM(s) IV Intermittent every 24 hours  meropenem  IVPB 500 milliGRAM(s) IV Intermittent every 8 hours  nicotine -  14 mG/24Hr(s) Patch 1 patch Transdermal daily  norepinephrine Infusion 0.05 MICROgram(s)/kG/Min (4.908 mL/Hr) IV Continuous <Continuous>  pantoprazole  Injectable 40 milliGRAM(s) IV Push daily  propofol Infusion 50 MICROgram(s)/kG/Min (31.41 mL/Hr) IV Continuous <Continuous>  thiamine IVPB 200 milliGRAM(s) IV Intermittent <User Schedule>  vancomycin  IVPB 1250 milliGRAM(s) IV Intermittent every 8 hours    MEDICATIONS  (PRN):  acetaminophen   Tablet .. 650 milliGRAM(s) Oral every 6 hours PRN Temp greater or equal to 38C (100.4F), Mild Pain (1 - 3)  dextrose 40% Gel 15 Gram(s) Oral once PRN Blood Glucose LESS THAN 70 milliGRAM(s)/deciliter  glucagon  Injectable 1 milliGRAM(s) IntraMuscular once PRN Glucose LESS THAN 70 milligrams/deciliter  guaiFENesin    Syrup 200 milliGRAM(s) Oral every 6 hours PRN Cough    LABS:                        12.0   11.81 )-----------( 148      ( 2019 03:21 )             34.4     07-22    137  |  105  |  10  ----------------------------<  154<H>  3.9   |  23  |  1.08    Ca    8.0<L>      2019 03:21  Phos  2.6       Mg     2.5         TPro  6.9  /  Alb  2.5<L>  /  TBili  0.6  /  DBili  x   /  AST  102<H>  /  ALT  43  /  AlkPhos  61      PT/INR - ( 2019 03:21 )   PT: 13.4 sec;   INR: 1.19 ratio      PTT - ( 2019 03:21 )  PTT:26.4 sec   @ 20:04  pH: 7.41  pCO2: 39  pO2: 78  SaO2: 96   @ 09:05  pH: 7.38  pCO2: 39  pO2: 95  SaO2: 98   @ 02:20  pH: 7.37  pCO2: 33  pO2: 116  SaO2: 99   @ 21:32  pH: 7.37  pCO2: 39  pO2: 86  SaO2: 97   @ 21:16  pH: 7.36  pCO2: 41  pO2: 62  SaO2: 92   @ 17:30  pH: 7.29  pCO2: 49  pO2: 102  SaO2: 97   @ 06:17  pH: 7.46  pCO2: 31  pO2: 82  SaO2: 97   @ 15:06  pH: 7.49  pCO2: 25  pO2: 70  SaO2: 96    Urinalysis Basic - ( 2019 10:33 )    Color: Gisselle / Appearance: Slightly Turbid / S.015 / pH: x  Gluc: x / Ketone: Trace  / Bili: Negative / Urobili: Negative mg/dL   Blood: x / Protein: 100 mg/dL / Nitrite: Negative   Leuk Esterase: Trace / RBC: 3-5 /HPF / WBC 3-5   Sq Epi: x / Non Sq Epi: x / Bacteria: Occasional    RADIOLOGY & ADDITIONAL STUDIES:  < from: Xray Chest 1 View- PORTABLE-Routine (19 @ 10:00) >    EXAM:  XR CHEST PORTABLE ROUTINE 1V                          PROCEDURE DATE:  2019      INTERPRETATION:  AP chest on 2018 at 8:23 AM. Patient has ARDS.    Heart suggest normal size.    Endotracheal tube, nasogastric tube, left jugular line remain.    Diffuse advanced infiltrates are again noted. This is increased on the   left compared to .    IMPRESSION: Diffuse advanced infiltrates, increased on the left.    CASSANDRA IBARRA M.D., ATTENDING RADIOLOGIST  Thisdocument has been electronically signed. 2019 10:10AM      ASSESSMENT AND PLAN:  ·	Acute Hypoxic Respiratory failure.  ·	Legionella Pneumonia.  ·	ARDS.  ·	Sepsis.  ·	Diarrhea.  ·	Hypokalemia better.  ·	Tobacco abuse.  ·	Obesity.      Started on dual legionella coverage.  Continue Vent, pressor support.  In prone position ventilation.  On stress dose steroids.  DVT/GI prophylaxis.

## 2019-07-22 NOTE — CONSULT NOTE ADULT - PROBLEM SELECTOR RECOMMENDATION 9
pt was on azithro from 7/19-7/20 and swithced to levaquin on 7/21  Legionella antigen positive  add azithro for double coverage  cont other empiric antibiotics while awaiting bronch c/s results

## 2019-07-22 NOTE — PROGRESS NOTE ADULT - SUBJECTIVE AND OBJECTIVE BOX
HPI:  33 years old male here c/o fever nausea and non-bloody diarrhea with diffused abd pain since yesterday. Pt denies headache, dizziness, blurred vision, light sensitivities, focal/distal weakness or numbness, neck/back pain, cough, sob, chest pain, vomiting, dysuria, hematuria or irregular bowel movements. Pt also denies recent traveling.   CXR showed RLL infiltrates, Abd CT revealed RLL PNA, he was started on IVF, IV antibiotics. (19 Jul 2019 18:39)      24 hr events:  Legionella urine antigen now positive  Other cultures so far negative  Pt remains sedated and paralyzed  Pt placed in prone positioning overnight    ## ROS:  [x ] unable to obtain as pt intubated/sedated      ## Labs:                        12.0   11.81 )-----------( 148      ( 22 Jul 2019 03:21 )             34.4   07-22    137  |  105  |  10  ----------------------------<  154<H>  3.9   |  23  |  1.08    Ca    8.0<L>      22 Jul 2019 03:21  Phos  2.6     07-22  Mg     2.5     07-22    TPro  6.9  /  Alb  2.5<L>  /  TBili  0.6  /  DBili  x   /  AST  102<H>  /  ALT  43  /  AlkPhos  61  07-21      MEDICATIONS  (STANDING):  ALBUTerol/ipratropium for Nebulization 3 milliLiter(s) Nebulizer every 6 hours  ascorbic acid IVPB 1500 milliGRAM(s) IV Intermittent <User Schedule>  azithromycin  IVPB      azithromycin  IVPB 500 milliGRAM(s) IV Intermittent once  chlorhexidine 0.12% Liquid 15 milliLiter(s) Oral Mucosa two times a day  chlorhexidine 4% Liquid 1 Application(s) Topical <User Schedule>  cisatracurium Infusion 3 MICROgram(s)/kG/Min (18.846 mL/Hr) IV Continuous <Continuous>  dexmedetomidine Infusion 0.2 MICROgram(s)/kG/Hr (5.235 mL/Hr) IV Continuous <Continuous>  dextrose 5%. 1000 milliLiter(s) (50 mL/Hr) IV Continuous <Continuous>  dextrose 50% Injectable 12.5 Gram(s) IV Push once  dextrose 50% Injectable 25 Gram(s) IV Push once  dextrose 50% Injectable 25 Gram(s) IV Push once  enoxaparin Injectable 40 milliGRAM(s) SubCutaneous daily  fentaNYL   Infusion. 0.5 MICROgram(s)/kG/Hr (5.235 mL/Hr) IV Continuous <Continuous>  hydrocortisone sodium succinate Injectable 100 milliGRAM(s) IV Push every 6 hours  insulin lispro (HumaLOG) corrective regimen sliding scale   SubCutaneous every 6 hours  levoFLOXacin IVPB 750 milliGRAM(s) IV Intermittent every 24 hours  meropenem  IVPB 500 milliGRAM(s) IV Intermittent every 8 hours  nicotine -  14 mG/24Hr(s) Patch 1 patch Transdermal daily  norepinephrine Infusion 0.05 MICROgram(s)/kG/Min (4.908 mL/Hr) IV Continuous <Continuous>  pantoprazole  Injectable 40 milliGRAM(s) IV Push daily  propofol Infusion 50 MICROgram(s)/kG/Min (31.41 mL/Hr) IV Continuous <Continuous>  thiamine IVPB 200 milliGRAM(s) IV Intermittent <User Schedule>  vancomycin  IVPB 1000 milliGRAM(s) IV Intermittent every 8 hours    ## Imaging:  bilateral airspace disease (coarse/hazy opacities). Small effusion. No PE    ICU Vital Signs Last 24 Hrs  T(C): 37.7 (22 Jul 2019 11:00), Max: 38.3 (21 Jul 2019 17:39)  T(F): 99.8 (22 Jul 2019 11:00), Max: 100.9 (21 Jul 2019 17:39)  HR: 80 (22 Jul 2019 13:45) (71 - 170)  BP: 100/69 (21 Jul 2019 23:05) (76/48 - 156/89)  BP(mean): 79 (21 Jul 2019 23:05) (58 - 119)  ABP: 115/69 (22 Jul 2019 13:45) (86/64 - 259/67)  ABP(mean): 83 (22 Jul 2019 13:45) (71 - 201)  RR: 26 (22 Jul 2019 13:45) (0 - 33)  SpO2: 99% (22 Jul 2019 13:45) (94% - 100%)      ## P/E:  Gen: intubated, prone positioning  HEENT: unable to assess currently due to prone positioning   Resp: b/l rhonchi  CVS: S1S2 no m/r/g  Abd: soft NT/ND +BS  Ext: no c/c/e  Neuro: sedated, intubated

## 2019-07-23 DIAGNOSIS — A09 INFECTIOUS GASTROENTERITIS AND COLITIS, UNSPECIFIED: ICD-10-CM

## 2019-07-23 DIAGNOSIS — J96.01 ACUTE RESPIRATORY FAILURE WITH HYPOXIA: ICD-10-CM

## 2019-07-23 DIAGNOSIS — A48.1 LEGIONNAIRES' DISEASE: ICD-10-CM

## 2019-07-23 DIAGNOSIS — J80 ACUTE RESPIRATORY DISTRESS SYNDROME: ICD-10-CM

## 2019-07-23 DIAGNOSIS — A41.9 SEPSIS, UNSPECIFIED ORGANISM: ICD-10-CM

## 2019-07-23 DIAGNOSIS — N17.9 ACUTE KIDNEY FAILURE, UNSPECIFIED: ICD-10-CM

## 2019-07-23 LAB
ALBUMIN SERPL ELPH-MCNC: 1.9 G/DL — LOW (ref 3.3–5)
ALP SERPL-CCNC: 53 U/L — SIGNIFICANT CHANGE UP (ref 40–120)
ALT FLD-CCNC: 32 U/L — SIGNIFICANT CHANGE UP (ref 12–78)
AMPHET UR-MCNC: NEGATIVE — SIGNIFICANT CHANGE UP
ANA TITR SER: NEGATIVE — SIGNIFICANT CHANGE UP
ANION GAP SERPL CALC-SCNC: 9 MMOL/L — SIGNIFICANT CHANGE UP (ref 5–17)
APTT BLD: 26.8 SEC — LOW (ref 28.5–37)
AST SERPL-CCNC: 32 U/L — SIGNIFICANT CHANGE UP (ref 15–37)
AUTO DIFF PNL BLD: NEGATIVE — SIGNIFICANT CHANGE UP
BARBITURATES UR SCN-MCNC: NEGATIVE — SIGNIFICANT CHANGE UP
BASE EXCESS BLDA CALC-SCNC: -0.3 MMOL/L — SIGNIFICANT CHANGE UP (ref -2–2)
BASE EXCESS BLDA CALC-SCNC: -0.4 MMOL/L — SIGNIFICANT CHANGE UP (ref -2–2)
BENZODIAZ UR-MCNC: POSITIVE — SIGNIFICANT CHANGE UP
BILIRUB DIRECT SERPL-MCNC: 0.36 MG/DL — HIGH (ref 0.05–0.2)
BILIRUB INDIRECT FLD-MCNC: 0.1 MG/DL — LOW (ref 0.2–1)
BILIRUB SERPL-MCNC: 0.5 MG/DL — SIGNIFICANT CHANGE UP (ref 0.2–1.2)
BLOOD GAS COMMENTS: SIGNIFICANT CHANGE UP
BLOOD GAS COMMENTS: SIGNIFICANT CHANGE UP
BLOOD GAS SOURCE: SIGNIFICANT CHANGE UP
BLOOD GAS SOURCE: SIGNIFICANT CHANGE UP
BUN SERPL-MCNC: 16 MG/DL — SIGNIFICANT CHANGE UP (ref 7–23)
C-ANCA SER-ACNC: NEGATIVE — SIGNIFICANT CHANGE UP
CALCIUM SERPL-MCNC: 7.9 MG/DL — LOW (ref 8.5–10.1)
CHLORIDE SERPL-SCNC: 103 MMOL/L — SIGNIFICANT CHANGE UP (ref 96–108)
CK MB BLD-MCNC: 0.3 % — SIGNIFICANT CHANGE UP (ref 0–3.5)
CK MB CFR SERPL CALC: 1.1 NG/ML — SIGNIFICANT CHANGE UP (ref 0.5–3.6)
CK SERPL-CCNC: 399 U/L — HIGH (ref 26–308)
CO2 SERPL-SCNC: 23 MMOL/L — SIGNIFICANT CHANGE UP (ref 22–31)
COCAINE METAB.OTHER UR-MCNC: NEGATIVE — SIGNIFICANT CHANGE UP
CREAT SERPL-MCNC: 1.44 MG/DL — HIGH (ref 0.5–1.3)
CRP SERPL-MCNC: 12.13 MG/DL — HIGH (ref 0–0.4)
CULTURE RESULTS: SIGNIFICANT CHANGE UP
FUNGITELL: <31 PG/ML — SIGNIFICANT CHANGE UP
GLUCOSE BLDC GLUCOMTR-MCNC: 129 MG/DL — HIGH (ref 70–99)
GLUCOSE BLDC GLUCOMTR-MCNC: 131 MG/DL — HIGH (ref 70–99)
GLUCOSE BLDC GLUCOMTR-MCNC: 146 MG/DL — HIGH (ref 70–99)
GLUCOSE BLDC GLUCOMTR-MCNC: 167 MG/DL — HIGH (ref 70–99)
GLUCOSE SERPL-MCNC: 140 MG/DL — HIGH (ref 70–99)
GRAM STN FLD: SIGNIFICANT CHANGE UP
HCO3 BLDA-SCNC: 23 MMOL/L — SIGNIFICANT CHANGE UP (ref 21–29)
HCO3 BLDA-SCNC: 24 MMOL/L — SIGNIFICANT CHANGE UP (ref 21–29)
HCT VFR BLD CALC: 30.4 % — LOW (ref 39–50)
HGB BLD-MCNC: 10.5 G/DL — LOW (ref 13–17)
HIV-1 VIRAL LOAD RESULT: SIGNIFICANT CHANGE UP
HIV1 RNA # SERPL NAA+PROBE: SIGNIFICANT CHANGE UP
HIV1 RNA SER-IMP: SIGNIFICANT CHANGE UP
HIV1 RNA SERPL NAA+PROBE-ACNC: SIGNIFICANT CHANGE UP
HIV1 RNA SERPL NAA+PROBE-LOG#: SIGNIFICANT CHANGE UP LG COP/ML
HOROWITZ INDEX BLDA+IHG-RTO: 50 — SIGNIFICANT CHANGE UP
HOROWITZ INDEX BLDA+IHG-RTO: 50 — SIGNIFICANT CHANGE UP
INR BLD: 1.19 RATIO — HIGH (ref 0.88–1.16)
MAGNESIUM SERPL-MCNC: 2.5 MG/DL — SIGNIFICANT CHANGE UP (ref 1.6–2.6)
MCHC RBC-ENTMCNC: 30.1 PG — SIGNIFICANT CHANGE UP (ref 27–34)
MCHC RBC-ENTMCNC: 34.5 GM/DL — SIGNIFICANT CHANGE UP (ref 32–36)
MCV RBC AUTO: 87.1 FL — SIGNIFICANT CHANGE UP (ref 80–100)
METHADONE UR-MCNC: NEGATIVE — SIGNIFICANT CHANGE UP
NRBC # BLD: 0 /100 WBCS — SIGNIFICANT CHANGE UP (ref 0–0)
OPIATES UR-MCNC: POSITIVE — SIGNIFICANT CHANGE UP
P-ANCA SER-ACNC: NEGATIVE — SIGNIFICANT CHANGE UP
PCO2 BLDA: 34 MMHG — SIGNIFICANT CHANGE UP (ref 32–46)
PCO2 BLDA: 43 MMHG — SIGNIFICANT CHANGE UP (ref 32–46)
PCP SPEC-MCNC: SIGNIFICANT CHANGE UP
PCP UR-MCNC: NEGATIVE — SIGNIFICANT CHANGE UP
PH BLD: 7.37 — SIGNIFICANT CHANGE UP (ref 7.35–7.45)
PH BLD: 7.45 — SIGNIFICANT CHANGE UP (ref 7.35–7.45)
PHOSPHATE SERPL-MCNC: 3 MG/DL — SIGNIFICANT CHANGE UP (ref 2.5–4.5)
PLATELET # BLD AUTO: 118 K/UL — LOW (ref 150–400)
PO2 BLDA: 111 MMHG — HIGH (ref 74–108)
PO2 BLDA: 115 MMHG — HIGH (ref 74–108)
POTASSIUM SERPL-MCNC: 4 MMOL/L — SIGNIFICANT CHANGE UP (ref 3.5–5.3)
POTASSIUM SERPL-SCNC: 4 MMOL/L — SIGNIFICANT CHANGE UP (ref 3.5–5.3)
PROT SERPL-MCNC: 6 GM/DL — SIGNIFICANT CHANGE UP (ref 6–8.3)
PROTHROM AB SERPL-ACNC: 13.4 SEC — HIGH (ref 10–12.9)
RBC # BLD: 3.49 M/UL — LOW (ref 4.2–5.8)
RBC # FLD: 14.6 % — HIGH (ref 10.3–14.5)
S PNEUM AG SER QL: SIGNIFICANT CHANGE UP
SAO2 % BLDA: 98 % — HIGH (ref 92–96)
SAO2 % BLDA: 99 % — HIGH (ref 92–96)
SODIUM SERPL-SCNC: 135 MMOL/L — SIGNIFICANT CHANGE UP (ref 135–145)
SPECIMEN SOURCE: SIGNIFICANT CHANGE UP
THC UR QL: POSITIVE — SIGNIFICANT CHANGE UP
TROPONIN I SERPL-MCNC: <.015 NG/ML — SIGNIFICANT CHANGE UP (ref 0.01–0.04)
VANCOMYCIN TROUGH SERPL-MCNC: 11.7 UG/ML — SIGNIFICANT CHANGE UP (ref 10–20)
WBC # BLD: 9.28 K/UL — SIGNIFICANT CHANGE UP (ref 3.8–10.5)
WBC # FLD AUTO: 9.28 K/UL — SIGNIFICANT CHANGE UP (ref 3.8–10.5)

## 2019-07-23 PROCEDURE — 93010 ELECTROCARDIOGRAM REPORT: CPT

## 2019-07-23 PROCEDURE — 71045 X-RAY EXAM CHEST 1 VIEW: CPT | Mod: 26

## 2019-07-23 PROCEDURE — 99291 CRITICAL CARE FIRST HOUR: CPT

## 2019-07-23 PROCEDURE — 99233 SBSQ HOSP IP/OBS HIGH 50: CPT

## 2019-07-23 RX ORDER — PANTOPRAZOLE SODIUM 20 MG/1
40 TABLET, DELAYED RELEASE ORAL DAILY
Refills: 0 | Status: DISCONTINUED | OUTPATIENT
Start: 2019-07-23 | End: 2019-07-27

## 2019-07-23 RX ORDER — MEROPENEM 1 G/30ML
1000 INJECTION INTRAVENOUS EVERY 8 HOURS
Refills: 0 | Status: DISCONTINUED | OUTPATIENT
Start: 2019-07-23 | End: 2019-07-25

## 2019-07-23 RX ADMIN — FENTANYL CITRATE 5.24 MICROGRAM(S)/KG/HR: 50 INJECTION INTRAVENOUS at 00:19

## 2019-07-23 RX ADMIN — DEXMEDETOMIDINE HYDROCHLORIDE IN 0.9% SODIUM CHLORIDE 5.24 MICROGRAM(S)/KG/HR: 4 INJECTION INTRAVENOUS at 12:50

## 2019-07-23 RX ADMIN — MEROPENEM 100 MILLIGRAM(S): 1 INJECTION INTRAVENOUS at 14:04

## 2019-07-23 RX ADMIN — Medication 3 MILLILITER(S): at 13:04

## 2019-07-23 RX ADMIN — Medication 100 MILLIGRAM(S): at 17:31

## 2019-07-23 RX ADMIN — Medication 100 MILLIGRAM(S): at 23:07

## 2019-07-23 RX ADMIN — Medication 153 MILLIGRAM(S): at 05:05

## 2019-07-23 RX ADMIN — CISATRACURIUM BESYLATE 18.85 MICROGRAM(S)/KG/MIN: 2 INJECTION INTRAVENOUS at 04:36

## 2019-07-23 RX ADMIN — Medication 1 PATCH: at 11:56

## 2019-07-23 RX ADMIN — CISATRACURIUM BESYLATE 18.85 MICROGRAM(S)/KG/MIN: 2 INJECTION INTRAVENOUS at 16:00

## 2019-07-23 RX ADMIN — PROPOFOL 31.41 MICROGRAM(S)/KG/MIN: 10 INJECTION, EMULSION INTRAVENOUS at 18:40

## 2019-07-23 RX ADMIN — FENTANYL CITRATE 5.24 MICROGRAM(S)/KG/HR: 50 INJECTION INTRAVENOUS at 14:04

## 2019-07-23 RX ADMIN — Medication 1: at 05:34

## 2019-07-23 RX ADMIN — Medication 1 PATCH: at 11:58

## 2019-07-23 RX ADMIN — Medication 100 MILLIGRAM(S): at 05:06

## 2019-07-23 RX ADMIN — Medication 1 PATCH: at 19:45

## 2019-07-23 RX ADMIN — Medication 102 MILLIGRAM(S): at 04:35

## 2019-07-23 RX ADMIN — PANTOPRAZOLE SODIUM 40 MILLIGRAM(S): 20 TABLET, DELAYED RELEASE ORAL at 11:40

## 2019-07-23 RX ADMIN — Medication 3 MILLILITER(S): at 18:38

## 2019-07-23 RX ADMIN — ENOXAPARIN SODIUM 40 MILLIGRAM(S): 100 INJECTION SUBCUTANEOUS at 11:41

## 2019-07-23 RX ADMIN — DEXMEDETOMIDINE HYDROCHLORIDE IN 0.9% SODIUM CHLORIDE 5.24 MICROGRAM(S)/KG/HR: 4 INJECTION INTRAVENOUS at 01:00

## 2019-07-23 RX ADMIN — MEROPENEM 100 MILLIGRAM(S): 1 INJECTION INTRAVENOUS at 05:05

## 2019-07-23 RX ADMIN — AZITHROMYCIN 255 MILLIGRAM(S): 500 TABLET, FILM COATED ORAL at 12:50

## 2019-07-23 RX ADMIN — PROPOFOL 31.41 MICROGRAM(S)/KG/MIN: 10 INJECTION, EMULSION INTRAVENOUS at 22:44

## 2019-07-23 RX ADMIN — PROPOFOL 31.41 MICROGRAM(S)/KG/MIN: 10 INJECTION, EMULSION INTRAVENOUS at 04:35

## 2019-07-23 RX ADMIN — Medication 1 PATCH: at 07:00

## 2019-07-23 RX ADMIN — CHLORHEXIDINE GLUCONATE 15 MILLILITER(S): 213 SOLUTION TOPICAL at 17:31

## 2019-07-23 RX ADMIN — Medication 153 MILLIGRAM(S): at 16:54

## 2019-07-23 RX ADMIN — PROPOFOL 31.41 MICROGRAM(S)/KG/MIN: 10 INJECTION, EMULSION INTRAVENOUS at 07:10

## 2019-07-23 RX ADMIN — DEXMEDETOMIDINE HYDROCHLORIDE IN 0.9% SODIUM CHLORIDE 5.24 MICROGRAM(S)/KG/HR: 4 INJECTION INTRAVENOUS at 05:05

## 2019-07-23 RX ADMIN — DEXMEDETOMIDINE HYDROCHLORIDE IN 0.9% SODIUM CHLORIDE 5.24 MICROGRAM(S)/KG/HR: 4 INJECTION INTRAVENOUS at 17:32

## 2019-07-23 RX ADMIN — Medication 100 MILLIGRAM(S): at 11:39

## 2019-07-23 RX ADMIN — MEROPENEM 100 MILLIGRAM(S): 1 INJECTION INTRAVENOUS at 21:53

## 2019-07-23 RX ADMIN — DEXMEDETOMIDINE HYDROCHLORIDE IN 0.9% SODIUM CHLORIDE 5.24 MICROGRAM(S)/KG/HR: 4 INJECTION INTRAVENOUS at 09:16

## 2019-07-23 RX ADMIN — Medication 153 MILLIGRAM(S): at 23:07

## 2019-07-23 RX ADMIN — CHLORHEXIDINE GLUCONATE 1 APPLICATION(S): 213 SOLUTION TOPICAL at 11:42

## 2019-07-23 RX ADMIN — DEXMEDETOMIDINE HYDROCHLORIDE IN 0.9% SODIUM CHLORIDE 5.24 MICROGRAM(S)/KG/HR: 4 INJECTION INTRAVENOUS at 21:31

## 2019-07-23 RX ADMIN — Medication 166.67 MILLIGRAM(S): at 05:05

## 2019-07-23 RX ADMIN — Medication 153 MILLIGRAM(S): at 11:39

## 2019-07-23 RX ADMIN — Medication 3 MILLILITER(S): at 00:51

## 2019-07-23 RX ADMIN — PROPOFOL 31.41 MICROGRAM(S)/KG/MIN: 10 INJECTION, EMULSION INTRAVENOUS at 16:00

## 2019-07-23 RX ADMIN — CHLORHEXIDINE GLUCONATE 15 MILLILITER(S): 213 SOLUTION TOPICAL at 05:06

## 2019-07-23 RX ADMIN — Medication 3 MILLILITER(S): at 05:57

## 2019-07-23 RX ADMIN — PROPOFOL 31.41 MICROGRAM(S)/KG/MIN: 10 INJECTION, EMULSION INTRAVENOUS at 01:00

## 2019-07-23 RX ADMIN — Medication 102 MILLIGRAM(S): at 16:54

## 2019-07-23 NOTE — DIETITIAN INITIAL EVALUATION ADULT. - PHYSICAL APPEARANCE
other (specify)/BMI 32.2; 2+ generalized edema noted/obese Nutrition focused physical exam conducted:  Subcutaneous fat loss: [ WDL ] Orbital fat pads region, [ +vent ]Buccal fat region, [ WDL  ]Triceps region,  [ WDL ]Ribs region.  Muscle wasting: [WDL  ]Temples region, [WDL ]Clavicle region, [ WDL ]Shoulder region, [ unable ]Scapula region, [ edemas ]Interosseous region,  [ edemas  ]thigh region, [ WDL ]Calf region

## 2019-07-23 NOTE — PROGRESS NOTE ADULT - ASSESSMENT
32 yo M p/w PNA, ARDS and currently intubated and in prone positioning. Now + for legionella Ag    Neuro  cont sedation with propofol and fentanyl and precedex  cont nimbex gtt for ventilator dyssynchrony- monitor TOF      Pulm  ARDS from legionella PNA w/ low P/F ratio overnight and was proned and paralyzed  plateau pressure 32   cont low tidal volume ventilation  cont decrease fio2 and cont peep at 15  cont abx w/ broad spectrum and double coverage for legionella  f/u BAL cx and cell count  f/u other autoimmune w/u and fungal w/u   HIV negative and f/u cd4    CVS  on pressors from sepsis and sedation related reason  cont levophed  cont Vit C protocol for septic shock     Renal  good UO   cont to monitor UO  hold fluids for now as pt becoming significantly fluid positive     Discussion had with family today and they are aware of his critical condition and all questions answered    Critical Care Time 60min 32 yo M p/w PNA, ARDS and currently intubated and in prone positioning. Now + for legionella Ag. Slowly improving w/ clearing CXR and decreasing vent parameters.     Neuro  cont sedation with propofol and fentanyl and precedex  cont nimbex gtt for ventilator dyssynchrony- monitor TOF      Pulm  ARDS from legionella PNA w/ low P/F ratio overnight and was proned and paralyzed  plateau pressure 32   cont low tidal volume ventilation  cont decrease fio2 and cont peep at 15  cont abx w/ broad spectrum and double coverage for legionella  f/u BAL cx and cell count  f/u other autoimmune w/u and fungal w/u   HIV negative and f/u cd4    CVS  on pressors from sepsis and sedation related reason  cont levophed  cont Vit C protocol for septic shock     Renal  good UO   cont to monitor UO  hold fluids for now as pt becoming significantly fluid positive     Discussion had with family today and they are aware of his critical condition and all questions answered    Critical Care Time 60min 32 yo M p/w PNA, ARDS and currently intubated and in prone positioning. Now + for legionella Ag. Slowly improving w/ clearing CXR and decreasing vent parameters.     Neuro  cont sedation with propofol and fentanyl and precedex  cont nimbex gtt for ventilator dyssynchrony- monitor TOF      Pulm  ARDS from legionella PNA s/p prone ventilation but remained supine now because P/F ratio significantly improved   plateau pressure 32 yesterday and today decreased to 29  cont low tidal volume ventilation  cont decrease fio2 and cont to decrease peep to 10  cont abx w/ broad spectrum and double coverage for legionella  d/c vanco  f/u BAL cx and cell count -c/w neutrophilic alveolitis  f/u other autoimmune w/u and fungal w/u - so far not significantly positive  HIV negative but cd4 low range, f/u viral load    CVS  was on pressors from sepsis and sedation related reasons but now off  off levophed  cont Vit C protocol for septic shock     Renal  good UO but Cr increasing   cont to monitor UO  hold maintenance fluids for now as pt becoming significantly fluid positive     GI  started tube feeding        Critical Care Time 60min

## 2019-07-23 NOTE — PROGRESS NOTE ADULT - SUBJECTIVE AND OBJECTIVE BOX
Patient is a 33y old  Male who presents with a chief complaint of Pneumonia (23 Jul 2019 15:54)      HPI:  33 years old male here c/o fever nausea and non-bloody diarrhea with diffused abd pain since yesterday. Pt denies headache, dizziness, blurred vision, light sensitivities, focal/distal weakness or numbness, neck/back pain, cough, sob, chest pain, vomiting, dysuria, hematuria or irregular bowel movements. Pt also denies recent traveling.   CXR showed RLL infiltrates, Abd CT revealed RLL PNA, he was started on IVF, IV antibiotics. (19 Jul 2019 18:39)      INTERVAL HPI/OVERNIGHT EVENTS:  CCU #2  Intubated (+) tolerating NGT feeds @ 30. No diarrhea    MEDICATIONS  (STANDING):  ALBUTerol/ipratropium for Nebulization 3 milliLiter(s) Nebulizer every 6 hours  ascorbic acid IVPB 1500 milliGRAM(s) IV Intermittent <User Schedule>  azithromycin  IVPB      azithromycin  IVPB 500 milliGRAM(s) IV Intermittent every 24 hours  chlorhexidine 0.12% Liquid 15 milliLiter(s) Oral Mucosa two times a day  chlorhexidine 4% Liquid 1 Application(s) Topical <User Schedule>  cisatracurium Infusion 3 MICROgram(s)/kG/Min (18.846 mL/Hr) IV Continuous <Continuous>  dexmedetomidine Infusion 0.2 MICROgram(s)/kG/Hr (5.235 mL/Hr) IV Continuous <Continuous>  dextrose 5%. 1000 milliLiter(s) (50 mL/Hr) IV Continuous <Continuous>  dextrose 50% Injectable 12.5 Gram(s) IV Push once  dextrose 50% Injectable 25 Gram(s) IV Push once  dextrose 50% Injectable 25 Gram(s) IV Push once  enoxaparin Injectable 40 milliGRAM(s) SubCutaneous daily  fentaNYL   Infusion. 0.5 MICROgram(s)/kG/Hr (5.235 mL/Hr) IV Continuous <Continuous>  hydrocortisone sodium succinate Injectable 100 milliGRAM(s) IV Push every 6 hours  insulin lispro (HumaLOG) corrective regimen sliding scale   SubCutaneous every 6 hours  levoFLOXacin IVPB 750 milliGRAM(s) IV Intermittent every 24 hours  meropenem  IVPB 1000 milliGRAM(s) IV Intermittent every 8 hours  nicotine -  14 mG/24Hr(s) Patch 1 patch Transdermal daily  norepinephrine Infusion 0.05 MICROgram(s)/kG/Min (4.908 mL/Hr) IV Continuous <Continuous>  pantoprazole   Suspension 40 milliGRAM(s) Oral daily  propofol Infusion 50 MICROgram(s)/kG/Min (31.41 mL/Hr) IV Continuous <Continuous>  thiamine IVPB 200 milliGRAM(s) IV Intermittent <User Schedule>    MEDICATIONS  (PRN):  acetaminophen   Tablet .. 650 milliGRAM(s) Oral every 6 hours PRN Temp greater or equal to 38C (100.4F), Mild Pain (1 - 3)  dextrose 40% Gel 15 Gram(s) Oral once PRN Blood Glucose LESS THAN 70 milliGRAM(s)/deciliter  glucagon  Injectable 1 milliGRAM(s) IntraMuscular once PRN Glucose LESS THAN 70 milligrams/deciliter  guaiFENesin    Syrup 200 milliGRAM(s) Oral every 6 hours PRN Cough      FAMILY HISTORY:  FH: HTN (hypertension)      Allergies    No Known Allergies    Intolerances        PMH/PSH:  No pertinent family history  No significant past surgical history        REVIEW OF SYSTEMS:  CONSTITUTIONAL: No fever, weight loss, or fatigue  RESPIRATORY: No cough, wheezing, chills or hemoptysis; No shortness of breath  CARDIOVASCULAR: No chest pain, palpitations, dizziness, or leg swelling  GASTROINTESTINAL: No abdominal or epigastric pain. No nausea, vomiting, or hematemesis; No diarrhea or constipation. No melena or hematochezia.    Vital Signs Last 24 Hrs  T(C): 36.8 (23 Jul 2019 15:00), Max: 38.8 (22 Jul 2019 19:45)  T(F): 98.3 (23 Jul 2019 15:00), Max: 101.8 (22 Jul 2019 19:45)  HR: 61 (23 Jul 2019 17:30) (60 - 91)  BP: 113/81 (23 Jul 2019 04:20) (107/74 - 113/81)  BP(mean): 91 (23 Jul 2019 04:20) (83 - 91)  RR: 22 (23 Jul 2019 17:30) (22 - 26)  SpO2: 97% (23 Jul 2019 17:30) (97% - 100%)    PHYSICAL EXAM:  GENERAL: NAD, well-groomed, well-developed  CHEST/LUNG: Clear to percussion bilaterally; No rales, rhonchi, wheezing, or rubs  HEART: Regular rate and rhythm; No murmurs, rubs, or gallops  ABDOMEN: Soft, Nontender, Nondistended; Bowel sounds present      LAB                          10.5   9.28  )-----------( 118      ( 23 Jul 2019 03:12 )             30.4       CBC:  07-23 @ 03:12  WBC 9.28   Hgb 10.5   Hct 30.4   Plts 118  MCV 87.1  07-22 @ 03:21  WBC 11.81   Hgb 12.0   Hct 34.4   Plts 148  MCV 85.4  07-21 @ 05:49  WBC 8.93   Hgb 12.7   Hct 37.3   Plts 102  MCV 85.0  07-20 @ 07:11  WBC 11.00   Hgb 13.1   Hct 39.8   Plts 115  MCV 87.3  07-19 @ 16:37  WBC 12.25   Hgb 14.6   Hct 44.4   Plts 142  MCV 87.6      Chemistry:  07-23 @ 03:12  Na+ 135  K+ 4.0  Cl- 103  CO2 23  BUN 16  Cr 1.44     07-22 @ 03:21  Na+ 137  K+ 3.9  Cl- 105  CO2 23  BUN 10  Cr 1.08     07-21 @ 05:49  Na+ 133  K+ 3.0  Cl- 104  CO2 22  BUN 5  Cr 0.95     07-20 @ 07:11  Na+ 136  K+ 3.4  Cl- 104  CO2 20  BUN 6  Cr 1.10     07-19 @ 16:37  Na+ 131  K+ 3.3  Cl- 98  CO2 24  BUN 10  Cr 1.54         Glucose, Serum: 140 mg/dL (07-23 @ 03:12)  Glucose, Serum: 154 mg/dL (07-22 @ 03:21)  Glucose, Serum: 121 mg/dL (07-21 @ 05:49)  Glucose, Serum: 110 mg/dL (07-20 @ 07:11)  Glucose, Serum: 155 mg/dL (07-19 @ 16:37)      23 Jul 2019 03:12    135    |  103    |  16     ----------------------------<  140    4.0     |  23     |  1.44   22 Jul 2019 03:21    137    |  105    |  10     ----------------------------<  154    3.9     |  23     |  1.08   21 Jul 2019 05:49    133    |  104    |  5      ----------------------------<  121    3.0     |  22     |  0.95   20 Jul 2019 07:11    136    |  104    |  6      ----------------------------<  110    3.4     |  20     |  1.10   19 Jul 2019 16:37    131    |  98     |  10     ----------------------------<  155    3.3     |  24     |  1.54     Ca    7.9        23 Jul 2019 03:12  Ca    8.0        22 Jul 2019 03:21  Ca    7.9        21 Jul 2019 05:49  Ca    7.3        20 Jul 2019 07:11  Ca    8.8        19 Jul 2019 16:37  Phos  3.0       23 Jul 2019 03:12  Phos  2.6       22 Jul 2019 03:21  Phos  1.3       21 Jul 2019 05:49  Mg     2.5       23 Jul 2019 03:12  Mg     2.5       22 Jul 2019 03:21  Mg     2.0       21 Jul 2019 05:49    TPro  6.0    /  Alb  1.9    /  TBili  0.5    /  DBili  .36    /  AST  32     /  ALT  32     /  AlkPhos  53     23 Jul 2019 03:12  TPro  6.9    /  Alb  2.5    /  TBili  0.6    /  DBili  x      /  AST  102    /  ALT  43     /  AlkPhos  61     21 Jul 2019 05:49  TPro  6.8    /  Alb  2.8    /  TBili  0.8    /  DBili  x      /  AST  60     /  ALT  43     /  AlkPhos  63     20 Jul 2019 07:11  TPro  8.4    /  Alb  3.4    /  TBili  0.7    /  DBili  x      /  AST  26     /  ALT  43     /  AlkPhos  76     19 Jul 2019 16:37      PT/INR - ( 23 Jul 2019 03:12 )   PT: 13.4 sec;   INR: 1.19 ratio         PTT - ( 23 Jul 2019 03:12 )  PTT:26.8 sec        CAPILLARY BLOOD GLUCOSE      POCT Blood Glucose.: 131 mg/dL (23 Jul 2019 17:28)  POCT Blood Glucose.: 129 mg/dL (23 Jul 2019 11:28)  POCT Blood Glucose.: 167 mg/dL (23 Jul 2019 05:20)  POCT Blood Glucose.: 157 mg/dL (22 Jul 2019 23:22)      C-Reactive Protein, Serum: 12.13 mg/dL (07-23 @ 09:44)      RADIOLOGY & ADDITIONAL TESTS:    Imaging Personally Reviewed:  [ ] YES  [ ] NO    Consultant(s) Notes Reviewed:  [ ] YES  [ ] NO    Care Discussed with Consultants/Other Providers [ ] YES  [ ] NO

## 2019-07-23 NOTE — DIETITIAN INITIAL EVALUATION ADULT. - ENTERAL
Vital AF; total volume: 1680 mL, Goal Rate:  70mL/hr (provides 2016 kcal, 126 grams of protein, 1360 mL free H2O) + 250 mL H2O flush q6hr

## 2019-07-23 NOTE — DIETITIAN INITIAL EVALUATION ADULT. - OTHER INFO
Pt intubated/sedated spoke with sister at bedside. Pt lives with sister. Sister & pt share cooking and shopping duties. Independent with ADL. No difficulties chewing/swallowing. Sister reported good appetite, no N/V/D/C PTA. Pt with lose stools during admission. Sister reported pt does not follow any therapeutic diet, food preference high in refined carbohydrates, and saturated fats; request nutrition-related education to promote overall health once pt extubated.

## 2019-07-23 NOTE — PROGRESS NOTE ADULT - SUBJECTIVE AND OBJECTIVE BOX
INTERVAL HPI:  33 years old male with no significant medical history here c/o fever nausea and non-bloody diarrhea with diffused abd pain for one day. Started about 4 days prior to that with right lower chest, back and knee pain along with profuse diarrhea. Also with diffuse body ache, sweating and chills. Then started with sob.  Admitted with RLL pneumonia.  07/20/19: With increasing Respiratory distress, temperature up to 104 with chills. RRT was called, got started on BIPAP support with some improvement.   Active smoker 1 ppd. uses alcohol and Marijuana socially.  07/21/19:  Transferred to ICU due to worsening Respiratory status.  Got intubated, on vent with high FIO2 and peep.  07/22/19: Legionella urine antigen positive.    OVERNIGHT EVENTS:  In ICU, on Vent. off pressor. sedated, paralyzed.    Vital Signs Last 24 Hrs  T(C): 36.8 (23 Jul 2019 15:00), Max: 38.8 (22 Jul 2019 19:45)  T(F): 98.3 (23 Jul 2019 15:00), Max: 101.8 (22 Jul 2019 19:45)  HR: 64 (23 Jul 2019 17:00) (60 - 93)  BP: 113/81 (23 Jul 2019 04:20) (106/71 - 113/81)  BP(mean): 91 (23 Jul 2019 04:20) (81 - 91)  RR: 22 (23 Jul 2019 17:00) (22 - 30)  SpO2: 97% (23 Jul 2019 17:00) (97% - 100%)    Mode: AC/ CMV (Assist Control/ Continuous Mandatory Ventilation)  RR (machine): 22  TV (machine): 450  FiO2: 50  PEEP: 10  ITime: 1  MAP: 19  PIP: 36    PHYSICAL EXAM:  GEN:        comfortable. sedated.  HEENT:     ETT    RESP:       no distress.  CVS:          Regular rate and rhythm.   ABD:         Soft, non-tender, non-distended;   EXTR:       normal;    MEDICATIONS  (STANDING):  ALBUTerol/ipratropium for Nebulization 3 milliLiter(s) Nebulizer every 6 hours  ascorbic acid IVPB 1500 milliGRAM(s) IV Intermittent <User Schedule>  azithromycin  IVPB      azithromycin  IVPB 500 milliGRAM(s) IV Intermittent every 24 hours  chlorhexidine 0.12% Liquid 15 milliLiter(s) Oral Mucosa two times a day  chlorhexidine 4% Liquid 1 Application(s) Topical <User Schedule>  cisatracurium Infusion 3 MICROgram(s)/kG/Min (18.846 mL/Hr) IV Continuous <Continuous>  dexmedetomidine Infusion 0.2 MICROgram(s)/kG/Hr (5.235 mL/Hr) IV Continuous <Continuous>  dextrose 5%. 1000 milliLiter(s) (50 mL/Hr) IV Continuous <Continuous>  dextrose 50% Injectable 12.5 Gram(s) IV Push once  dextrose 50% Injectable 25 Gram(s) IV Push once  dextrose 50% Injectable 25 Gram(s) IV Push once  enoxaparin Injectable 40 milliGRAM(s) SubCutaneous daily  fentaNYL   Infusion. 0.5 MICROgram(s)/kG/Hr (5.235 mL/Hr) IV Continuous <Continuous>  hydrocortisone sodium succinate Injectable 100 milliGRAM(s) IV Push every 6 hours  insulin lispro (HumaLOG) corrective regimen sliding scale   SubCutaneous every 6 hours  levoFLOXacin IVPB 750 milliGRAM(s) IV Intermittent every 24 hours  meropenem  IVPB 1000 milliGRAM(s) IV Intermittent every 8 hours  nicotine -  14 mG/24Hr(s) Patch 1 patch Transdermal daily  norepinephrine Infusion 0.05 MICROgram(s)/kG/Min (4.908 mL/Hr) IV Continuous <Continuous>  pantoprazole   Suspension 40 milliGRAM(s) Oral daily  propofol Infusion 50 MICROgram(s)/kG/Min (31.41 mL/Hr) IV Continuous <Continuous>  thiamine IVPB 200 milliGRAM(s) IV Intermittent <User Schedule>    MEDICATIONS  (PRN):  acetaminophen   Tablet .. 650 milliGRAM(s) Oral every 6 hours PRN Temp greater or equal to 38C (100.4F), Mild Pain (1 - 3)  dextrose 40% Gel 15 Gram(s) Oral once PRN Blood Glucose LESS THAN 70 milliGRAM(s)/deciliter  glucagon  Injectable 1 milliGRAM(s) IntraMuscular once PRN Glucose LESS THAN 70 milligrams/deciliter  guaiFENesin    Syrup 200 milliGRAM(s) Oral every 6 hours PRN Cough    LABS:                        10.5   9.28  )-----------( 118      ( 23 Jul 2019 03:12 )             30.4     07-23    135  |  103  |  16  ----------------------------<  140<H>  4.0   |  23  |  1.44<H>    Ca    7.9<L>      23 Jul 2019 03:12  Phos  3.0     07-23  Mg     2.5     07-23    TPro  6.0  /  Alb  1.9<L>  /  TBili  0.5  /  DBili  .36<H>  /  AST  32  /  ALT  32  /  AlkPhos  53  07-23    PT/INR - ( 23 Jul 2019 03:12 )   PT: 13.4 sec;   INR: 1.19 ratio      PTT - ( 23 Jul 2019 03:12 )  PTT:26.8 sec  07-23 @ 11:57  pH: 7.37  pCO2: 43  pO2: 111  SaO2: 98  07-23 @ 07:54  pH: 7.45  pCO2: 34  pO2: 115  SaO2: 99  07-22 @ 20:04  pH: 7.41  pCO2: 39  pO2: 78  SaO2: 96  07-22 @ 09:05  pH: 7.38  pCO2: 39  pO2: 95  SaO2: 98  07-22 @ 02:20  pH: 7.37  pCO2: 33  pO2: 116  SaO2: 99  07-21 @ 21:32  pH: 7.37  pCO2: 39  pO2: 86  SaO2: 97  07-21 @ 21:16  pH: 7.36  pCO2: 41  pO2: 62  SaO2: 92  07-21 @ 17:30  pH: 7.29  pCO2: 49  pO2: 102  SaO2: 97  07-21 @ 06:17  pH: 7.46  pCO2: 31  pO2: 82  SaO2: 97  07-20 @ 15:06  pH: 7.49  pCO2: 25  pO2: 70  SaO2: 96    ASSESSMENT AND PLAN:  ·	Acute Hypoxic Respiratory failure.  ·	Legionella Pneumonia.  ·	ARDS.  ·	Sepsis.  ·	Diarrhea.  ·	Hypokalemia better.  ·	Tobacco abuse.  ·	Obesity.    Oxygenation improving.  titrate down paralytics and paralytics.

## 2019-07-23 NOTE — PROGRESS NOTE ADULT - SUBJECTIVE AND OBJECTIVE BOX
Patient is a 33y old  Male who presents with a chief complaint of Respiratory failure (22 Jul 2019 21:00)      INTERVAL HPI / OVERNIGHT EVENTS: pt in bed, intubated and sedated    MEDICATIONS  (STANDING):  ALBUTerol/ipratropium for Nebulization 3 milliLiter(s) Nebulizer every 6 hours  ascorbic acid IVPB 1500 milliGRAM(s) IV Intermittent <User Schedule>  azithromycin  IVPB      azithromycin  IVPB 500 milliGRAM(s) IV Intermittent every 24 hours  chlorhexidine 0.12% Liquid 15 milliLiter(s) Oral Mucosa two times a day  chlorhexidine 4% Liquid 1 Application(s) Topical <User Schedule>  cisatracurium Infusion 3 MICROgram(s)/kG/Min (18.846 mL/Hr) IV Continuous <Continuous>  dexmedetomidine Infusion 0.2 MICROgram(s)/kG/Hr (5.235 mL/Hr) IV Continuous <Continuous>  dextrose 5%. 1000 milliLiter(s) (50 mL/Hr) IV Continuous <Continuous>  dextrose 50% Injectable 12.5 Gram(s) IV Push once  dextrose 50% Injectable 25 Gram(s) IV Push once  dextrose 50% Injectable 25 Gram(s) IV Push once  enoxaparin Injectable 40 milliGRAM(s) SubCutaneous daily  fentaNYL   Infusion. 0.5 MICROgram(s)/kG/Hr (5.235 mL/Hr) IV Continuous <Continuous>  hydrocortisone sodium succinate Injectable 100 milliGRAM(s) IV Push every 6 hours  insulin lispro (HumaLOG) corrective regimen sliding scale   SubCutaneous every 6 hours  levoFLOXacin IVPB 750 milliGRAM(s) IV Intermittent every 24 hours  meropenem  IVPB 1000 milliGRAM(s) IV Intermittent every 8 hours  nicotine -  14 mG/24Hr(s) Patch 1 patch Transdermal daily  norepinephrine Infusion 0.05 MICROgram(s)/kG/Min (4.908 mL/Hr) IV Continuous <Continuous>  pantoprazole   Suspension 40 milliGRAM(s) Oral daily  propofol Infusion 50 MICROgram(s)/kG/Min (31.41 mL/Hr) IV Continuous <Continuous>  thiamine IVPB 200 milliGRAM(s) IV Intermittent <User Schedule>    MEDICATIONS  (PRN):  acetaminophen   Tablet .. 650 milliGRAM(s) Oral every 6 hours PRN Temp greater or equal to 38C (100.4F), Mild Pain (1 - 3)  dextrose 40% Gel 15 Gram(s) Oral once PRN Blood Glucose LESS THAN 70 milliGRAM(s)/deciliter  glucagon  Injectable 1 milliGRAM(s) IntraMuscular once PRN Glucose LESS THAN 70 milligrams/deciliter  guaiFENesin    Syrup 200 milliGRAM(s) Oral every 6 hours PRN Cough      Vital Signs Last 24 Hrs  T(C): 36.8 (23 Jul 2019 15:00), Max: 38.8 (22 Jul 2019 19:45)  T(F): 98.3 (23 Jul 2019 15:00), Max: 101.8 (22 Jul 2019 19:45)  HR: 61 (23 Jul 2019 15:00) (60 - 93)  BP: 113/81 (23 Jul 2019 04:20) (106/71 - 113/81)  BP(mean): 91 (23 Jul 2019 04:20) (81 - 91)  RR: 22 (23 Jul 2019 15:00) (22 - 30)  SpO2: 99% (23 Jul 2019 15:00) (95% - 100%)    Review of systems:  cant be obtained as sedated        PHYSICAL EXAM:  General :NAD  Constitutional: well-groomed, well-developed  Respiratory: CTAB/L,on vent  Cardiovascular: S1 and S2, RRR, no M/G/R  Gastrointestinal: BS+, soft, NT/ND  Extremities: No peripheral edema  Vascular: 2+ peripheral pulses  Skin: No rashes  peripheral lines ,Left TLC +      LABS:                        10.5   9.28  )-----------( 118      ( 23 Jul 2019 03:12 )             30.4     07-23    135  |  103  |  16  ----------------------------<  140<H>  4.0   |  23  |  1.44<H>    Ca    7.9<L>      23 Jul 2019 03:12  Phos  3.0     07-23  Mg     2.5     07-23    TPro  6.0  /  Alb  1.9<L>  /  TBili  0.5  /  DBili  .36<H>  /  AST  32  /  ALT  32  /  AlkPhos  53  07-23          MICROBIOLOGY:  RECENT CULTURES:  07-21 Bronch Wash XXXX   Few polymorphonuclear leukocytes per low power field  No squamous epithelial cells per low power field  No organisms seen   No growth to date.    07-20 .Stool XXXX XXXX   No enteric pathogens isolated.  (Stool culture examined for Salmonella,  Shigella, Campylobacter, Aeromonas, Plesiomonas,  Vibrio, E.coli O157 and Yersinia)    07-20 .Blood XXXX XXXX   No growth to date.          RADIOLOGY & ADDITIONAL STUDIES:

## 2019-07-23 NOTE — PROGRESS NOTE ADULT - SUBJECTIVE AND OBJECTIVE BOX
HPI:  33 years old male here c/o fever nausea and non-bloody diarrhea with diffused abd pain since yesterday. Pt denies headache, dizziness, blurred vision, light sensitivities, focal/distal weakness or numbness, neck/back pain, cough, sob, chest pain, vomiting, dysuria, hematuria or irregular bowel movements. Pt also denies recent traveling.   CXR showed RLL infiltrates, Abd CT revealed RLL PNA, he was started on IVF, IV antibiotics. (19 Jul 2019 18:39)      24 hr events:  Pt supined overnight and not proned again as oxygenation    ## ROS:  [x ] unable to obtain as pt intubated/sedated      ## Labs:                        12.0   11.81 )-----------( 148      ( 22 Jul 2019 03:21 )             34.4   07-22    137  |  105  |  10  ----------------------------<  154<H>  3.9   |  23  |  1.08    Ca    8.0<L>      22 Jul 2019 03:21  Phos  2.6     07-22  Mg     2.5     07-22    TPro  6.9  /  Alb  2.5<L>  /  TBili  0.6  /  DBili  x   /  AST  102<H>  /  ALT  43  /  AlkPhos  61  07-21      MEDICATIONS  (STANDING):  ALBUTerol/ipratropium for Nebulization 3 milliLiter(s) Nebulizer every 6 hours  ascorbic acid IVPB 1500 milliGRAM(s) IV Intermittent <User Schedule>  azithromycin  IVPB      azithromycin  IVPB 500 milliGRAM(s) IV Intermittent once  chlorhexidine 0.12% Liquid 15 milliLiter(s) Oral Mucosa two times a day  chlorhexidine 4% Liquid 1 Application(s) Topical <User Schedule>  cisatracurium Infusion 3 MICROgram(s)/kG/Min (18.846 mL/Hr) IV Continuous <Continuous>  dexmedetomidine Infusion 0.2 MICROgram(s)/kG/Hr (5.235 mL/Hr) IV Continuous <Continuous>  dextrose 5%. 1000 milliLiter(s) (50 mL/Hr) IV Continuous <Continuous>  dextrose 50% Injectable 12.5 Gram(s) IV Push once  dextrose 50% Injectable 25 Gram(s) IV Push once  dextrose 50% Injectable 25 Gram(s) IV Push once  enoxaparin Injectable 40 milliGRAM(s) SubCutaneous daily  fentaNYL   Infusion. 0.5 MICROgram(s)/kG/Hr (5.235 mL/Hr) IV Continuous <Continuous>  hydrocortisone sodium succinate Injectable 100 milliGRAM(s) IV Push every 6 hours  insulin lispro (HumaLOG) corrective regimen sliding scale   SubCutaneous every 6 hours  levoFLOXacin IVPB 750 milliGRAM(s) IV Intermittent every 24 hours  meropenem  IVPB 500 milliGRAM(s) IV Intermittent every 8 hours  nicotine -  14 mG/24Hr(s) Patch 1 patch Transdermal daily  norepinephrine Infusion 0.05 MICROgram(s)/kG/Min (4.908 mL/Hr) IV Continuous <Continuous>  pantoprazole  Injectable 40 milliGRAM(s) IV Push daily  propofol Infusion 50 MICROgram(s)/kG/Min (31.41 mL/Hr) IV Continuous <Continuous>  thiamine IVPB 200 milliGRAM(s) IV Intermittent <User Schedule>  vancomycin  IVPB 1000 milliGRAM(s) IV Intermittent every 8 hours    ## Imaging:  bilateral airspace disease (coarse/hazy opacities). Small effusion. No PE    ICU Vital Signs Last 24 Hrs  T(C): 37.7 (22 Jul 2019 11:00), Max: 38.3 (21 Jul 2019 17:39)  T(F): 99.8 (22 Jul 2019 11:00), Max: 100.9 (21 Jul 2019 17:39)  HR: 80 (22 Jul 2019 13:45) (71 - 170)  BP: 100/69 (21 Jul 2019 23:05) (76/48 - 156/89)  BP(mean): 79 (21 Jul 2019 23:05) (58 - 119)  ABP: 115/69 (22 Jul 2019 13:45) (86/64 - 259/67)  ABP(mean): 83 (22 Jul 2019 13:45) (71 - 201)  RR: 26 (22 Jul 2019 13:45) (0 - 33)  SpO2: 99% (22 Jul 2019 13:45) (94% - 100%)      ## P/E:  Gen: intubated, prone positioning  HEENT: unable to assess currently due to prone positioning   Resp: b/l rhonchi  CVS: S1S2 no m/r/g  Abd: soft NT/ND +BS  Ext: no c/c/e  Neuro: sedated, intubated HPI:  33 years old male here c/o fever nausea and non-bloody diarrhea with diffused abd pain since yesterday. Pt denies headache, dizziness, blurred vision, light sensitivities, focal/distal weakness or numbness, neck/back pain, cough, sob, chest pain, vomiting, dysuria, hematuria or irregular bowel movements. Pt also denies recent traveling.   CXR showed RLL infiltrates, Abd CT revealed RLL PNA, he was started on IVF, IV antibiotics. (19 Jul 2019 18:39)      24 hr events:  Pt supined overnight and not proned again as oxygenation improved  PEEP being titrated down    ## ROS:  [x ] unable to obtain as pt intubated/sedated      ## Labs:                        10.5   9.28  )-----------( 118      ( 23 Jul 2019 03:12 )             30.4   07-23    135  |  103  |  16  ----------------------------<  140<H>  4.0   |  23  |  1.44<H>    Ca    7.9<L>      23 Jul 2019 03:12  Phos  3.0     07-23  Mg     2.5     07-23    TPro  6.0  /  Alb  1.9<L>  /  TBili  0.5  /  DBili  .36<H>  /  AST  32  /  ALT  32  /  AlkPhos  53  07-23    MEDICATIONS  (STANDING):  ALBUTerol/ipratropium for Nebulization 3 milliLiter(s) Nebulizer every 6 hours  ascorbic acid IVPB 1500 milliGRAM(s) IV Intermittent <User Schedule>  azithromycin  IVPB      azithromycin  IVPB 500 milliGRAM(s) IV Intermittent every 24 hours  chlorhexidine 0.12% Liquid 15 milliLiter(s) Oral Mucosa two times a day  chlorhexidine 4% Liquid 1 Application(s) Topical <User Schedule>  cisatracurium Infusion 3 MICROgram(s)/kG/Min (18.846 mL/Hr) IV Continuous <Continuous>  dexmedetomidine Infusion 0.2 MICROgram(s)/kG/Hr (5.235 mL/Hr) IV Continuous <Continuous>  dextrose 5%. 1000 milliLiter(s) (50 mL/Hr) IV Continuous <Continuous>  dextrose 50% Injectable 12.5 Gram(s) IV Push once  dextrose 50% Injectable 25 Gram(s) IV Push once  dextrose 50% Injectable 25 Gram(s) IV Push once  enoxaparin Injectable 40 milliGRAM(s) SubCutaneous daily  fentaNYL   Infusion. 0.5 MICROgram(s)/kG/Hr (5.235 mL/Hr) IV Continuous <Continuous>  hydrocortisone sodium succinate Injectable 100 milliGRAM(s) IV Push every 6 hours  insulin lispro (HumaLOG) corrective regimen sliding scale   SubCutaneous every 6 hours  levoFLOXacin IVPB 750 milliGRAM(s) IV Intermittent every 24 hours  meropenem  IVPB 1000 milliGRAM(s) IV Intermittent every 8 hours  nicotine -  14 mG/24Hr(s) Patch 1 patch Transdermal daily  norepinephrine Infusion 0.05 MICROgram(s)/kG/Min (4.908 mL/Hr) IV Continuous <Continuous>  pantoprazole   Suspension 40 milliGRAM(s) Oral daily  propofol Infusion 50 MICROgram(s)/kG/Min (31.41 mL/Hr) IV Continuous <Continuous>  thiamine IVPB 200 milliGRAM(s) IV Intermittent <User Schedule>      ## Imaging:  bilateral airspace disease (coarse/hazy opacities). Small effusion. No PE    MEDICATIONS  (STANDING):  ALBUTerol/ipratropium for Nebulization 3 milliLiter(s) Nebulizer every 6 hours  ascorbic acid IVPB 1500 milliGRAM(s) IV Intermittent <User Schedule>  azithromycin  IVPB      azithromycin  IVPB 500 milliGRAM(s) IV Intermittent every 24 hours  chlorhexidine 0.12% Liquid 15 milliLiter(s) Oral Mucosa two times a day  chlorhexidine 4% Liquid 1 Application(s) Topical <User Schedule>  cisatracurium Infusion 3 MICROgram(s)/kG/Min (18.846 mL/Hr) IV Continuous <Continuous>  dexmedetomidine Infusion 0.2 MICROgram(s)/kG/Hr (5.235 mL/Hr) IV Continuous <Continuous>  dextrose 5%. 1000 milliLiter(s) (50 mL/Hr) IV Continuous <Continuous>  dextrose 50% Injectable 12.5 Gram(s) IV Push once  dextrose 50% Injectable 25 Gram(s) IV Push once  dextrose 50% Injectable 25 Gram(s) IV Push once  enoxaparin Injectable 40 milliGRAM(s) SubCutaneous daily  fentaNYL   Infusion. 0.5 MICROgram(s)/kG/Hr (5.235 mL/Hr) IV Continuous <Continuous>  hydrocortisone sodium succinate Injectable 100 milliGRAM(s) IV Push every 6 hours  insulin lispro (HumaLOG) corrective regimen sliding scale   SubCutaneous every 6 hours  levoFLOXacin IVPB 750 milliGRAM(s) IV Intermittent every 24 hours  meropenem  IVPB 1000 milliGRAM(s) IV Intermittent every 8 hours  nicotine -  14 mG/24Hr(s) Patch 1 patch Transdermal daily  norepinephrine Infusion 0.05 MICROgram(s)/kG/Min (4.908 mL/Hr) IV Continuous <Continuous>  pantoprazole   Suspension 40 milliGRAM(s) Oral daily  propofol Infusion 50 MICROgram(s)/kG/Min (31.41 mL/Hr) IV Continuous <Continuous>  thiamine IVPB 200 milliGRAM(s) IV Intermittent <User Schedule>    ## P/E:  Gen: intubated, supine positioning  HEENT: RADHA   Resp: b/l rhonchi  CVS: S1S2 no m/r/g  Abd: soft NT/ND +BS  Ext: no c/c/e  Neuro: sedated, intubated

## 2019-07-23 NOTE — PROGRESS NOTE ADULT - ASSESSMENT
Pt is a 33 yr old otherwise healthy male who was admitted with pneumonia ,found to be with legionella pneumonia (urine antigen positive ) but with septic shock with acute respiratory failure and  ARDS in ICU on pressors

## 2019-07-23 NOTE — PROGRESS NOTE ADULT - PROBLEM SELECTOR PLAN 6
sec to antibiotics /septic shock   dc vanco   monitor cr cl
- Echo.  - D/c IVF.  - will consider cardiology eval if echo is abnl.

## 2019-07-24 LAB
ANION GAP SERPL CALC-SCNC: 10 MMOL/L — SIGNIFICANT CHANGE UP (ref 5–17)
ANION GAP SERPL CALC-SCNC: 7 MMOL/L — SIGNIFICANT CHANGE UP (ref 5–17)
BASE EXCESS BLDA CALC-SCNC: 2.9 MMOL/L — HIGH (ref -2–2)
BLOOD GAS COMMENTS: SIGNIFICANT CHANGE UP
BLOOD GAS SOURCE: SIGNIFICANT CHANGE UP
BUN SERPL-MCNC: 28 MG/DL — HIGH (ref 7–23)
BUN SERPL-MCNC: 30 MG/DL — HIGH (ref 7–23)
CALCIUM SERPL-MCNC: 7.8 MG/DL — LOW (ref 8.5–10.1)
CALCIUM SERPL-MCNC: 8 MG/DL — LOW (ref 8.5–10.1)
CHLORIDE SERPL-SCNC: 102 MMOL/L — SIGNIFICANT CHANGE UP (ref 96–108)
CHLORIDE SERPL-SCNC: 102 MMOL/L — SIGNIFICANT CHANGE UP (ref 96–108)
CO2 SERPL-SCNC: 28 MMOL/L — SIGNIFICANT CHANGE UP (ref 22–31)
CO2 SERPL-SCNC: 29 MMOL/L — SIGNIFICANT CHANGE UP (ref 22–31)
CREAT SERPL-MCNC: 1.4 MG/DL — HIGH (ref 0.5–1.3)
CREAT SERPL-MCNC: 1.48 MG/DL — HIGH (ref 0.5–1.3)
GLUCOSE BLDC GLUCOMTR-MCNC: 145 MG/DL — HIGH (ref 70–99)
GLUCOSE BLDC GLUCOMTR-MCNC: 147 MG/DL — HIGH (ref 70–99)
GLUCOSE BLDC GLUCOMTR-MCNC: 180 MG/DL — HIGH (ref 70–99)
GLUCOSE SERPL-MCNC: 130 MG/DL — HIGH (ref 70–99)
GLUCOSE SERPL-MCNC: 165 MG/DL — HIGH (ref 70–99)
HCO3 BLDA-SCNC: 29 MMOL/L — SIGNIFICANT CHANGE UP (ref 21–29)
HCT VFR BLD CALC: 31.7 % — LOW (ref 39–50)
HGB BLD-MCNC: 10.5 G/DL — LOW (ref 13–17)
HIV-1 VIRAL LOAD RESULT: SIGNIFICANT CHANGE UP
HIV1 RNA # SERPL NAA+PROBE: SIGNIFICANT CHANGE UP
HIV1 RNA SER-IMP: SIGNIFICANT CHANGE UP
HIV1 RNA SERPL NAA+PROBE-ACNC: SIGNIFICANT CHANGE UP
HIV1 RNA SERPL NAA+PROBE-LOG#: SIGNIFICANT CHANGE UP LG COP/ML
HOROWITZ INDEX BLDA+IHG-RTO: SIGNIFICANT CHANGE UP
MAGNESIUM SERPL-MCNC: 2.6 MG/DL — SIGNIFICANT CHANGE UP (ref 1.6–2.6)
MCHC RBC-ENTMCNC: 29.4 PG — SIGNIFICANT CHANGE UP (ref 27–34)
MCHC RBC-ENTMCNC: 33.1 GM/DL — SIGNIFICANT CHANGE UP (ref 32–36)
MCV RBC AUTO: 88.8 FL — SIGNIFICANT CHANGE UP (ref 80–100)
NRBC # BLD: 0 /100 WBCS — SIGNIFICANT CHANGE UP (ref 0–0)
PCO2 BLDA: 54 MMHG — HIGH (ref 32–46)
PH BLD: 7.35 — SIGNIFICANT CHANGE UP (ref 7.35–7.45)
PHOSPHATE SERPL-MCNC: 4 MG/DL — SIGNIFICANT CHANGE UP (ref 2.5–4.5)
PLATELET # BLD AUTO: 182 K/UL — SIGNIFICANT CHANGE UP (ref 150–400)
PO2 BLDA: 80 MMHG — SIGNIFICANT CHANGE UP (ref 74–108)
POTASSIUM SERPL-MCNC: 3.6 MMOL/L — SIGNIFICANT CHANGE UP (ref 3.5–5.3)
POTASSIUM SERPL-MCNC: 3.8 MMOL/L — SIGNIFICANT CHANGE UP (ref 3.5–5.3)
POTASSIUM SERPL-SCNC: 3.6 MMOL/L — SIGNIFICANT CHANGE UP (ref 3.5–5.3)
POTASSIUM SERPL-SCNC: 3.8 MMOL/L — SIGNIFICANT CHANGE UP (ref 3.5–5.3)
RBC # BLD: 3.57 M/UL — LOW (ref 4.2–5.8)
RBC # FLD: 15 % — HIGH (ref 10.3–14.5)
SAO2 % BLDA: 95 % — SIGNIFICANT CHANGE UP (ref 92–96)
SODIUM SERPL-SCNC: 137 MMOL/L — SIGNIFICANT CHANGE UP (ref 135–145)
SODIUM SERPL-SCNC: 141 MMOL/L — SIGNIFICANT CHANGE UP (ref 135–145)
WBC # BLD: 8.36 K/UL — SIGNIFICANT CHANGE UP (ref 3.8–10.5)
WBC # FLD AUTO: 8.36 K/UL — SIGNIFICANT CHANGE UP (ref 3.8–10.5)

## 2019-07-24 PROCEDURE — 71045 X-RAY EXAM CHEST 1 VIEW: CPT | Mod: 26

## 2019-07-24 PROCEDURE — 99291 CRITICAL CARE FIRST HOUR: CPT

## 2019-07-24 PROCEDURE — 99233 SBSQ HOSP IP/OBS HIGH 50: CPT

## 2019-07-24 RX ORDER — POTASSIUM CHLORIDE 20 MEQ
40 PACKET (EA) ORAL ONCE
Refills: 0 | Status: COMPLETED | OUTPATIENT
Start: 2019-07-24 | End: 2019-07-24

## 2019-07-24 RX ORDER — MIDAZOLAM HYDROCHLORIDE 1 MG/ML
4 INJECTION, SOLUTION INTRAMUSCULAR; INTRAVENOUS EVERY 4 HOURS
Refills: 0 | Status: DISCONTINUED | OUTPATIENT
Start: 2019-07-24 | End: 2019-07-25

## 2019-07-24 RX ORDER — PROPOFOL 10 MG/ML
50 INJECTION, EMULSION INTRAVENOUS
Qty: 1000 | Refills: 0 | Status: DISCONTINUED | OUTPATIENT
Start: 2019-07-24 | End: 2019-07-25

## 2019-07-24 RX ORDER — MIDAZOLAM HYDROCHLORIDE 1 MG/ML
2 INJECTION, SOLUTION INTRAMUSCULAR; INTRAVENOUS ONCE
Refills: 0 | Status: DISCONTINUED | OUTPATIENT
Start: 2019-07-24 | End: 2019-07-24

## 2019-07-24 RX ORDER — FUROSEMIDE 40 MG
40 TABLET ORAL ONCE
Refills: 0 | Status: COMPLETED | OUTPATIENT
Start: 2019-07-24 | End: 2019-07-24

## 2019-07-24 RX ADMIN — PROPOFOL 31.41 MICROGRAM(S)/KG/MIN: 10 INJECTION, EMULSION INTRAVENOUS at 18:24

## 2019-07-24 RX ADMIN — ENOXAPARIN SODIUM 40 MILLIGRAM(S): 100 INJECTION SUBCUTANEOUS at 11:03

## 2019-07-24 RX ADMIN — Medication 100 MILLIGRAM(S): at 05:16

## 2019-07-24 RX ADMIN — Medication 100 MILLIGRAM(S): at 18:01

## 2019-07-24 RX ADMIN — FENTANYL CITRATE 5.24 MICROGRAM(S)/KG/HR: 50 INJECTION INTRAVENOUS at 18:24

## 2019-07-24 RX ADMIN — DEXMEDETOMIDINE HYDROCHLORIDE IN 0.9% SODIUM CHLORIDE 5.24 MICROGRAM(S)/KG/HR: 4 INJECTION INTRAVENOUS at 03:41

## 2019-07-24 RX ADMIN — MEROPENEM 100 MILLIGRAM(S): 1 INJECTION INTRAVENOUS at 14:29

## 2019-07-24 RX ADMIN — Medication 1 PATCH: at 11:03

## 2019-07-24 RX ADMIN — DEXMEDETOMIDINE HYDROCHLORIDE IN 0.9% SODIUM CHLORIDE 5.24 MICROGRAM(S)/KG/HR: 4 INJECTION INTRAVENOUS at 00:25

## 2019-07-24 RX ADMIN — MIDAZOLAM HYDROCHLORIDE 4 MILLIGRAM(S): 1 INJECTION, SOLUTION INTRAMUSCULAR; INTRAVENOUS at 18:43

## 2019-07-24 RX ADMIN — DEXMEDETOMIDINE HYDROCHLORIDE IN 0.9% SODIUM CHLORIDE 5.24 MICROGRAM(S)/KG/HR: 4 INJECTION INTRAVENOUS at 11:34

## 2019-07-24 RX ADMIN — Medication 3 MILLILITER(S): at 05:40

## 2019-07-24 RX ADMIN — DEXMEDETOMIDINE HYDROCHLORIDE IN 0.9% SODIUM CHLORIDE 5.24 MICROGRAM(S)/KG/HR: 4 INJECTION INTRAVENOUS at 20:30

## 2019-07-24 RX ADMIN — Medication 40 MILLIEQUIVALENT(S): at 09:10

## 2019-07-24 RX ADMIN — DEXMEDETOMIDINE HYDROCHLORIDE IN 0.9% SODIUM CHLORIDE 5.24 MICROGRAM(S)/KG/HR: 4 INJECTION INTRAVENOUS at 14:44

## 2019-07-24 RX ADMIN — FENTANYL CITRATE 5.24 MICROGRAM(S)/KG/HR: 50 INJECTION INTRAVENOUS at 00:25

## 2019-07-24 RX ADMIN — CISATRACURIUM BESYLATE 18.85 MICROGRAM(S)/KG/MIN: 2 INJECTION INTRAVENOUS at 00:24

## 2019-07-24 RX ADMIN — Medication 3 MILLILITER(S): at 17:57

## 2019-07-24 RX ADMIN — Medication 102 MILLIGRAM(S): at 18:00

## 2019-07-24 RX ADMIN — Medication 3 MILLILITER(S): at 01:09

## 2019-07-24 RX ADMIN — PROPOFOL 31.41 MICROGRAM(S)/KG/MIN: 10 INJECTION, EMULSION INTRAVENOUS at 03:03

## 2019-07-24 RX ADMIN — Medication 153 MILLIGRAM(S): at 22:45

## 2019-07-24 RX ADMIN — MEROPENEM 100 MILLIGRAM(S): 1 INJECTION INTRAVENOUS at 05:17

## 2019-07-24 RX ADMIN — PANTOPRAZOLE SODIUM 40 MILLIGRAM(S): 20 TABLET, DELAYED RELEASE ORAL at 11:03

## 2019-07-24 RX ADMIN — Medication 153 MILLIGRAM(S): at 12:00

## 2019-07-24 RX ADMIN — Medication 3 MILLILITER(S): at 12:04

## 2019-07-24 RX ADMIN — Medication 40 MILLIGRAM(S): at 02:11

## 2019-07-24 RX ADMIN — PROPOFOL 31.41 MICROGRAM(S)/KG/MIN: 10 INJECTION, EMULSION INTRAVENOUS at 14:44

## 2019-07-24 RX ADMIN — FENTANYL CITRATE 5.24 MICROGRAM(S)/KG/HR: 50 INJECTION INTRAVENOUS at 11:29

## 2019-07-24 RX ADMIN — CHLORHEXIDINE GLUCONATE 1 APPLICATION(S): 213 SOLUTION TOPICAL at 06:21

## 2019-07-24 RX ADMIN — CISATRACURIUM BESYLATE 18.85 MICROGRAM(S)/KG/MIN: 2 INJECTION INTRAVENOUS at 08:36

## 2019-07-24 RX ADMIN — PROPOFOL 31.41 MICROGRAM(S)/KG/MIN: 10 INJECTION, EMULSION INTRAVENOUS at 06:18

## 2019-07-24 RX ADMIN — PROPOFOL 31.41 MICROGRAM(S)/KG/MIN: 10 INJECTION, EMULSION INTRAVENOUS at 09:41

## 2019-07-24 RX ADMIN — Medication 153 MILLIGRAM(S): at 05:16

## 2019-07-24 RX ADMIN — CHLORHEXIDINE GLUCONATE 15 MILLILITER(S): 213 SOLUTION TOPICAL at 18:00

## 2019-07-24 RX ADMIN — DEXMEDETOMIDINE HYDROCHLORIDE IN 0.9% SODIUM CHLORIDE 5.24 MICROGRAM(S)/KG/HR: 4 INJECTION INTRAVENOUS at 08:37

## 2019-07-24 RX ADMIN — Medication 153 MILLIGRAM(S): at 16:11

## 2019-07-24 RX ADMIN — CHLORHEXIDINE GLUCONATE 15 MILLILITER(S): 213 SOLUTION TOPICAL at 05:17

## 2019-07-24 RX ADMIN — DEXMEDETOMIDINE HYDROCHLORIDE IN 0.9% SODIUM CHLORIDE 5.24 MICROGRAM(S)/KG/HR: 4 INJECTION INTRAVENOUS at 18:24

## 2019-07-24 RX ADMIN — PROPOFOL 31.41 MICROGRAM(S)/KG/MIN: 10 INJECTION, EMULSION INTRAVENOUS at 08:36

## 2019-07-24 RX ADMIN — Medication 102 MILLIGRAM(S): at 05:16

## 2019-07-24 RX ADMIN — PROPOFOL 31.41 MICROGRAM(S)/KG/MIN: 10 INJECTION, EMULSION INTRAVENOUS at 11:56

## 2019-07-24 RX ADMIN — MIDAZOLAM HYDROCHLORIDE 2 MILLIGRAM(S): 1 INJECTION, SOLUTION INTRAMUSCULAR; INTRAVENOUS at 11:27

## 2019-07-24 RX ADMIN — DEXMEDETOMIDINE HYDROCHLORIDE IN 0.9% SODIUM CHLORIDE 5.24 MICROGRAM(S)/KG/HR: 4 INJECTION INTRAVENOUS at 09:41

## 2019-07-24 RX ADMIN — MIDAZOLAM HYDROCHLORIDE 2 MILLIGRAM(S): 1 INJECTION, SOLUTION INTRAMUSCULAR; INTRAVENOUS at 09:33

## 2019-07-24 RX ADMIN — Medication 100 MILLIGRAM(S): at 23:57

## 2019-07-24 RX ADMIN — Medication 1: at 06:20

## 2019-07-24 RX ADMIN — FENTANYL CITRATE 5.24 MICROGRAM(S)/KG/HR: 50 INJECTION INTRAVENOUS at 08:37

## 2019-07-24 RX ADMIN — Medication 1 PATCH: at 08:13

## 2019-07-24 RX ADMIN — DEXMEDETOMIDINE HYDROCHLORIDE IN 0.9% SODIUM CHLORIDE 5.24 MICROGRAM(S)/KG/HR: 4 INJECTION INTRAVENOUS at 07:15

## 2019-07-24 RX ADMIN — Medication 100 MILLIGRAM(S): at 11:03

## 2019-07-24 RX ADMIN — Medication 1 PATCH: at 11:02

## 2019-07-24 RX ADMIN — MEROPENEM 100 MILLIGRAM(S): 1 INJECTION INTRAVENOUS at 22:40

## 2019-07-24 RX ADMIN — AZITHROMYCIN 255 MILLIGRAM(S): 500 TABLET, FILM COATED ORAL at 13:23

## 2019-07-24 NOTE — PROGRESS NOTE ADULT - SUBJECTIVE AND OBJECTIVE BOX
HPI:  33 years old male here c/o fever nausea and non-bloody diarrhea with diffused abd pain since yesterday. Pt denies headache, dizziness, blurred vision, light sensitivities, focal/distal weakness or numbness, neck/back pain, cough, sob, chest pain, vomiting, dysuria, hematuria or irregular bowel movements. Pt also denies recent traveling.   CXR showed RLL infiltrates, Abd CT revealed RLL PNA, he was started on IVF, IV antibiotics. (19 Jul 2019 18:39)      24 hr events:  PEEP remains at 10 and fio2 50%  Remains supine  Paralytics stopped and monitor for vent dyssynchrony. Pt able to be aroused and opens eyes recognizing family and moving extremities    ## ROS:  [x ] unable to obtain as pt intubated/sedated      ## Labs:                         10.5   8.36  )-----------( 182      ( 24 Jul 2019 04:30 )             31.7   07-24    137  |  102  |  28<H>  ----------------------------<  165<H>  3.6   |  28  |  1.40<H>    Ca    8.0<L>      24 Jul 2019 04:30  Phos  4.0     07-24  Mg     2.6     07-24    TPro  6.0  /  Alb  1.9<L>  /  TBili  0.5  /  DBili  .36<H>  /  AST  32  /  ALT  32  /  AlkPhos  53  07-23    MEDICATIONS  (STANDING):  ALBUTerol/ipratropium for Nebulization 3 milliLiter(s) Nebulizer every 6 hours  ascorbic acid IVPB 1500 milliGRAM(s) IV Intermittent <User Schedule>  azithromycin  IVPB      azithromycin  IVPB 500 milliGRAM(s) IV Intermittent every 24 hours  chlorhexidine 0.12% Liquid 15 milliLiter(s) Oral Mucosa two times a day  chlorhexidine 4% Liquid 1 Application(s) Topical <User Schedule>  cisatracurium Infusion 3 MICROgram(s)/kG/Min (18.846 mL/Hr) IV Continuous <Continuous>  dexmedetomidine Infusion 0.2 MICROgram(s)/kG/Hr (5.235 mL/Hr) IV Continuous <Continuous>  dextrose 5%. 1000 milliLiter(s) (50 mL/Hr) IV Continuous <Continuous>  dextrose 50% Injectable 12.5 Gram(s) IV Push once  dextrose 50% Injectable 25 Gram(s) IV Push once  dextrose 50% Injectable 25 Gram(s) IV Push once  enoxaparin Injectable 40 milliGRAM(s) SubCutaneous daily  fentaNYL   Infusion. 0.5 MICROgram(s)/kG/Hr (5.235 mL/Hr) IV Continuous <Continuous>  hydrocortisone sodium succinate Injectable 100 milliGRAM(s) IV Push every 6 hours  insulin lispro (HumaLOG) corrective regimen sliding scale   SubCutaneous every 6 hours  levoFLOXacin IVPB 750 milliGRAM(s) IV Intermittent every 24 hours  meropenem  IVPB 1000 milliGRAM(s) IV Intermittent every 8 hours  nicotine -  14 mG/24Hr(s) Patch 1 patch Transdermal daily  norepinephrine Infusion 0.05 MICROgram(s)/kG/Min (4.908 mL/Hr) IV Continuous <Continuous>  pantoprazole   Suspension 40 milliGRAM(s) Oral daily  propofol Infusion 50 MICROgram(s)/kG/Min (31.41 mL/Hr) IV Continuous <Continuous>  thiamine IVPB 200 milliGRAM(s) IV Intermittent <User Schedule>                 ## Imaging:  bilateral airspace disease (coarse/hazy opacities). Small effusion. No PE    ICU Vital Signs Last 24 Hrs  T(C): 36.6 (24 Jul 2019 04:52), Max: 37 (23 Jul 2019 12:30)  T(F): 97.9 (24 Jul 2019 04:52), Max: 98.6 (23 Jul 2019 12:30)  HR: 59 (24 Jul 2019 12:15) (57 - 86)  BP: --  BP(mean): --  ABP: 134/72 (24 Jul 2019 12:15) (109/68 - 142/83)  ABP(mean): 125 (24 Jul 2019 12:15) (79 - 125)  RR: 22 (24 Jul 2019 12:15) (20 - 32)  SpO2: 99% (24 Jul 2019 12:15) (91% - 100%)      ## P/E:  Gen: intubated, supine positioning  HEENT: RADHA   Resp: b/l rhonchi  CVS: S1S2 no m/r/g  Abd: soft NT/ND +BS  Ext: no c/c/e  Neuro: sedated, intubated but on vacation noted to open eyes and move extremities b/l UE and LE

## 2019-07-24 NOTE — PROGRESS NOTE ADULT - SUBJECTIVE AND OBJECTIVE BOX
Patient is a 33y old  Male who presents with a chief complaint of Pneumonia (23 Jul 2019 15:54)      INTERVAL HPI / OVERNIGHT EVENTS:    MEDICATIONS  (STANDING):  ALBUTerol/ipratropium for Nebulization 3 milliLiter(s) Nebulizer every 6 hours  ascorbic acid IVPB 1500 milliGRAM(s) IV Intermittent <User Schedule>  azithromycin  IVPB      azithromycin  IVPB 500 milliGRAM(s) IV Intermittent every 24 hours  chlorhexidine 0.12% Liquid 15 milliLiter(s) Oral Mucosa two times a day  chlorhexidine 4% Liquid 1 Application(s) Topical <User Schedule>  cisatracurium Infusion 3 MICROgram(s)/kG/Min (18.846 mL/Hr) IV Continuous <Continuous>  dexmedetomidine Infusion 0.2 MICROgram(s)/kG/Hr (5.235 mL/Hr) IV Continuous <Continuous>  dextrose 5%. 1000 milliLiter(s) (50 mL/Hr) IV Continuous <Continuous>  dextrose 50% Injectable 12.5 Gram(s) IV Push once  dextrose 50% Injectable 25 Gram(s) IV Push once  dextrose 50% Injectable 25 Gram(s) IV Push once  enoxaparin Injectable 40 milliGRAM(s) SubCutaneous daily  fentaNYL   Infusion. 0.5 MICROgram(s)/kG/Hr (5.235 mL/Hr) IV Continuous <Continuous>  hydrocortisone sodium succinate Injectable 100 milliGRAM(s) IV Push every 6 hours  insulin lispro (HumaLOG) corrective regimen sliding scale   SubCutaneous every 6 hours  levoFLOXacin IVPB 750 milliGRAM(s) IV Intermittent every 24 hours  meropenem  IVPB 1000 milliGRAM(s) IV Intermittent every 8 hours  nicotine -  14 mG/24Hr(s) Patch 1 patch Transdermal daily  norepinephrine Infusion 0.05 MICROgram(s)/kG/Min (4.908 mL/Hr) IV Continuous <Continuous>  pantoprazole   Suspension 40 milliGRAM(s) Oral daily  propofol Infusion 50 MICROgram(s)/kG/Min (31.41 mL/Hr) IV Continuous <Continuous>  thiamine IVPB 200 milliGRAM(s) IV Intermittent <User Schedule>    MEDICATIONS  (PRN):  acetaminophen   Tablet .. 650 milliGRAM(s) Oral every 6 hours PRN Temp greater or equal to 38C (100.4F), Mild Pain (1 - 3)  dextrose 40% Gel 15 Gram(s) Oral once PRN Blood Glucose LESS THAN 70 milliGRAM(s)/deciliter  glucagon  Injectable 1 milliGRAM(s) IntraMuscular once PRN Glucose LESS THAN 70 milligrams/deciliter  guaiFENesin    Syrup 200 milliGRAM(s) Oral every 6 hours PRN Cough  midazolam Injectable 4 milliGRAM(s) IV Push every 4 hours PRN agitation      Vital Signs Last 24 Hrs  T(C): 36.6 (24 Jul 2019 04:52), Max: 36.9 (23 Jul 2019 19:15)  T(F): 97.9 (24 Jul 2019 04:52), Max: 98.5 (23 Jul 2019 19:15)  HR: 58 (24 Jul 2019 15:00) (57 - 86)  BP: --  BP(mean): --  RR: 19 (24 Jul 2019 15:00) (19 - 32)  SpO2: 98% (24 Jul 2019 15:00) (91% - 100%)    Review of systems:  General : no fever /chills,fatigue  CVS : no chest pain, palpitations  Lungs : no shortness of breath, cough  GI : no abdominal pain,vomiting, diarrhea   : no dysuria,hematuria        PHYSICAL EXAM:  General :NAD  Constitutional:  well-groomed, well-developed  Respiratory: CTAB/L  Cardiovascular: S1 and S2, RRR, no M/G/R  Gastrointestinal: BS+, soft, NT/ND  Extremities: No peripheral edema  Vascular: 2+ peripheral pulses  Skin: No rashes      LABS:                        10.5   8.36  )-----------( 182      ( 24 Jul 2019 04:30 )             31.7     07-24    141  |  102  |  30<H>  ----------------------------<  130<H>  3.8   |  29  |  1.48<H>    Ca    7.8<L>      24 Jul 2019 12:12  Phos  4.0     07-24  Mg     2.6     07-24    TPro  6.0  /  Alb  1.9<L>  /  TBili  0.5  /  DBili  .36<H>  /  AST  32  /  ALT  32  /  AlkPhos  53  07-23          MICROBIOLOGY:  RECENT CULTURES:  07-21 Bronch Wash XXXX   Few polymorphonuclear leukocytes per low power field  No squamous epithelial cells per low power field  No organisms seen   Normal Respiratory Marie present    07-20 .Stool XXXX XXXX   No enteric pathogens isolated.  (Stool culture examined for Salmonella,  Shigella, Campylobacter, Aeromonas, Plesiomonas,  Vibrio, E.coli O157 and Yersinia)    07-20 .Blood XXXX XXXX   No growth to date.          RADIOLOGY & ADDITIONAL STUDIES: Patient is a 33y old  Male who presents with a chief complaint of Pneumonia (23 Jul 2019 15:54)      INTERVAL HPI / OVERNIGHT EVENTS: No new event ,intubated and sedated    MEDICATIONS  (STANDING):  ALBUTerol/ipratropium for Nebulization 3 milliLiter(s) Nebulizer every 6 hours  ascorbic acid IVPB 1500 milliGRAM(s) IV Intermittent <User Schedule>  azithromycin  IVPB      azithromycin  IVPB 500 milliGRAM(s) IV Intermittent every 24 hours  chlorhexidine 0.12% Liquid 15 milliLiter(s) Oral Mucosa two times a day  chlorhexidine 4% Liquid 1 Application(s) Topical <User Schedule>  cisatracurium Infusion 3 MICROgram(s)/kG/Min (18.846 mL/Hr) IV Continuous <Continuous>  dexmedetomidine Infusion 0.2 MICROgram(s)/kG/Hr (5.235 mL/Hr) IV Continuous <Continuous>  dextrose 5%. 1000 milliLiter(s) (50 mL/Hr) IV Continuous <Continuous>  dextrose 50% Injectable 12.5 Gram(s) IV Push once  dextrose 50% Injectable 25 Gram(s) IV Push once  dextrose 50% Injectable 25 Gram(s) IV Push once  enoxaparin Injectable 40 milliGRAM(s) SubCutaneous daily  fentaNYL   Infusion. 0.5 MICROgram(s)/kG/Hr (5.235 mL/Hr) IV Continuous <Continuous>  hydrocortisone sodium succinate Injectable 100 milliGRAM(s) IV Push every 6 hours  insulin lispro (HumaLOG) corrective regimen sliding scale   SubCutaneous every 6 hours  levoFLOXacin IVPB 750 milliGRAM(s) IV Intermittent every 24 hours  meropenem  IVPB 1000 milliGRAM(s) IV Intermittent every 8 hours  nicotine -  14 mG/24Hr(s) Patch 1 patch Transdermal daily  norepinephrine Infusion 0.05 MICROgram(s)/kG/Min (4.908 mL/Hr) IV Continuous <Continuous>  pantoprazole   Suspension 40 milliGRAM(s) Oral daily  propofol Infusion 50 MICROgram(s)/kG/Min (31.41 mL/Hr) IV Continuous <Continuous>  thiamine IVPB 200 milliGRAM(s) IV Intermittent <User Schedule>    MEDICATIONS  (PRN):  acetaminophen   Tablet .. 650 milliGRAM(s) Oral every 6 hours PRN Temp greater or equal to 38C (100.4F), Mild Pain (1 - 3)  dextrose 40% Gel 15 Gram(s) Oral once PRN Blood Glucose LESS THAN 70 milliGRAM(s)/deciliter  glucagon  Injectable 1 milliGRAM(s) IntraMuscular once PRN Glucose LESS THAN 70 milligrams/deciliter  guaiFENesin    Syrup 200 milliGRAM(s) Oral every 6 hours PRN Cough  midazolam Injectable 4 milliGRAM(s) IV Push every 4 hours PRN agitation      Vital Signs Last 24 Hrs  T(C): 36.6 (24 Jul 2019 04:52), Max: 36.9 (23 Jul 2019 19:15)  T(F): 97.9 (24 Jul 2019 04:52), Max: 98.5 (23 Jul 2019 19:15)  HR: 58 (24 Jul 2019 15:00) (57 - 86)  BP: --  BP(mean): --  RR: 19 (24 Jul 2019 15:00) (19 - 32)  SpO2: 98% (24 Jul 2019 15:00) (91% - 100%)    Review of systems:  cant be obtained      PHYSICAL EXAM:  General :NAD.sedated  Constitutional:  well-groomed, well-developed  Respiratory: CTAB/L,on vent support  Cardiovascular: S1 and S2, RRR, no M/G/R  Gastrointestinal: BS+, soft, NT/ND  Extremities: No peripheral edema  Vascular: 2+ peripheral pulses  Skin: No rashes      LABS:                        10.5   8.36  )-----------( 182      ( 24 Jul 2019 04:30 )             31.7     07-24    141  |  102  |  30<H>  ----------------------------<  130<H>  3.8   |  29  |  1.48<H>    Ca    7.8<L>      24 Jul 2019 12:12  Phos  4.0     07-24  Mg     2.6     07-24    TPro  6.0  /  Alb  1.9<L>  /  TBili  0.5  /  DBili  .36<H>  /  AST  32  /  ALT  32  /  AlkPhos  53  07-23          MICROBIOLOGY:  RECENT CULTURES:  07-21 Bronch Wash XXXX   Few polymorphonuclear leukocytes per low power field  No squamous epithelial cells per low power field  No organisms seen   Normal Respiratory Marie present    07-20 .Stool XXXX XXXX   No enteric pathogens isolated.  (Stool culture examined for Salmonella,  Shigella, Campylobacter, Aeromonas, Plesiomonas,  Vibrio, E.coli O157 and Yersinia)    07-20 .Blood XXXX XXXX   No growth to date.          RADIOLOGY & ADDITIONAL STUDIES:

## 2019-07-24 NOTE — PROGRESS NOTE ADULT - PROBLEM SELECTOR PLAN 5
resolving   sec to legionella   all other infectious w/u neg
- resolved.  - likely from dehydration  - IVF given.
sec to antibiotics /septic shock   dced vanco   monitor cr cl

## 2019-07-24 NOTE — PROGRESS NOTE ADULT - SUBJECTIVE AND OBJECTIVE BOX
INTERVAL HPI:   33 years old male with no significant medical history here c/o fever nausea and non-bloody diarrhea with diffused abd pain for one day. Started about 4 days prior to that with right lower chest, back and knee pain along with profuse diarrhea. Also with diffuse body ache, sweating and chills. Then started with sob.  Admitted with RLL pneumonia.  07/20/19: With increasing Respiratory distress, temperature up to 104 with chills. RRT was called, got started on BIPAP support with some improvement.   Active smoker 1 ppd. uses alcohol and Marijuana socially.  07/21/19:  Transferred to ICU due to worsening Respiratory status.  Got intubated, on vent with high FIO2 and peep.  07/22/19: Legionella urine antigen positive.    OVERNIGHT EVENTS:  Remains in ICU, dependent on Vent , off pressor support. sedated    Vital Signs Last 24 Hrs  T(C): 37.1 (24 Jul 2019 18:04), Max: 37.1 (24 Jul 2019 16:13)  T(F): 98.7 (24 Jul 2019 18:04), Max: 98.8 (24 Jul 2019 16:13)  HR: 64 (24 Jul 2019 19:07) (57 - 92)  BP: --  BP(mean): --  RR: 22 (24 Jul 2019 19:07) (19 - 32)  SpO2: 98% (24 Jul 2019 19:07) (91% - 100%)    Mode: AC/ CMV (Assist Control/ Continuous Mandatory Ventilation)  RR (machine): 22  TV (machine): 450  FiO2: 40  PEEP: 8  ITime: 0.9  MAP: 12  PIP: 26    PHYSICAL EXAM:  GEN:        comfortable, sedated  HEENT:    Ett.    RESP:       no distress.    CVS:        Regular rate and rhythm.   ABD:         Soft, non-tender, non-distended;     MEDICATIONS  (STANDING):  ALBUTerol/ipratropium for Nebulization 3 milliLiter(s) Nebulizer every 6 hours  ascorbic acid IVPB 1500 milliGRAM(s) IV Intermittent <User Schedule>  azithromycin  IVPB      azithromycin  IVPB 500 milliGRAM(s) IV Intermittent every 24 hours  chlorhexidine 0.12% Liquid 15 milliLiter(s) Oral Mucosa two times a day  chlorhexidine 4% Liquid 1 Application(s) Topical <User Schedule>  dexmedetomidine Infusion 0.2 MICROgram(s)/kG/Hr (5.235 mL/Hr) IV Continuous <Continuous>  dextrose 5%. 1000 milliLiter(s) (50 mL/Hr) IV Continuous <Continuous>  dextrose 50% Injectable 12.5 Gram(s) IV Push once  dextrose 50% Injectable 25 Gram(s) IV Push once  dextrose 50% Injectable 25 Gram(s) IV Push once  enoxaparin Injectable 40 milliGRAM(s) SubCutaneous daily  fentaNYL   Infusion. 0.5 MICROgram(s)/kG/Hr (5.235 mL/Hr) IV Continuous <Continuous>  hydrocortisone sodium succinate Injectable 100 milliGRAM(s) IV Push every 6 hours  insulin lispro (HumaLOG) corrective regimen sliding scale   SubCutaneous every 6 hours  levoFLOXacin IVPB 750 milliGRAM(s) IV Intermittent every 24 hours  meropenem  IVPB 1000 milliGRAM(s) IV Intermittent every 8 hours  nicotine -  14 mG/24Hr(s) Patch 1 patch Transdermal daily  pantoprazole   Suspension 40 milliGRAM(s) Oral daily  propofol Infusion 50 MICROgram(s)/kG/Min (31.41 mL/Hr) IV Continuous <Continuous>    MEDICATIONS  (PRN):  acetaminophen   Tablet .. 650 milliGRAM(s) Oral every 6 hours PRN Temp greater or equal to 38C (100.4F), Mild Pain (1 - 3)  dextrose 40% Gel 15 Gram(s) Oral once PRN Blood Glucose LESS THAN 70 milliGRAM(s)/deciliter  glucagon  Injectable 1 milliGRAM(s) IntraMuscular once PRN Glucose LESS THAN 70 milligrams/deciliter  guaiFENesin    Syrup 200 milliGRAM(s) Oral every 6 hours PRN Cough  midazolam Injectable 4 milliGRAM(s) IV Push every 4 hours PRN agitation    LABS:                        10.5   8.36  )-----------( 182      ( 24 Jul 2019 04:30 )             31.7     07-24    141  |  102  |  30<H>  ----------------------------<  130<H>  3.8   |  29  |  1.48<H>    Ca    7.8<L>      24 Jul 2019 12:12  Phos  4.0     07-24  Mg     2.6     07-24    TPro  6.0  /  Alb  1.9<L>  /  TBili  0.5  /  DBili  .36<H>  /  AST  32  /  ALT  32  /  AlkPhos  53  07-23    PT/INR - ( 23 Jul 2019 03:12 )   PT: 13.4 sec;   INR: 1.19 ratio      PTT - ( 23 Jul 2019 03:12 )  PTT:26.8 sec  07-24 @ 08:36  pH: 7.35  pCO2: 54  pO2: 80  SaO2: 95  07-23 @ 11:57  pH: 7.37  pCO2: 43  pO2: 111  SaO2: 98  07-23 @ 07:54  pH: 7.45  pCO2: 34  pO2: 115  SaO2: 99  07-22 @ 20:04  pH: 7.41  pCO2: 39  pO2: 78  SaO2: 96  07-22 @ 09:05  pH: 7.38  pCO2: 39  pO2: 95  SaO2: 98  07-22 @ 02:20  pH: 7.37  pCO2: 33  pO2: 116  SaO2: 99  07-21 @ 21:32  pH: 7.37  pCO2: 39  pO2: 86  SaO2: 97  07-21 @ 21:16  pH: 7.36  pCO2: 41  pO2: 62  SaO2: 92  07-21 @ 17:30  pH: 7.29  pCO2: 49  pO2: 102  SaO2: 97  07-21 @ 06:17  pH: 7.46  pCO2: 31  pO2: 82  SaO2: 97  07-20 @ 15:06  pH: 7.49  pCO2: 25  pO2: 70  SaO2: 96    ASSESSMENT AND PLAN:  ·	Acute Hypoxic Respiratory failure.  ·	Legionella Pneumonia.  ·	ARDS.  ·	Sepsis.  ·	Diarrhea.  ·	Hypokalemia better.  ·	Tobacco abuse.  ·	Obesity.    Reduce peep, continue peep.  Continue antibiotics.  CPAP trial in am.

## 2019-07-24 NOTE — PROGRESS NOTE ADULT - ASSESSMENT
32 yo M p/w PNA, ARDS and currently intubated and in prone positioning. Now + for legionella Ag. Slowly improving w/ clearing CXR and decreasing vent parameters. Attempt to stop paralytics but will need to closely monitor as had significant difficulty with ventilator dyssynchrony.    Neuro  cont sedation with propofol and fentanyl and precedex  will d/c nimbex gtt (but restart if needed for ventilator dyssynchrony)       Pulm  ARDS from legionella PNA s/p prone ventilation but remained supine now because P/F ratio significantly improved   plateau pressure remain decreased to 29  cont low tidal volume ventilation  cont decrease fio2 and cont to decrease peep to 10  cont abx w/ broad spectrum and double coverage for legionella  d/c vanco  f/u BAL cx and cell count -c/w neutrophilic alveolitis  f/u other autoimmune w/u and fungal w/u - so far not significantly positive  pt also utox +ve for THC and family reports cocaine use so unclear if these are contributing etiologies  HIV negative but cd4 low range     CVS  was on pressors from sepsis and sedation related reasons but now off  off levophed  cont Vit C protocol but consider d/c tomorrow if remains hemodynamically stable    Renal  STAN improving again w/ strong diuresis after lasix dose o/n   cont to monitor UO       GI  cont tube feeding        Critical Care Time 50min

## 2019-07-25 DIAGNOSIS — D72.810 LYMPHOCYTOPENIA: ICD-10-CM

## 2019-07-25 LAB
ANION GAP SERPL CALC-SCNC: 9 MMOL/L — SIGNIFICANT CHANGE UP (ref 5–17)
BASE EXCESS BLDA CALC-SCNC: 7.1 MMOL/L — HIGH (ref -2–2)
BLOOD GAS COMMENTS: SIGNIFICANT CHANGE UP
BLOOD GAS SOURCE: SIGNIFICANT CHANGE UP
BUN SERPL-MCNC: 36 MG/DL — HIGH (ref 7–23)
CALCIUM SERPL-MCNC: 7.8 MG/DL — LOW (ref 8.5–10.1)
CHLORIDE SERPL-SCNC: 102 MMOL/L — SIGNIFICANT CHANGE UP (ref 96–108)
CO2 SERPL-SCNC: 30 MMOL/L — SIGNIFICANT CHANGE UP (ref 22–31)
CREAT SERPL-MCNC: 1.44 MG/DL — HIGH (ref 0.5–1.3)
CULTURE RESULTS: SIGNIFICANT CHANGE UP
CULTURE RESULTS: SIGNIFICANT CHANGE UP
GBM IGG SER-ACNC: <1 AI — SIGNIFICANT CHANGE UP
GLUCOSE BLDC GLUCOMTR-MCNC: 106 MG/DL — HIGH (ref 70–99)
GLUCOSE BLDC GLUCOMTR-MCNC: 108 MG/DL — HIGH (ref 70–99)
GLUCOSE BLDC GLUCOMTR-MCNC: 118 MG/DL — HIGH (ref 70–99)
GLUCOSE BLDC GLUCOMTR-MCNC: 140 MG/DL — HIGH (ref 70–99)
GLUCOSE BLDC GLUCOMTR-MCNC: 142 MG/DL — HIGH (ref 70–99)
GLUCOSE SERPL-MCNC: 121 MG/DL — HIGH (ref 70–99)
HCO3 BLDA-SCNC: 32 MMOL/L — HIGH (ref 21–29)
HCT VFR BLD CALC: 33.2 % — LOW (ref 39–50)
HGB BLD-MCNC: 10.9 G/DL — LOW (ref 13–17)
HOROWITZ INDEX BLDA+IHG-RTO: SIGNIFICANT CHANGE UP
HTLV I+II AB PATRN SER RIPA-IMP: SIGNIFICANT CHANGE UP
M PNEUMO IGM SER-ACNC: <20 UNITS/ML — SIGNIFICANT CHANGE UP
MAGNESIUM SERPL-MCNC: 2.4 MG/DL — SIGNIFICANT CHANGE UP (ref 1.6–2.6)
MCHC RBC-ENTMCNC: 28.8 PG — SIGNIFICANT CHANGE UP (ref 27–34)
MCHC RBC-ENTMCNC: 32.8 GM/DL — SIGNIFICANT CHANGE UP (ref 32–36)
MCV RBC AUTO: 87.8 FL — SIGNIFICANT CHANGE UP (ref 80–100)
MYCOPLASMA AG SPEC QL: NEGATIVE — SIGNIFICANT CHANGE UP
NON-GYNECOLOGICAL CYTOLOGY STUDY: SIGNIFICANT CHANGE UP
NRBC # BLD: 0 /100 WBCS — SIGNIFICANT CHANGE UP (ref 0–0)
PCO2 BLDA: 49 MMHG — HIGH (ref 32–46)
PH BLD: 7.43 — SIGNIFICANT CHANGE UP (ref 7.35–7.45)
PHOSPHATE SERPL-MCNC: 2.6 MG/DL — SIGNIFICANT CHANGE UP (ref 2.5–4.5)
PLATELET # BLD AUTO: 294 K/UL — SIGNIFICANT CHANGE UP (ref 150–400)
PO2 BLDA: 111 MMHG — HIGH (ref 74–108)
POTASSIUM SERPL-MCNC: 3 MMOL/L — LOW (ref 3.5–5.3)
POTASSIUM SERPL-SCNC: 3 MMOL/L — LOW (ref 3.5–5.3)
RBC # BLD: 3.78 M/UL — LOW (ref 4.2–5.8)
RBC # FLD: 15 % — HIGH (ref 10.3–14.5)
SAO2 % BLDA: 98 % — HIGH (ref 92–96)
SODIUM SERPL-SCNC: 141 MMOL/L — SIGNIFICANT CHANGE UP (ref 135–145)
SPECIMEN SOURCE: SIGNIFICANT CHANGE UP
SPECIMEN SOURCE: SIGNIFICANT CHANGE UP
WBC # BLD: 9.36 K/UL — SIGNIFICANT CHANGE UP (ref 3.8–10.5)
WBC # FLD AUTO: 9.36 K/UL — SIGNIFICANT CHANGE UP (ref 3.8–10.5)

## 2019-07-25 PROCEDURE — 99291 CRITICAL CARE FIRST HOUR: CPT

## 2019-07-25 PROCEDURE — 71045 X-RAY EXAM CHEST 1 VIEW: CPT | Mod: 26

## 2019-07-25 PROCEDURE — 99232 SBSQ HOSP IP/OBS MODERATE 35: CPT

## 2019-07-25 RX ORDER — HYDROCORTISONE 20 MG
50 TABLET ORAL EVERY 8 HOURS
Refills: 0 | Status: DISCONTINUED | OUTPATIENT
Start: 2019-07-25 | End: 2019-07-26

## 2019-07-25 RX ORDER — POTASSIUM CHLORIDE 20 MEQ
10 PACKET (EA) ORAL
Refills: 0 | Status: COMPLETED | OUTPATIENT
Start: 2019-07-25 | End: 2019-07-25

## 2019-07-25 RX ORDER — POTASSIUM CHLORIDE 20 MEQ
40 PACKET (EA) ORAL ONCE
Refills: 0 | Status: COMPLETED | OUTPATIENT
Start: 2019-07-25 | End: 2019-07-25

## 2019-07-25 RX ADMIN — Medication 1 PATCH: at 07:44

## 2019-07-25 RX ADMIN — Medication 100 MILLIEQUIVALENT(S): at 08:54

## 2019-07-25 RX ADMIN — Medication 1 PATCH: at 22:57

## 2019-07-25 RX ADMIN — DEXMEDETOMIDINE HYDROCHLORIDE IN 0.9% SODIUM CHLORIDE 5.24 MICROGRAM(S)/KG/HR: 4 INJECTION INTRAVENOUS at 03:36

## 2019-07-25 RX ADMIN — Medication 1 PATCH: at 11:34

## 2019-07-25 RX ADMIN — Medication 100 MILLIGRAM(S): at 06:23

## 2019-07-25 RX ADMIN — Medication 3 MILLILITER(S): at 05:49

## 2019-07-25 RX ADMIN — PROPOFOL 31.41 MICROGRAM(S)/KG/MIN: 10 INJECTION, EMULSION INTRAVENOUS at 06:22

## 2019-07-25 RX ADMIN — Medication 100 MILLIEQUIVALENT(S): at 07:52

## 2019-07-25 RX ADMIN — PANTOPRAZOLE SODIUM 40 MILLIGRAM(S): 20 TABLET, DELAYED RELEASE ORAL at 11:33

## 2019-07-25 RX ADMIN — AZITHROMYCIN 255 MILLIGRAM(S): 500 TABLET, FILM COATED ORAL at 13:08

## 2019-07-25 RX ADMIN — Medication 3 MILLILITER(S): at 00:57

## 2019-07-25 RX ADMIN — CHLORHEXIDINE GLUCONATE 1 APPLICATION(S): 213 SOLUTION TOPICAL at 04:30

## 2019-07-25 RX ADMIN — Medication 1 PATCH: at 11:40

## 2019-07-25 RX ADMIN — Medication 100 MILLIEQUIVALENT(S): at 09:57

## 2019-07-25 RX ADMIN — Medication 50 MILLIGRAM(S): at 13:08

## 2019-07-25 RX ADMIN — MIDAZOLAM HYDROCHLORIDE 4 MILLIGRAM(S): 1 INJECTION, SOLUTION INTRAMUSCULAR; INTRAVENOUS at 03:36

## 2019-07-25 RX ADMIN — PROPOFOL 31.41 MICROGRAM(S)/KG/MIN: 10 INJECTION, EMULSION INTRAVENOUS at 03:34

## 2019-07-25 RX ADMIN — ENOXAPARIN SODIUM 40 MILLIGRAM(S): 100 INJECTION SUBCUTANEOUS at 11:33

## 2019-07-25 RX ADMIN — Medication 40 MILLIEQUIVALENT(S): at 07:47

## 2019-07-25 RX ADMIN — DEXMEDETOMIDINE HYDROCHLORIDE IN 0.9% SODIUM CHLORIDE 5.24 MICROGRAM(S)/KG/HR: 4 INJECTION INTRAVENOUS at 06:23

## 2019-07-25 RX ADMIN — CHLORHEXIDINE GLUCONATE 15 MILLILITER(S): 213 SOLUTION TOPICAL at 06:23

## 2019-07-25 RX ADMIN — Medication 3 MILLILITER(S): at 11:12

## 2019-07-25 RX ADMIN — FENTANYL CITRATE 5.24 MICROGRAM(S)/KG/HR: 50 INJECTION INTRAVENOUS at 03:35

## 2019-07-25 RX ADMIN — Medication 50 MILLIGRAM(S): at 22:56

## 2019-07-25 RX ADMIN — MEROPENEM 100 MILLIGRAM(S): 1 INJECTION INTRAVENOUS at 06:23

## 2019-07-25 RX ADMIN — Medication 3 MILLILITER(S): at 17:49

## 2019-07-25 NOTE — AIRWAY REMOVAL NOTE  ADULT & PEDS - ARTIFICAL AIRWAY REMOVAL COMMENTS
successfully weaned and extubated, High flow nasal cannula S/B , tolerating nasal cannula 5l well, spo2 98%, HR 76, RR 23

## 2019-07-25 NOTE — PHYSICAL THERAPY INITIAL EVALUATION ADULT - ADDITIONAL COMMENTS
Pt rents a room in patient's sister's house. Pt ambulated without AD, I and I with all ADLs. Pt works as a cab driiver. Pt has no stairs to enter and no stairs to negotiate inside the house.

## 2019-07-25 NOTE — PHYSICAL THERAPY INITIAL EVALUATION ADULT - TRANSFER TRAINING, PT EVAL
Pt will independently perform sit to/from stand transfers without LOB using rolling walker by 1 week

## 2019-07-25 NOTE — PROGRESS NOTE ADULT - SUBJECTIVE AND OBJECTIVE BOX
INTERVAL HPI:   33 years old male with no significant medical history here c/o fever nausea and non-bloody diarrhea with diffused abd pain for one day. Started about 4 days prior to that with right lower chest, back and knee pain along with profuse diarrhea. Also with diffuse body ache, sweating and chills. Then started with sob.  Admitted with RLL pneumonia.  07/20/19: With increasing Respiratory distress, temperature up to 104 with chills. RRT was called, got started on BIPAP support with some improvement.   Active smoker 1 ppd. uses alcohol and Marijuana socially.  07/21/19:  Transferred to ICU due to worsening Respiratory status.  Got intubated, on vent with high FIO2 and peep.  07/22/19: Legionella urine antigen positive.  07/25/19: Extubated.    OVERNIGHT EVENTS:  On high flow nasal.    Vital Signs Last 24 Hrs  T(C): 37.1 (25 Jul 2019 11:15), Max: 37.3 (25 Jul 2019 08:25)  T(F): 98.7 (25 Jul 2019 11:15), Max: 99.1 (25 Jul 2019 08:25)  HR: 69 (25 Jul 2019 13:00) (51 - 92)  BP: --  BP(mean): --  RR: 21 (25 Jul 2019 13:00) (15 - 33)  SpO2: 98% (25 Jul 2019 13:00) (93% - 100%)    Mode: CPAP with PS  FiO2: 40  PEEP: 5  PS: 5    PHYSICAL EXAM:  GEN:         Awake, responsive and comfortable.  HEENT:    Normal.    RESP:       no distress.  CVS:          Regular rate and rhythm.   ABD:         Soft, non-tender, non-distended;     MEDICATIONS  (STANDING):  ALBUTerol/ipratropium for Nebulization 3 milliLiter(s) Nebulizer every 6 hours  azithromycin  IVPB      azithromycin  IVPB 500 milliGRAM(s) IV Intermittent every 24 hours  chlorhexidine 0.12% Liquid 15 milliLiter(s) Oral Mucosa two times a day  chlorhexidine 4% Liquid 1 Application(s) Topical <User Schedule>  dexmedetomidine Infusion 0.2 MICROgram(s)/kG/Hr (5.235 mL/Hr) IV Continuous <Continuous>  dextrose 5%. 1000 milliLiter(s) (50 mL/Hr) IV Continuous <Continuous>  dextrose 50% Injectable 12.5 Gram(s) IV Push once  dextrose 50% Injectable 25 Gram(s) IV Push once  dextrose 50% Injectable 25 Gram(s) IV Push once  enoxaparin Injectable 40 milliGRAM(s) SubCutaneous daily  fentaNYL   Infusion. 0.5 MICROgram(s)/kG/Hr (5.235 mL/Hr) IV Continuous <Continuous>  hydrocortisone sodium succinate Injectable 50 milliGRAM(s) IV Push every 8 hours  insulin lispro (HumaLOG) corrective regimen sliding scale   SubCutaneous every 6 hours  levoFLOXacin IVPB 750 milliGRAM(s) IV Intermittent every 24 hours  meropenem  IVPB 1000 milliGRAM(s) IV Intermittent every 8 hours  nicotine -  14 mG/24Hr(s) Patch 1 patch Transdermal daily  pantoprazole   Suspension 40 milliGRAM(s) Oral daily  propofol Infusion 50 MICROgram(s)/kG/Min (31.41 mL/Hr) IV Continuous <Continuous>    MEDICATIONS  (PRN):  acetaminophen   Tablet .. 650 milliGRAM(s) Oral every 6 hours PRN Temp greater or equal to 38C (100.4F), Mild Pain (1 - 3)  dextrose 40% Gel 15 Gram(s) Oral once PRN Blood Glucose LESS THAN 70 milliGRAM(s)/deciliter  glucagon  Injectable 1 milliGRAM(s) IntraMuscular once PRN Glucose LESS THAN 70 milligrams/deciliter  guaiFENesin    Syrup 200 milliGRAM(s) Oral every 6 hours PRN Cough  midazolam Injectable 4 milliGRAM(s) IV Push every 4 hours PRN agitation    LABS:                        10.9   9.36  )-----------( 294      ( 25 Jul 2019 04:14 )             33.2     07-25    141  |  102  |  36<H>  ----------------------------<  121<H>  3.0<L>   |  30  |  1.44<H>    Ca    7.8<L>      25 Jul 2019 04:14  Phos  2.6     07-25  Mg     2.4     07-25 07-25 @ 10:28  pH: 7.43  pCO2: 49  pO2: 111  SaO2: 98  07-24 @ 08:36  pH: 7.35  pCO2: 54  pO2: 80  SaO2: 95  07-23 @ 11:57  pH: 7.37  pCO2: 43  pO2: 111  SaO2: 98  07-23 @ 07:54  pH: 7.45  pCO2: 34  pO2: 115  SaO2: 99  07-22 @ 20:04  pH: 7.41  pCO2: 39  pO2: 78  SaO2: 96  07-22 @ 09:05  pH: 7.38  pCO2: 39  pO2: 95  SaO2: 98  07-22 @ 02:20  pH: 7.37  pCO2: 33  pO2: 116  SaO2: 99  07-21 @ 21:32  pH: 7.37  pCO2: 39  pO2: 86  SaO2: 97  07-21 @ 21:16  pH: 7.36  pCO2: 41  pO2: 62  SaO2: 92  07-21 @ 17:30  pH: 7.29  pCO2: 49  pO2: 102  SaO2: 97  07-21 @ 06:17  pH: 7.46  pCO2: 31  pO2: 82  SaO2: 97  07-20 @ 15:06  pH: 7.49  pCO2: 25  pO2: 70  SaO2: 96    ASSESSMENT AND PLAN:  ·	Acute Hypoxic Respiratory failure.  ·	Legionella Pneumonia.  ·	ARDS.  ·	Sepsis.  ·	Diarrhea.  ·	Hypokalemia better.  ·	Tobacco abuse.  ·	Obesity.    Continue high flow and antibiotics.

## 2019-07-25 NOTE — PROGRESS NOTE ADULT - SUBJECTIVE AND OBJECTIVE BOX
Patient is a 33y old  Male who presents with a chief complaint of Pneumonia (24 Jul 2019 15:47)      INTERVAL HPI / OVERNIGHT EVENTS:Pt extubated an hr ago     MEDICATIONS  (STANDING):  ALBUTerol/ipratropium for Nebulization 3 milliLiter(s) Nebulizer every 6 hours  azithromycin  IVPB      azithromycin  IVPB 500 milliGRAM(s) IV Intermittent every 24 hours  chlorhexidine 4% Liquid 1 Application(s) Topical <User Schedule>  dextrose 5%. 1000 milliLiter(s) (50 mL/Hr) IV Continuous <Continuous>  dextrose 50% Injectable 12.5 Gram(s) IV Push once  dextrose 50% Injectable 25 Gram(s) IV Push once  dextrose 50% Injectable 25 Gram(s) IV Push once  enoxaparin Injectable 40 milliGRAM(s) SubCutaneous daily  hydrocortisone sodium succinate Injectable 50 milliGRAM(s) IV Push every 8 hours  insulin lispro (HumaLOG) corrective regimen sliding scale   SubCutaneous every 6 hours  levoFLOXacin IVPB 750 milliGRAM(s) IV Intermittent every 24 hours  meropenem  IVPB 1000 milliGRAM(s) IV Intermittent every 8 hours  nicotine -  14 mG/24Hr(s) Patch 1 patch Transdermal daily  pantoprazole   Suspension 40 milliGRAM(s) Oral daily    MEDICATIONS  (PRN):  acetaminophen   Tablet .. 650 milliGRAM(s) Oral every 6 hours PRN Temp greater or equal to 38C (100.4F), Mild Pain (1 - 3)  dextrose 40% Gel 15 Gram(s) Oral once PRN Blood Glucose LESS THAN 70 milliGRAM(s)/deciliter  glucagon  Injectable 1 milliGRAM(s) IntraMuscular once PRN Glucose LESS THAN 70 milligrams/deciliter  guaiFENesin    Syrup 200 milliGRAM(s) Oral every 6 hours PRN Cough      Vital Signs Last 24 Hrs  T(C): 36.3 (25 Jul 2019 15:27), Max: 37.3 (25 Jul 2019 08:25)  T(F): 97.4 (25 Jul 2019 15:27), Max: 99.1 (25 Jul 2019 08:25)  HR: 69 (25 Jul 2019 13:00) (51 - 92)  BP: --  BP(mean): --  RR: 68 (25 Jul 2019 13:01) (15 - 68)  SpO2: 97% (25 Jul 2019 13:01) (93% - 100%)    Review of systems:  General : no fever /chills,fatigue  CVS : no chest pain, palpitations  Lungs : no shortness of breath, cough  GI : no abdominal pain,vomiting, diarrhea   : no dysuria,hematuria        PHYSICAL EXAM:  General :NAD  Constitutional:  well-groomed, well-developed  Respiratory: CTAB/L,on High flow oxygen  Cardiovascular: S1 and S2, RRR, no M/G/R  Gastrointestinal: BS+, soft, NT/ND  Extremities: No peripheral edema  Vascular: 2+ peripheral pulses  Skin: No rashes      LABS:                        10.9   9.36  )-----------( 294      ( 25 Jul 2019 04:14 )             33.2     07-25    141  |  102  |  36<H>  ----------------------------<  121<H>  3.0<L>   |  30  |  1.44<H>    Ca    7.8<L>      25 Jul 2019 04:14  Phos  2.6     07-25  Mg     2.4     07-25            MICROBIOLOGY:  RECENT CULTURES:  07-21 Bronch Wash XXXX   Few polymorphonuclear leukocytes per low power field  No squamous epithelial cells per low power field  No organisms seen   Normal Respiratory Marie present    07-20 .Stool XXXX XXXX   No enteric pathogens isolated.  (Stool culture examined for Salmonella,  Shigella, Campylobacter, Aeromonas, Plesiomonas,  Vibrio, E.coli O157 and Yersinia)    07-20 .Blood XXXX XXXX   No growth at 5 days.          RADIOLOGY & ADDITIONAL STUDIES:

## 2019-07-25 NOTE — PROGRESS NOTE ADULT - ASSESSMENT
34 yo M p/w PNA, ARDS and currently intubated and in prone positioning. Now + for legionella Ag. Slowly improving w/ clearing CXR and decreasing vent parameters. Doing well on PS trials and extubated to [facility]. Cont to monitor    Neuro  cont sedation with propofol and fentanyl and precedex  will d/c nimbex gtt (but restart if needed for ventilator dyssynchrony)       Pulm  ARDS from legionella PNA s/p prone ventilation but remained supine now because P/F ratio significantly improved   plateau pressure remain decreased to 29  cont low tidal volume ventilation  cont decrease fio2 and cont to decrease peep to 10  cont abx w/ broad spectrum and double coverage for legionella  d/c vanco  f/u BAL cx and cell count -c/w neutrophilic alveolitis  f/u other autoimmune w/u and fungal w/u - so far not significantly positive  pt also utox +ve for THC and family reports cocaine use so unclear if these are contributing etiologies  HIV negative but cd4 low range     CVS  was on pressors from sepsis and sedation related reasons but now off  off levophed  cont Vit C protocol but consider d/c tomorrow if remains hemodynamically stable    Renal  STAN improving again w/ strong diuresis after lasix dose o/n   cont to monitor UO       GI  cont tube feeding        Critical Care Time 50min

## 2019-07-25 NOTE — PROGRESS NOTE ADULT - SUBJECTIVE AND OBJECTIVE BOX
HPI:  33 years old male here c/o fever nausea and non-bloody diarrhea with diffused abd pain since yesterday. Pt denies headache, dizziness, blurred vision, light sensitivities, focal/distal weakness or numbness, neck/back pain, cough, sob, chest pain, vomiting, dysuria, hematuria or irregular bowel movements. Pt also denies recent traveling.   CXR showed RLL infiltrates, Abd CT revealed RLL PNA, he was started on IVF, IV antibiotics. (19 Jul 2019 18:39)      24 hr events:  Vent settings decreased to baseline fio2 40% and peep 5  Tolerating PS trials and extubated       ## ROS:  [x ] unable to obtain as pt intubated      ## Labs:                                     10.9   9.36  )-----------( 294      ( 25 Jul 2019 04:14 )             33.2   07-25    141  |  102  |  36<H>  ----------------------------<  121<H>  3.0<L>   |  30  |  1.44<H>    Ca    7.8<L>      25 Jul 2019 04:14  Phos  2.6     07-25  Mg     2.4     07-25            MEDICATIONS  (STANDING):  ALBUTerol/ipratropium for Nebulization 3 milliLiter(s) Nebulizer every 6 hours  azithromycin  IVPB      azithromycin  IVPB 500 milliGRAM(s) IV Intermittent every 24 hours  chlorhexidine 0.12% Liquid 15 milliLiter(s) Oral Mucosa two times a day  chlorhexidine 4% Liquid 1 Application(s) Topical <User Schedule>  dexmedetomidine Infusion 0.2 MICROgram(s)/kG/Hr (5.235 mL/Hr) IV Continuous <Continuous>  dextrose 5%. 1000 milliLiter(s) (50 mL/Hr) IV Continuous <Continuous>  dextrose 50% Injectable 12.5 Gram(s) IV Push once  dextrose 50% Injectable 25 Gram(s) IV Push once  dextrose 50% Injectable 25 Gram(s) IV Push once  enoxaparin Injectable 40 milliGRAM(s) SubCutaneous daily  fentaNYL   Infusion. 0.5 MICROgram(s)/kG/Hr (5.235 mL/Hr) IV Continuous <Continuous>  hydrocortisone sodium succinate Injectable 50 milliGRAM(s) IV Push every 8 hours  insulin lispro (HumaLOG) corrective regimen sliding scale   SubCutaneous every 6 hours  levoFLOXacin IVPB 750 milliGRAM(s) IV Intermittent every 24 hours  meropenem  IVPB 1000 milliGRAM(s) IV Intermittent every 8 hours  nicotine -  14 mG/24Hr(s) Patch 1 patch Transdermal daily  pantoprazole   Suspension 40 milliGRAM(s) Oral daily  propofol Infusion 50 MICROgram(s)/kG/Min (31.41 mL/Hr) IV Continuous <Continuous>         ## Imaging:  CT: bilateral airspace disease (coarse/hazy opacities). Small effusion. No PE    ICU Vital Signs Last 24 Hrs  T(C): 37.1 (25 Jul 2019 11:15), Max: 37.3 (25 Jul 2019 08:25)  T(F): 98.7 (25 Jul 2019 11:15), Max: 99.1 (25 Jul 2019 08:25)  HR: 63 (25 Jul 2019 12:00) (51 - 92)  BP: --  BP(mean): --  ABP: 152/74 (25 Jul 2019 12:00) (107/67 - 152/74)  ABP(mean): 95 (25 Jul 2019 12:00) (76 - 106)  RR: 24 (25 Jul 2019 12:00) (15 - 33)  SpO2: 96% (25 Jul 2019 12:00) (93% - 100%)        ## P/E:  Gen: intubated, supine positioning  HEENT: RADHA   Resp: b/l rhonchi  CVS: S1S2 no m/r/g  Abd: soft NT/ND +BS  Ext: no c/c/e  Neuro: awake and alert and moving all extremities

## 2019-07-26 LAB
ANION GAP SERPL CALC-SCNC: 6 MMOL/L — SIGNIFICANT CHANGE UP (ref 5–17)
ANION GAP SERPL CALC-SCNC: 8 MMOL/L — SIGNIFICANT CHANGE UP (ref 5–17)
BUN SERPL-MCNC: 28 MG/DL — HIGH (ref 7–23)
BUN SERPL-MCNC: 31 MG/DL — HIGH (ref 7–23)
CALCIUM SERPL-MCNC: 7.9 MG/DL — LOW (ref 8.5–10.1)
CALCIUM SERPL-MCNC: 8.4 MG/DL — LOW (ref 8.5–10.1)
CHLORIDE SERPL-SCNC: 101 MMOL/L — SIGNIFICANT CHANGE UP (ref 96–108)
CHLORIDE SERPL-SCNC: 98 MMOL/L — SIGNIFICANT CHANGE UP (ref 96–108)
CO2 SERPL-SCNC: 34 MMOL/L — HIGH (ref 22–31)
CO2 SERPL-SCNC: 36 MMOL/L — HIGH (ref 22–31)
CREAT SERPL-MCNC: 1.28 MG/DL — SIGNIFICANT CHANGE UP (ref 0.5–1.3)
CREAT SERPL-MCNC: 1.38 MG/DL — HIGH (ref 0.5–1.3)
GLUCOSE BLDC GLUCOMTR-MCNC: 101 MG/DL — HIGH (ref 70–99)
GLUCOSE BLDC GLUCOMTR-MCNC: 110 MG/DL — HIGH (ref 70–99)
GLUCOSE BLDC GLUCOMTR-MCNC: 112 MG/DL — HIGH (ref 70–99)
GLUCOSE BLDC GLUCOMTR-MCNC: 129 MG/DL — HIGH (ref 70–99)
GLUCOSE SERPL-MCNC: 106 MG/DL — HIGH (ref 70–99)
GLUCOSE SERPL-MCNC: 117 MG/DL — HIGH (ref 70–99)
HCT VFR BLD CALC: 34.7 % — LOW (ref 39–50)
HGB BLD-MCNC: 11.5 G/DL — LOW (ref 13–17)
MAGNESIUM SERPL-MCNC: 2.7 MG/DL — HIGH (ref 1.6–2.6)
MCHC RBC-ENTMCNC: 29.4 PG — SIGNIFICANT CHANGE UP (ref 27–34)
MCHC RBC-ENTMCNC: 33.1 GM/DL — SIGNIFICANT CHANGE UP (ref 32–36)
MCV RBC AUTO: 88.7 FL — SIGNIFICANT CHANGE UP (ref 80–100)
NRBC # BLD: 0 /100 WBCS — SIGNIFICANT CHANGE UP (ref 0–0)
PHOSPHATE SERPL-MCNC: 3.4 MG/DL — SIGNIFICANT CHANGE UP (ref 2.5–4.5)
PLATELET # BLD AUTO: 355 K/UL — SIGNIFICANT CHANGE UP (ref 150–400)
POTASSIUM SERPL-MCNC: 2.9 MMOL/L — CRITICAL LOW (ref 3.5–5.3)
POTASSIUM SERPL-MCNC: 3 MMOL/L — LOW (ref 3.5–5.3)
POTASSIUM SERPL-SCNC: 2.9 MMOL/L — CRITICAL LOW (ref 3.5–5.3)
POTASSIUM SERPL-SCNC: 3 MMOL/L — LOW (ref 3.5–5.3)
PROCALCITONIN SERPL-MCNC: 0.33 NG/ML — HIGH (ref 0.02–0.1)
RBC # BLD: 3.91 M/UL — LOW (ref 4.2–5.8)
RBC # FLD: 14.8 % — HIGH (ref 10.3–14.5)
SODIUM SERPL-SCNC: 140 MMOL/L — SIGNIFICANT CHANGE UP (ref 135–145)
SODIUM SERPL-SCNC: 143 MMOL/L — SIGNIFICANT CHANGE UP (ref 135–145)
WBC # BLD: 9.3 K/UL — SIGNIFICANT CHANGE UP (ref 3.8–10.5)
WBC # FLD AUTO: 9.3 K/UL — SIGNIFICANT CHANGE UP (ref 3.8–10.5)

## 2019-07-26 PROCEDURE — 99233 SBSQ HOSP IP/OBS HIGH 50: CPT

## 2019-07-26 RX ORDER — HYDROCORTISONE 20 MG
50 TABLET ORAL EVERY 8 HOURS
Refills: 0 | Status: DISCONTINUED | OUTPATIENT
Start: 2019-07-26 | End: 2019-07-27

## 2019-07-26 RX ORDER — POTASSIUM CHLORIDE 20 MEQ
40 PACKET (EA) ORAL ONCE
Refills: 0 | Status: COMPLETED | OUTPATIENT
Start: 2019-07-26 | End: 2019-07-26

## 2019-07-26 RX ORDER — POTASSIUM CHLORIDE 20 MEQ
10 PACKET (EA) ORAL ONCE
Refills: 0 | Status: COMPLETED | OUTPATIENT
Start: 2019-07-26 | End: 2019-07-26

## 2019-07-26 RX ORDER — AZITHROMYCIN 500 MG/1
500 TABLET, FILM COATED ORAL DAILY
Refills: 0 | Status: DISCONTINUED | OUTPATIENT
Start: 2019-07-26 | End: 2019-07-29

## 2019-07-26 RX ADMIN — Medication 100 MILLIEQUIVALENT(S): at 06:25

## 2019-07-26 RX ADMIN — Medication 50 MILLIGRAM(S): at 15:04

## 2019-07-26 RX ADMIN — Medication 1 PATCH: at 13:05

## 2019-07-26 RX ADMIN — Medication 40 MILLIEQUIVALENT(S): at 06:25

## 2019-07-26 RX ADMIN — Medication 1 PATCH: at 17:40

## 2019-07-26 RX ADMIN — Medication 40 MILLIEQUIVALENT(S): at 15:24

## 2019-07-26 RX ADMIN — Medication 3 MILLILITER(S): at 23:33

## 2019-07-26 RX ADMIN — Medication 3 MILLILITER(S): at 00:51

## 2019-07-26 RX ADMIN — Medication 3 MILLILITER(S): at 05:15

## 2019-07-26 RX ADMIN — Medication 3 MILLILITER(S): at 17:14

## 2019-07-26 RX ADMIN — AZITHROMYCIN 500 MILLIGRAM(S): 500 TABLET, FILM COATED ORAL at 18:13

## 2019-07-26 RX ADMIN — Medication 50 MILLIGRAM(S): at 22:50

## 2019-07-26 RX ADMIN — Medication 1 PATCH: at 11:00

## 2019-07-26 RX ADMIN — Medication 40 MILLIEQUIVALENT(S): at 10:11

## 2019-07-26 RX ADMIN — CHLORHEXIDINE GLUCONATE 1 APPLICATION(S): 213 SOLUTION TOPICAL at 15:04

## 2019-07-26 RX ADMIN — Medication 50 MILLIGRAM(S): at 06:25

## 2019-07-26 RX ADMIN — Medication 3 MILLILITER(S): at 11:33

## 2019-07-26 RX ADMIN — PANTOPRAZOLE SODIUM 40 MILLIGRAM(S): 20 TABLET, DELAYED RELEASE ORAL at 13:05

## 2019-07-26 RX ADMIN — ENOXAPARIN SODIUM 40 MILLIGRAM(S): 100 INJECTION SUBCUTANEOUS at 14:00

## 2019-07-26 RX ADMIN — AZITHROMYCIN 255 MILLIGRAM(S): 500 TABLET, FILM COATED ORAL at 13:00

## 2019-07-26 RX ADMIN — Medication 1 PATCH: at 07:00

## 2019-07-26 NOTE — PROGRESS NOTE ADULT - SUBJECTIVE AND OBJECTIVE BOX
Patient is a 33y old  Male who presents with a chief complaint of Pneumonia (25 Jul 2019 15:34)      INTERVAL HPI / OVERNIGHT EVENTS: doing better,mild shortness of breath ,two BM    MEDICATIONS  (STANDING):  ALBUTerol/ipratropium for Nebulization 3 milliLiter(s) Nebulizer every 6 hours  azithromycin   Tablet 500 milliGRAM(s) Oral daily  dextrose 5%. 1000 milliLiter(s) (50 mL/Hr) IV Continuous <Continuous>  dextrose 50% Injectable 12.5 Gram(s) IV Push once  dextrose 50% Injectable 25 Gram(s) IV Push once  dextrose 50% Injectable 25 Gram(s) IV Push once  enoxaparin Injectable 40 milliGRAM(s) SubCutaneous daily  hydrocortisone sodium succinate Injectable 50 milliGRAM(s) IV Push every 8 hours  insulin lispro (HumaLOG) corrective regimen sliding scale   SubCutaneous every 6 hours  levoFLOXacin  Tablet 750 milliGRAM(s) Oral every 24 hours  nicotine -  14 mG/24Hr(s) Patch 1 patch Transdermal daily  pantoprazole   Suspension 40 milliGRAM(s) Oral daily    MEDICATIONS  (PRN):  acetaminophen   Tablet .. 650 milliGRAM(s) Oral every 6 hours PRN Temp greater or equal to 38C (100.4F), Mild Pain (1 - 3)  dextrose 40% Gel 15 Gram(s) Oral once PRN Blood Glucose LESS THAN 70 milliGRAM(s)/deciliter  glucagon  Injectable 1 milliGRAM(s) IntraMuscular once PRN Glucose LESS THAN 70 milligrams/deciliter  guaiFENesin    Syrup 200 milliGRAM(s) Oral every 6 hours PRN Cough      Vital Signs Last 24 Hrs  T(C): 36.6 (26 Jul 2019 15:12), Max: 37.2 (25 Jul 2019 23:15)  T(F): 97.9 (26 Jul 2019 15:12), Max: 99 (25 Jul 2019 23:15)  HR: 90 (26 Jul 2019 12:00) (72 - 112)  BP: 119/79 (26 Jul 2019 12:00) (113/72 - 144/70)  BP(mean): 91 (26 Jul 2019 12:00) (84 - 98)  RR: 18 (26 Jul 2019 12:00) (17 - 33)  SpO2: 94% (26 Jul 2019 12:00) (90% - 99%)    Review of systems:  General : no fever /chills,fatigue  CVS : no chest pain, palpitations  Lungs : no shortness of breath, cough  GI : no abdominal pain,vomiting, diarrhea   : no dysuria,hematuria        PHYSICAL EXAM:  General :NAD  Constitutional:  well-groomed, well-developed  Respiratory: CTAB/L, on high flow oxygen  Cardiovascular: S1 and S2, RRR, no M/G/R  Gastrointestinal: BS+, soft, NT/ND  Extremities: No peripheral edema  Vascular: 2+ peripheral pulses  Skin: No rashes      LABS:                        11.5   9.30  )-----------( 355      ( 26 Jul 2019 04:18 )             34.7     07-26    143  |  101  |  28<H>  ----------------------------<  117<H>  3.0<L>   |  34<H>  |  1.28    Ca    7.9<L>      26 Jul 2019 12:58  Phos  3.4     07-26  Mg     2.7     07-26            MICROBIOLOGY:  RECENT CULTURES:  07-21 Bronch Wash XXXX   Few polymorphonuclear leukocytes per low power field  No squamous epithelial cells per low power field  No organisms seen   Normal Respiratory Marie present    07-20 .Stool XXXX XXXX   No enteric pathogens isolated.  (Stool culture examined for Salmonella,  Shigella, Campylobacter, Aeromonas, Plesiomonas,  Vibrio, E.coli O157 and Yersinia)    07-20 .Blood XXXX XXXX   No growth at 5 days.          RADIOLOGY & ADDITIONAL STUDIES:

## 2019-07-26 NOTE — PROGRESS NOTE ADULT - SUBJECTIVE AND OBJECTIVE BOX
INTERVAL HPI:  33 years old male with no significant medical history here c/o fever nausea and non-bloody diarrhea with diffused abd pain for one day. Started about 4 days prior to that with right lower chest, back and knee pain along with profuse diarrhea. Also with diffuse body ache, sweating and chills. Then started with sob.  Admitted with RLL pneumonia.  07/20/19: With increasing Respiratory distress, temperature up to 104 with chills. RRT was called, got started on BIPAP support with some improvement.   Active smoker 1 ppd. uses alcohol and Marijuana socially.  07/21/19:  Transferred to ICU due to worsening Respiratory status.  Got intubated, on vent with high FIO2 and peep.  07/22/19: Legionella urine antigen positive.  07/25/19: Extubated.  07/26/19: Transferred out of ICU.    OVERNIGHT EVENTS:  Awake, comfortable, on high flow O2.    Vital Signs Last 24 Hrs  T(C): 36.7 (26 Jul 2019 17:10), Max: 37.2 (25 Jul 2019 23:15)  T(F): 98 (26 Jul 2019 17:10), Max: 99 (25 Jul 2019 23:15)  HR: 84 (26 Jul 2019 17:23) (72 - 112)  BP: 119/67 (26 Jul 2019 17:10) (113/72 - 144/70)  BP(mean): 85 (26 Jul 2019 15:26) (84 - 98)  RR: 16 (26 Jul 2019 17:23) (16 - 33)  SpO2: 98% (26 Jul 2019 17:23) (90% - 99%)    PHYSICAL EXAM:  GEN:         Awake, responsive and comfortable.  HEENT:     High flow O2  RESP:        no wheezing.     CVS:          Regular rate and rhythm.   ABD:         Soft, non-tender, non-distended;     MEDICATIONS  (STANDING):  ALBUTerol/ipratropium for Nebulization 3 milliLiter(s) Nebulizer every 6 hours  azithromycin   Tablet 500 milliGRAM(s) Oral daily  dextrose 5%. 1000 milliLiter(s) (50 mL/Hr) IV Continuous <Continuous>  dextrose 50% Injectable 12.5 Gram(s) IV Push once  dextrose 50% Injectable 25 Gram(s) IV Push once  dextrose 50% Injectable 25 Gram(s) IV Push once  enoxaparin Injectable 40 milliGRAM(s) SubCutaneous daily  hydrocortisone sodium succinate Injectable 50 milliGRAM(s) IV Push every 8 hours  insulin lispro (HumaLOG) corrective regimen sliding scale   SubCutaneous every 6 hours  levoFLOXacin  Tablet 750 milliGRAM(s) Oral every 24 hours  nicotine -  14 mG/24Hr(s) Patch 1 patch Transdermal daily  pantoprazole   Suspension 40 milliGRAM(s) Oral daily    MEDICATIONS  (PRN):  acetaminophen   Tablet .. 650 milliGRAM(s) Oral every 6 hours PRN Temp greater or equal to 38C (100.4F), Mild Pain (1 - 3)  dextrose 40% Gel 15 Gram(s) Oral once PRN Blood Glucose LESS THAN 70 milliGRAM(s)/deciliter  glucagon  Injectable 1 milliGRAM(s) IntraMuscular once PRN Glucose LESS THAN 70 milligrams/deciliter  guaiFENesin    Syrup 200 milliGRAM(s) Oral every 6 hours PRN Cough    LABS:                        11.5   9.30  )-----------( 355      ( 26 Jul 2019 04:18 )             34.7     07-26    143  |  101  |  28<H>  ----------------------------<  117<H>  3.0<L>   |  34<H>  |  1.28    Ca    7.9<L>      26 Jul 2019 12:58  Phos  3.4     07-26  Mg     2.7     07-26    07-25 @ 10:28  pH: 7.43  pCO2: 49  pO2: 111  SaO2: 98  07-24 @ 08:36  pH: 7.35  pCO2: 54  pO2: 80  SaO2: 95  07-23 @ 11:57  pH: 7.37  pCO2: 43  pO2: 111  SaO2: 98  07-23 @ 07:54  pH: 7.45  pCO2: 34  pO2: 115  SaO2: 99  07-22 @ 20:04  pH: 7.41  pCO2: 39  pO2: 78  SaO2: 96  07-22 @ 09:05  pH: 7.38  pCO2: 39  pO2: 95  SaO2: 98  07-22 @ 02:20  pH: 7.37  pCO2: 33  pO2: 116  SaO2: 99  07-21 @ 21:32  pH: 7.37  pCO2: 39  pO2: 86  SaO2: 97  07-21 @ 21:16  pH: 7.36  pCO2: 41  pO2: 62  SaO2: 92  07-21 @ 17:30  pH: 7.29  pCO2: 49  pO2: 102  SaO2: 97  07-21 @ 06:17  pH: 7.46  pCO2: 31  pO2: 82  SaO2: 97  07-20 @ 15:06  pH: 7.49  pCO2: 25  pO2: 70  SaO2: 96    ASSESSMENT AND PLAN:  ·	Acute Hypoxic Respiratory failure.  ·	Legionella Pneumonia.  ·	ARDS.  ·	Sepsis.  ·	Diarrhea.  ·	Hypokalemia better.  ·	Tobacco abuse.  ·	Obesity.    Continue O2 and antibiotics.

## 2019-07-26 NOTE — PROGRESS NOTE ADULT - ASSESSMENT
34 yo M p/w PNA, ARDS and currently intubated and in prone positioning. Now + for legionella Ag. Slowly improving w/ clearing CXR and decreasing vent parameters. Doing well postextubation on HFNC. Pt stabilized and planned for transfer.     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

## 2019-07-26 NOTE — PROGRESS NOTE ADULT - SUBJECTIVE AND OBJECTIVE BOX
HPI:  33 years old male here c/o fever nausea and non-bloody diarrhea with diffused abd pain since yesterday. Pt denies headache, dizziness, blurred vision, light sensitivities, focal/distal weakness or numbness, neck/back pain, cough, sob, chest pain, vomiting, dysuria, hematuria or irregular bowel movements. Pt also denies recent traveling.   CXR showed RLL infiltrates, Abd CT revealed RLL PNA, he was started on IVF, IV antibiotics. (19 Jul 2019 18:39)      24 hr events:  No overnight events  Tolerating clears and diet advanced  Garibay d/c    ## ROS:  Denies SOB  Denies CP  Denies fever  Denies HA  Denies abd pain  Denies swallowing difficulty      ## Labs:                                 11.5   9.30  )-----------( 355      ( 26 Jul 2019 04:18 )             34.7   07-26    143  |  101  |  28<H>  ----------------------------<  117<H>  3.0<L>   |  34<H>  |  1.28    Ca    7.9<L>      26 Jul 2019 12:58  Phos  3.4     07-26  Mg     2.7     07-26           MEDICATIONS  (STANDING):  ALBUTerol/ipratropium for Nebulization 3 milliLiter(s) Nebulizer every 6 hours  azithromycin  IVPB      azithromycin  IVPB 500 milliGRAM(s) IV Intermittent every 24 hours  chlorhexidine 4% Liquid 1 Application(s) Topical <User Schedule>  dextrose 5%. 1000 milliLiter(s) (50 mL/Hr) IV Continuous <Continuous>  dextrose 50% Injectable 12.5 Gram(s) IV Push once  dextrose 50% Injectable 25 Gram(s) IV Push once  dextrose 50% Injectable 25 Gram(s) IV Push once  enoxaparin Injectable 40 milliGRAM(s) SubCutaneous daily  hydrocortisone sodium succinate Injectable 50 milliGRAM(s) IV Push every 8 hours  insulin lispro (HumaLOG) corrective regimen sliding scale   SubCutaneous every 6 hours  levoFLOXacin IVPB 750 milliGRAM(s) IV Intermittent every 24 hours  nicotine -  14 mG/24Hr(s) Patch 1 patch Transdermal daily  pantoprazole   Suspension 40 milliGRAM(s) Oral daily  ## Imaging:  CT: bilateral airspace disease (coarse/hazy opacities). Small effusion. No PE    ICU Vital Signs Last 24 Hrs  T(C): 36.7 (26 Jul 2019 12:00), Max: 37.2 (25 Jul 2019 23:15)  T(F): 98.1 (26 Jul 2019 12:00), Max: 99 (25 Jul 2019 23:15)  HR: 90 (26 Jul 2019 12:00) (72 - 112)  BP: 119/79 (26 Jul 2019 12:00) (75/46 - 144/70)  BP(mean): 91 (26 Jul 2019 12:00) (54 - 98)  ABP: 163/97 (25 Jul 2019 14:00) (163/97 - 163/97)  ABP(mean): 118 (25 Jul 2019 14:00) (118 - 118)  RR: 18 (26 Jul 2019 12:00) (17 - 33)  SpO2: 94% (26 Jul 2019 12:00) (90% - 99%)    ## P/E:  Gen:  awake, comfortable  HEENT: RADHA   Resp: b/l rhonchi  CVS: S1S2 no m/r/g  Abd: soft NT/ND +BS  Ext: no c/c/e  Neuro: awake and alert and moving all extremities 5/5 b/l UE and LE

## 2019-07-26 NOTE — CHART NOTE - NSCHARTNOTEFT_GEN_A_CORE
HPI  33 years old male here c/o fever nausea and non-bloody diarrhea with diffused abd pain since yesterday. Pt denies headache, dizziness, blurred vision, light sensitivities, focal/distal weakness or numbness, neck/back pain, cough, sob, chest pain, vomiting, dysuria, hematuria or irregular bowel movements. Pt also denies recent traveling.   CXR showed RLL infiltrates, Abd CT revealed RLL PNA, he was started on IVF, IV antibiotics.  Patient transferred to ICU for Increased work of breathing, intubated and pneumonia progressed to ARDS with Legionella urine antigen now positive. Patient required to be placed in prone position for 24 hours and respiratory status improved. Placed supine and able to be weaned. Patient now on high flow oxygen. Followed by Dr. Shah. Continue antibiotics as per ID. Dr. Hargrove on case.   Patient OOB chair, PT ordered, Diet increased. Tolerating PO well. Patient seen and examined by ICU attending and stable for transfer to non monitored bed.     Report given to Dr. akins service    ZHANNA Brand  critical care HPI  33 years old male here c/o fever nausea and non-bloody diarrhea with diffused abd pain since yesterday. Pt denies headache, dizziness, blurred vision, light sensitivities, focal/distal weakness or numbness, neck/back pain, cough, sob, chest pain, vomiting, dysuria, hematuria or irregular bowel movements. Pt also denies recent traveling.   CXR showed RLL infiltrates, Abd CT revealed RLL PNA, he was started on IVF, IV antibiotics.  Patient transferred to ICU for Increased work of breathing, intubated and pneumonia progressed to ARDS with Legionella urine antigen now positive. Patient required to be placed in prone position for 24 hours and respiratory status improved. Placed supine and able to be weaned. Patient now on high flow oxygen. Followed by Dr. Shah. Continue antibiotics as per ID. Dr. Hargrove on case.   Patient OOB chair, PT ordered, Diet increased. Tolerating PO well. Patient seen and examined by ICU attending and stable for transfer to non monitored bed.     Report given to Dr. Sánchez,  hospitalist service    Tracy Oswald NP-C  critical care

## 2019-07-27 LAB
ANION GAP SERPL CALC-SCNC: 7 MMOL/L — SIGNIFICANT CHANGE UP (ref 5–17)
BUN SERPL-MCNC: 24 MG/DL — HIGH (ref 7–23)
CALCIUM SERPL-MCNC: 8.3 MG/DL — LOW (ref 8.5–10.1)
CHLORIDE SERPL-SCNC: 98 MMOL/L — SIGNIFICANT CHANGE UP (ref 96–108)
CO2 SERPL-SCNC: 37 MMOL/L — HIGH (ref 22–31)
CREAT SERPL-MCNC: 1.23 MG/DL — SIGNIFICANT CHANGE UP (ref 0.5–1.3)
GLUCOSE BLDC GLUCOMTR-MCNC: 101 MG/DL — HIGH (ref 70–99)
GLUCOSE BLDC GLUCOMTR-MCNC: 113 MG/DL — HIGH (ref 70–99)
GLUCOSE BLDC GLUCOMTR-MCNC: 113 MG/DL — HIGH (ref 70–99)
GLUCOSE BLDC GLUCOMTR-MCNC: 153 MG/DL — HIGH (ref 70–99)
GLUCOSE SERPL-MCNC: 93 MG/DL — SIGNIFICANT CHANGE UP (ref 70–99)
HCT VFR BLD CALC: 34.4 % — LOW (ref 39–50)
HGB BLD-MCNC: 11.1 G/DL — LOW (ref 13–17)
MAGNESIUM SERPL-MCNC: 2.7 MG/DL — HIGH (ref 1.6–2.6)
MCHC RBC-ENTMCNC: 29.1 PG — SIGNIFICANT CHANGE UP (ref 27–34)
MCHC RBC-ENTMCNC: 32.3 GM/DL — SIGNIFICANT CHANGE UP (ref 32–36)
MCV RBC AUTO: 90.1 FL — SIGNIFICANT CHANGE UP (ref 80–100)
NRBC # BLD: 0 /100 WBCS — SIGNIFICANT CHANGE UP (ref 0–0)
PHOSPHATE SERPL-MCNC: 2.9 MG/DL — SIGNIFICANT CHANGE UP (ref 2.5–4.5)
PLATELET # BLD AUTO: 324 K/UL — SIGNIFICANT CHANGE UP (ref 150–400)
POTASSIUM SERPL-MCNC: 3 MMOL/L — SIGNIFICANT CHANGE UP (ref 3.5–5.3)
POTASSIUM SERPL-SCNC: 3 MMOL/L — SIGNIFICANT CHANGE UP (ref 3.5–5.3)
PROCALCITONIN SERPL-MCNC: 0.21 NG/ML — HIGH (ref 0.02–0.1)
RBC # BLD: 3.82 M/UL — LOW (ref 4.2–5.8)
RBC # FLD: 14.7 % — HIGH (ref 10.3–14.5)
SODIUM SERPL-SCNC: 142 MMOL/L — SIGNIFICANT CHANGE UP (ref 135–145)
WBC # BLD: 8.72 K/UL — SIGNIFICANT CHANGE UP (ref 3.8–10.5)
WBC # FLD AUTO: 8.72 K/UL — SIGNIFICANT CHANGE UP (ref 3.8–10.5)

## 2019-07-27 PROCEDURE — 99233 SBSQ HOSP IP/OBS HIGH 50: CPT

## 2019-07-27 RX ORDER — HYDROCORTISONE 20 MG
50 TABLET ORAL EVERY 12 HOURS
Refills: 0 | Status: DISCONTINUED | OUTPATIENT
Start: 2019-07-27 | End: 2019-07-28

## 2019-07-27 RX ORDER — PANTOPRAZOLE SODIUM 20 MG/1
40 TABLET, DELAYED RELEASE ORAL
Refills: 0 | Status: DISCONTINUED | OUTPATIENT
Start: 2019-07-27 | End: 2019-07-29

## 2019-07-27 RX ORDER — POTASSIUM CHLORIDE 20 MEQ
40 PACKET (EA) ORAL ONCE
Refills: 0 | Status: COMPLETED | OUTPATIENT
Start: 2019-07-27 | End: 2019-07-27

## 2019-07-27 RX ADMIN — Medication 1: at 23:03

## 2019-07-27 RX ADMIN — Medication 50 MILLIGRAM(S): at 05:21

## 2019-07-27 RX ADMIN — Medication 50 MILLIGRAM(S): at 19:04

## 2019-07-27 RX ADMIN — AZITHROMYCIN 500 MILLIGRAM(S): 500 TABLET, FILM COATED ORAL at 11:37

## 2019-07-27 RX ADMIN — ENOXAPARIN SODIUM 40 MILLIGRAM(S): 100 INJECTION SUBCUTANEOUS at 11:37

## 2019-07-27 RX ADMIN — Medication 3 MILLILITER(S): at 11:16

## 2019-07-27 RX ADMIN — Medication 3 MILLILITER(S): at 17:07

## 2019-07-27 RX ADMIN — PANTOPRAZOLE SODIUM 40 MILLIGRAM(S): 20 TABLET, DELAYED RELEASE ORAL at 12:46

## 2019-07-27 RX ADMIN — Medication 40 MILLIEQUIVALENT(S): at 11:39

## 2019-07-27 RX ADMIN — Medication 3 MILLILITER(S): at 05:53

## 2019-07-27 RX ADMIN — Medication 3 MILLILITER(S): at 23:48

## 2019-07-27 NOTE — PROGRESS NOTE ADULT - ASSESSMENT
34 yo M p/w PNA, ARDS and currently intubated and in prone positioning. Now + for legionella Ag. Slowly improving w/ clearing CXR and decreasing vent parameters. Doing well postextubation on HFNC. Pt stabilized and planned for transfer.     Neuro  off sedation which was used for vent mamanagment    Pulm  ARDS from legionella PNA  s/p resp failure s/p extubation.    continue with antibx per OD reccs   and pulmonary care per pulm .  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 Now + for legionella Ag. Slowly improving w/ clearing CXR and decreasing vent parameters. Doing well postextubation on HFNC. Pt stabilized and planned for transfer.     Neuro  off sedation which was used for vent mamanagment    Pulm  ARDS from legionella PNA  s/p resp failure s/p extubation.    continue with antibx per OD reccs   and pulmonary care per pulm .  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

## 2019-07-27 NOTE — PROGRESS NOTE ADULT - SUBJECTIVE AND OBJECTIVE BOX
HPI:  33 years old male here c/o fever nausea and non-bloody diarrhea with diffused abd pain since yesterday. Pt denies headache, dizziness, blurred vision, light sensitivities, focal/distal weakness or numbness, neck/back pain, cough, sob, chest pain, vomiting, dysuria, hematuria or irregular bowel movements. Pt also denies recent traveling.   CXR showed RLL infiltrates, Abd CT revealed RLL PNA, he was started on IVF, IV antibiotics. (19 Jul 2019 18:39)    MEDICATIONS  (STANDING):  ALBUTerol/ipratropium for Nebulization 3 milliLiter(s) Nebulizer every 6 hours  azithromycin   Tablet 500 milliGRAM(s) Oral daily  dextrose 5%. 1000 milliLiter(s) (50 mL/Hr) IV Continuous <Continuous>  dextrose 50% Injectable 12.5 Gram(s) IV Push once  dextrose 50% Injectable 25 Gram(s) IV Push once  dextrose 50% Injectable 25 Gram(s) IV Push once  enoxaparin Injectable 40 milliGRAM(s) SubCutaneous daily  hydrocortisone sodium succinate Injectable 50 milliGRAM(s) IV Push every 8 hours  insulin lispro (HumaLOG) corrective regimen sliding scale   SubCutaneous every 6 hours  levoFLOXacin  Tablet 750 milliGRAM(s) Oral every 24 hours  nicotine -  14 mG/24Hr(s) Patch 1 patch Transdermal daily  pantoprazole   Suspension 40 milliGRAM(s) Oral daily    MEDICATIONS  (PRN):  acetaminophen   Tablet .. 650 milliGRAM(s) Oral every 6 hours PRN Temp greater or equal to 38C (100.4F), Mild Pain (1 - 3)  dextrose 40% Gel 15 Gram(s) Oral once PRN Blood Glucose LESS THAN 70 milliGRAM(s)/deciliter  glucagon  Injectable 1 milliGRAM(s) IntraMuscular once PRN Glucose LESS THAN 70 milligrams/deciliter  guaiFENesin    Syrup 200 milliGRAM(s) Oral every 6 hours PRN Cough        Vital Signs Last 24 Hrs  T(C): 37.2 (27 Jul 2019 05:23), Max: 37.3 (26 Jul 2019 23:36)  T(F): 99 (27 Jul 2019 05:23), Max: 99.2 (26 Jul 2019 23:36)  HR: 111 (27 Jul 2019 08:55) (74 - 112)  BP: 116/96 (27 Jul 2019 05:23) (116/96 - 128/55)  BP(mean): 85 (26 Jul 2019 15:26) (85 - 91)  RR: 19 (27 Jul 2019 08:55) (16 - 23)  SpO2: 96% (27 Jul 2019 08:55) (90% - 99%)    GENERAL: NAD, well-groomed, well-developed  HEAD:  Atraumatic, Normocephalic  EYES: EOMI, PERRLA, conjunctiva and sclera clear  ENMT: No tonsillar erythema, exudates, or enlargement; Moist mucous membranes, Good dentition, No lesions  NECK: Supple, No JVD, Normal thyroid  NERVOUS SYSTEM:  Alert & Oriented X3, Good concentration; Motor Strength 5/5 B/L upper and lower extremities; DTRs 2+ intact and symmetric  CHEST/LUNG: Clear to percussion bilaterally; No rales, rhonchi, wheezing, or rubs  HEART: Regular rate and rhythm; No murmurs, rubs, or gallops  ABDOMEN: Soft, Nontender, Nondistended; Bowel sounds present  EXTREMITIES:  2+ Peripheral Pulses, No clubbing, cyanosis, or edema  SKIN: No rashes or lesions             LABS:               11.1   8.72  )-----------( 324      ( 27 Jul 2019 07:15 )             34.4     07-27    142  |  98  |  24<H>  ----------------------------<  93  3.0   |  37<H>  |  1.23    Ca    8.3<L>      27 Jul 2019 07:15  Phos  2.9     07-27  Mg     2.7     07-27

## 2019-07-27 NOTE — PROGRESS NOTE ADULT - SUBJECTIVE AND OBJECTIVE BOX
INTERVAL HPI:  33 years old male with no significant medical history here c/o fever nausea and non-bloody diarrhea with diffused abd pain for one day. Started about 4 days prior to that with right lower chest, back and knee pain along with profuse diarrhea. Also with diffuse body ache, sweating and chills. Then started with sob.  Admitted with RLL pneumonia.  07/20/19: With increasing Respiratory distress, temperature up to 104 with chills. RRT was called, got started on BIPAP support with some improvement.   Active smoker 1 ppd. uses alcohol and Marijuana socially.  07/21/19:  Transferred to ICU due to worsening Respiratory status.  Got intubated, on vent with high FIO2 and peep.  07/22/19: Legionella urine antigen positive.  07/25/19: Extubated.  07/26/19: Transferred out of ICU.    OVERNIGHT EVENTS:  Feels much better. SPO2 92% on room air.    Vital Signs Last 24 Hrs  T(C): 36.7 (27 Jul 2019 11:27), Max: 37.3 (26 Jul 2019 23:36)  T(F): 98 (27 Jul 2019 11:27), Max: 99.2 (26 Jul 2019 23:36)  HR: 102 (27 Jul 2019 11:27) (74 - 111)  BP: 111/60 (27 Jul 2019 11:27) (111/60 - 128/55)  BP(mean): 85 (26 Jul 2019 15:26) (85 - 85)  RR: 18 (27 Jul 2019 11:27) (16 - 20)  SpO2: 97% (27 Jul 2019 11:27) (90% - 99%)    PHYSICAL EXAM:  GEN:         Awake, responsive and comfortable.  HEENT:    Normal.    RESP:       no wheezing.  CVS:          Regular rate and rhythm.   ABD:         Soft, non-tender, non-distended;     MEDICATIONS  (STANDING):  ALBUTerol/ipratropium for Nebulization 3 milliLiter(s) Nebulizer every 6 hours  azithromycin   Tablet 500 milliGRAM(s) Oral daily  dextrose 5%. 1000 milliLiter(s) (50 mL/Hr) IV Continuous <Continuous>  dextrose 50% Injectable 12.5 Gram(s) IV Push once  dextrose 50% Injectable 25 Gram(s) IV Push once  dextrose 50% Injectable 25 Gram(s) IV Push once  enoxaparin Injectable 40 milliGRAM(s) SubCutaneous daily  hydrocortisone sodium succinate Injectable 50 milliGRAM(s) IV Push every 8 hours  insulin lispro (HumaLOG) corrective regimen sliding scale   SubCutaneous every 6 hours  levoFLOXacin  Tablet 750 milliGRAM(s) Oral every 24 hours  nicotine -  14 mG/24Hr(s) Patch 1 patch Transdermal daily  pantoprazole    Tablet 40 milliGRAM(s) Oral before breakfast    MEDICATIONS  (PRN):  acetaminophen   Tablet .. 650 milliGRAM(s) Oral every 6 hours PRN Temp greater or equal to 38C (100.4F), Mild Pain (1 - 3)  dextrose 40% Gel 15 Gram(s) Oral once PRN Blood Glucose LESS THAN 70 milliGRAM(s)/deciliter  glucagon  Injectable 1 milliGRAM(s) IntraMuscular once PRN Glucose LESS THAN 70 milligrams/deciliter  guaiFENesin    Syrup 200 milliGRAM(s) Oral every 6 hours PRN Cough    LABS:                        11.1   8.72  )-----------( 324      ( 27 Jul 2019 07:15 )             34.4     07-27    142  |  98  |  24<H>  ----------------------------<  93  3.0   |  37<H>  |  1.23    Ca    8.3<L>      27 Jul 2019 07:15  Phos  2.9     07-27  Mg     2.7     07-27    07-25 @ 10:28  pH: 7.43  pCO2: 49  pO2: 111  SaO2: 98  07-24 @ 08:36  pH: 7.35  pCO2: 54  pO2: 80  SaO2: 95  07-23 @ 11:57  pH: 7.37  pCO2: 43  pO2: 111  SaO2: 98  07-23 @ 07:54  pH: 7.45  pCO2: 34  pO2: 115  SaO2: 99  07-22 @ 20:04  pH: 7.41  pCO2: 39  pO2: 78  SaO2: 96  07-22 @ 09:05  pH: 7.38  pCO2: 39  pO2: 95  SaO2: 98  07-22 @ 02:20  pH: 7.37  pCO2: 33  pO2: 116  SaO2: 99  07-21 @ 21:32  pH: 7.37  pCO2: 39  pO2: 86  SaO2: 97  07-21 @ 21:16  pH: 7.36  pCO2: 41  pO2: 62  SaO2: 92  07-21 @ 17:30  pH: 7.29  pCO2: 49  pO2: 102  SaO2: 97  07-21 @ 06:17  pH: 7.46  pCO2: 31  pO2: 82  SaO2: 97    ASSESSMENT AND PLAN:  ·	Acute Hypoxic Respiratory failure.  ·	Legionella Pneumonia.  ·	ARDS.  ·	Sepsis.  ·	Diarrhea.  ·	Hypokalemia better.  ·	Tobacco abuse.  ·	Obesity.    Reduce steroids.  Continue antibiotics.  O2 as needed.

## 2019-07-28 LAB
ANION GAP SERPL CALC-SCNC: 7 MMOL/L — SIGNIFICANT CHANGE UP (ref 5–17)
BUN SERPL-MCNC: 24 MG/DL — HIGH (ref 7–23)
CALCIUM SERPL-MCNC: 7.9 MG/DL — LOW (ref 8.5–10.1)
CHLORIDE SERPL-SCNC: 101 MMOL/L — SIGNIFICANT CHANGE UP (ref 96–108)
CO2 SERPL-SCNC: 34 MMOL/L — HIGH (ref 22–31)
CREAT SERPL-MCNC: 1.21 MG/DL — SIGNIFICANT CHANGE UP (ref 0.5–1.3)
GLUCOSE BLDC GLUCOMTR-MCNC: 104 MG/DL — HIGH (ref 70–99)
GLUCOSE BLDC GLUCOMTR-MCNC: 107 MG/DL — HIGH (ref 70–99)
GLUCOSE BLDC GLUCOMTR-MCNC: 97 MG/DL — SIGNIFICANT CHANGE UP (ref 70–99)
GLUCOSE SERPL-MCNC: 86 MG/DL — SIGNIFICANT CHANGE UP (ref 70–99)
HCT VFR BLD CALC: 31.1 % — LOW (ref 39–50)
HGB BLD-MCNC: 10 G/DL — LOW (ref 13–17)
MAGNESIUM SERPL-MCNC: 2.6 MG/DL — SIGNIFICANT CHANGE UP (ref 1.6–2.6)
MCHC RBC-ENTMCNC: 29.2 PG — SIGNIFICANT CHANGE UP (ref 27–34)
MCHC RBC-ENTMCNC: 32.2 GM/DL — SIGNIFICANT CHANGE UP (ref 32–36)
MCV RBC AUTO: 90.9 FL — SIGNIFICANT CHANGE UP (ref 80–100)
NRBC # BLD: 0 /100 WBCS — SIGNIFICANT CHANGE UP (ref 0–0)
PHOSPHATE SERPL-MCNC: 3.1 MG/DL — SIGNIFICANT CHANGE UP (ref 2.5–4.5)
PLATELET # BLD AUTO: 295 K/UL — SIGNIFICANT CHANGE UP (ref 150–400)
POTASSIUM SERPL-MCNC: 3.2 MMOL/L — LOW (ref 3.5–5.3)
POTASSIUM SERPL-SCNC: 3.2 MMOL/L — LOW (ref 3.5–5.3)
RBC # BLD: 3.42 M/UL — LOW (ref 4.2–5.8)
RBC # FLD: 14.6 % — HIGH (ref 10.3–14.5)
SODIUM SERPL-SCNC: 142 MMOL/L — SIGNIFICANT CHANGE UP (ref 135–145)
WBC # BLD: 8.58 K/UL — SIGNIFICANT CHANGE UP (ref 3.8–10.5)
WBC # FLD AUTO: 8.58 K/UL — SIGNIFICANT CHANGE UP (ref 3.8–10.5)

## 2019-07-28 PROCEDURE — 99233 SBSQ HOSP IP/OBS HIGH 50: CPT

## 2019-07-28 RX ORDER — IPRATROPIUM/ALBUTEROL SULFATE 18-103MCG
3 AEROSOL WITH ADAPTER (GRAM) INHALATION EVERY 6 HOURS
Refills: 0 | Status: DISCONTINUED | OUTPATIENT
Start: 2019-07-28 | End: 2019-07-29

## 2019-07-28 RX ORDER — POTASSIUM CHLORIDE 20 MEQ
40 PACKET (EA) ORAL ONCE
Refills: 0 | Status: COMPLETED | OUTPATIENT
Start: 2019-07-28 | End: 2019-07-28

## 2019-07-28 RX ADMIN — Medication 40 MILLIEQUIVALENT(S): at 13:45

## 2019-07-28 RX ADMIN — Medication 3 MILLILITER(S): at 11:16

## 2019-07-28 RX ADMIN — Medication 3 MILLILITER(S): at 05:38

## 2019-07-28 RX ADMIN — PANTOPRAZOLE SODIUM 40 MILLIGRAM(S): 20 TABLET, DELAYED RELEASE ORAL at 07:46

## 2019-07-28 RX ADMIN — AZITHROMYCIN 500 MILLIGRAM(S): 500 TABLET, FILM COATED ORAL at 12:17

## 2019-07-28 RX ADMIN — Medication 50 MILLIGRAM(S): at 06:21

## 2019-07-28 NOTE — PROGRESS NOTE ADULT - SUBJECTIVE AND OBJECTIVE BOX
HPI:  33 years old male here c/o fever nausea and non-bloody diarrhea with diffused abd pain since yesterday. Pt denies headache, dizziness, blurred vision, light sensitivities, focal/distal weakness or numbness, neck/back pain, cough, sob, chest pain, vomiting, dysuria, hematuria or irregular bowel movements. Pt also denies recent traveling.   CXR showed RLL infiltrates, Abd CT revealed RLL PNA, he was started on IVF, IV antibiotics. (19 Jul 2019 18:39)      MEDICATIONS  (STANDING):  azithromycin   Tablet 500 milliGRAM(s) Oral daily  dextrose 5%. 1000 milliLiter(s) (50 mL/Hr) IV Continuous <Continuous>  dextrose 50% Injectable 12.5 Gram(s) IV Push once  dextrose 50% Injectable 25 Gram(s) IV Push once  dextrose 50% Injectable 25 Gram(s) IV Push once  enoxaparin Injectable 40 milliGRAM(s) SubCutaneous daily  insulin lispro (HumaLOG) corrective regimen sliding scale   SubCutaneous every 6 hours  levoFLOXacin  Tablet 750 milliGRAM(s) Oral every 24 hours  nicotine -  14 mG/24Hr(s) Patch 1 patch Transdermal daily  pantoprazole    Tablet 40 milliGRAM(s) Oral before breakfast    MEDICATIONS  (PRN):  acetaminophen   Tablet .. 650 milliGRAM(s) Oral every 6 hours PRN Temp greater or equal to 38C (100.4F), Mild Pain (1 - 3)  ALBUTerol/ipratropium for Nebulization 3 milliLiter(s) Nebulizer every 6 hours PRN Shortness of Breath  dextrose 40% Gel 15 Gram(s) Oral once PRN Blood Glucose LESS THAN 70 milliGRAM(s)/deciliter  glucagon  Injectable 1 milliGRAM(s) IntraMuscular once PRN Glucose LESS THAN 70 milligrams/deciliter  guaiFENesin    Syrup 200 milliGRAM(s) Oral every 6 hours PRN Cough    Vital Signs Last 24 Hrs  T(C): 36.8 (28 Jul 2019 11:23), Max: 37.2 (28 Jul 2019 00:01)  T(F): 98.3 (28 Jul 2019 11:23), Max: 99 (28 Jul 2019 00:01)  HR: 87 (28 Jul 2019 11:23) (86 - 106)  BP: 125/77 (28 Jul 2019 11:23) (125/61 - 126/61)  BP(mean): --  RR: 18 (28 Jul 2019 11:23) (16 - 18)  SpO2: 96% (28 Jul 2019 11:23) (93% - 96%)      GENERAL: NAD, well-groomed, well-developed  HEAD:  Atraumatic, Normocephalic  EYES: EOMI, PERRLA, conjunctiva and sclera clear  ENMT: No tonsillar erythema, exudates, or enlargement; Moist mucous membranes, Good dentition, No lesions  NECK: Supple, No JVD, Normal thyroid  NERVOUS SYSTEM:  Alert & Oriented X3, Good concentration; Motor Strength 5/5 B/L upper and lower extremities; DTRs 2+ intact and symmetric  CHEST/LUNG: Clear to percussion bilaterally; No rales, rhonchi, wheezing, or rubs  HEART: Regular rate and rhythm; No murmurs, rubs, or gallops  ABDOMEN: Soft, Nontender, Nondistended; Bowel sounds present  EXTREMITIES:  2+ Peripheral Pulses, No clubbing, cyanosis, or edema  SKIN: No rashes or lesions             LABS:                           10.0   8.58  )-----------( 295      ( 28 Jul 2019 08:06 )             31.1   07-28    142  |  101  |  24<H>  ----------------------------<  86  3.2<L>   |  34<H>  |  1.21    Ca    7.9<L>      28 Jul 2019 08:06  Phos  3.1     07-28  Mg     2.6     07-28

## 2019-07-28 NOTE — PROGRESS NOTE ADULT - SUBJECTIVE AND OBJECTIVE BOX
INTERVAL HPI:  33 years old male with no significant medical history here c/o fever nausea and non-bloody diarrhea with diffused abd pain for one day. Started about 4 days prior to that with right lower chest, back and knee pain along with profuse diarrhea. Also with diffuse body ache, sweating and chills. Then started with sob.  Admitted with RLL pneumonia.  07/20/19: With increasing Respiratory distress, temperature up to 104 with chills. RRT was called, got started on BIPAP support with some improvement.   Active smoker 1 ppd. uses alcohol and Marijuana socially.  07/21/19:  Transferred to ICU due to worsening Respiratory status.  Got intubated, on vent with high FIO2 and peep.  07/22/19: Legionella urine antigen positive.  07/25/19: Extubated.  07/26/19: Transferred out of ICU.    OVERNIGHT EVENTS:  Awake, comfortable and feels better.    Vital Signs Last 24 Hrs  T(C): 36.8 (28 Jul 2019 11:23), Max: 37.2 (28 Jul 2019 00:01)  T(F): 98.3 (28 Jul 2019 11:23), Max: 99 (28 Jul 2019 00:01)  HR: 87 (28 Jul 2019 11:23) (86 - 106)  BP: 125/77 (28 Jul 2019 11:23) (111/60 - 126/61)  BP(mean): --  RR: 18 (28 Jul 2019 11:23) (16 - 18)  SpO2: 96% (28 Jul 2019 11:23) (93% - 97%)    PHYSICAL EXAM:  GEN:         Awake, responsive and comfortable.  HEENT:    Normal.    RESP:         no wheezing.  CVS:          Regular rate and rhythm.     MEDICATIONS  (STANDING):  ALBUTerol/ipratropium for Nebulization 3 milliLiter(s) Nebulizer every 6 hours  azithromycin   Tablet 500 milliGRAM(s) Oral daily  dextrose 5%. 1000 milliLiter(s) (50 mL/Hr) IV Continuous <Continuous>  dextrose 50% Injectable 12.5 Gram(s) IV Push once  dextrose 50% Injectable 25 Gram(s) IV Push once  dextrose 50% Injectable 25 Gram(s) IV Push once  enoxaparin Injectable 40 milliGRAM(s) SubCutaneous daily  hydrocortisone sodium succinate Injectable 50 milliGRAM(s) IV Push every 12 hours  insulin lispro (HumaLOG) corrective regimen sliding scale   SubCutaneous every 6 hours  levoFLOXacin  Tablet 750 milliGRAM(s) Oral every 24 hours  nicotine -  14 mG/24Hr(s) Patch 1 patch Transdermal daily  pantoprazole    Tablet 40 milliGRAM(s) Oral before breakfast    MEDICATIONS  (PRN):  acetaminophen   Tablet .. 650 milliGRAM(s) Oral every 6 hours PRN Temp greater or equal to 38C (100.4F), Mild Pain (1 - 3)  dextrose 40% Gel 15 Gram(s) Oral once PRN Blood Glucose LESS THAN 70 milliGRAM(s)/deciliter  glucagon  Injectable 1 milliGRAM(s) IntraMuscular once PRN Glucose LESS THAN 70 milligrams/deciliter  guaiFENesin    Syrup 200 milliGRAM(s) Oral every 6 hours PRN Cough    LABS:                        10.0   8.58  )-----------( 295      ( 28 Jul 2019 08:06 )             31.1     07-28    142  |  101  |  24<H>  ----------------------------<  86  3.2<L>   |  34<H>  |  1.21    Ca    7.9<L>      28 Jul 2019 08:06  Phos  3.1     07-28  Mg     2.6     07-28    07-25 @ 10:28  pH: 7.43  pCO2: 49  pO2: 111  SaO2: 98  07-24 @ 08:36  pH: 7.35  pCO2: 54  pO2: 80  SaO2: 95  07-23 @ 11:57  pH: 7.37  pCO2: 43  pO2: 111  SaO2: 98  07-23 @ 07:54  pH: 7.45  pCO2: 34  pO2: 115  SaO2: 99  07-22 @ 20:04  pH: 7.41  pCO2: 39  pO2: 78  SaO2: 96  07-22 @ 09:05  pH: 7.38  pCO2: 39  pO2: 95  SaO2: 98  07-22 @ 02:20  pH: 7.37  pCO2: 33  pO2: 116  SaO2: 99  07-21 @ 21:32  pH: 7.37  pCO2: 39  pO2: 86  SaO2: 97  07-21 @ 21:16  pH: 7.36  pCO2: 41  pO2: 62  SaO2: 92  07-21 @ 17:30  pH: 7.29  pCO2: 49  pO2: 102  SaO2: 97    ASSESSMENT AND PLAN:  ·	Acute Hypoxic Respiratory failure.  ·	Legionella Pneumonia.  ·	ARDS.  ·	Sepsis.  ·	Diarrhea.  ·	Hypokalemia better.  ·	Tobacco abuse.  ·	Obesity.    Will dc steroids, complete antibiotics per ID.  Change nebulizer to PRN.  Will need follow up CT chest in 6-8 weeks.  Smoking cessation.

## 2019-07-28 NOTE — PROGRESS NOTE ADULT - ASSESSMENT
34 yo M p/w PNA, ARDS and currently intubated and in prone positioning. Now + for legionella Ag. Slowly improving w/ clearing CXR and decreasing vent parameters. Doing well postextubation on HFNC. Pt stabilized and planned for transfer.     Neuro  off sedation which was used for vent mamanagment    Pulm  ARDS from legionella PNA  s/p resp failure s/p extubation.    continue with antibx per OD reccs   and pulmonary care per pulm .  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 Now + for legionella Ag. Slowly improving w/ clearing CXR and decreasing vent parameters. Doing well postextubation on HFNC. Pt stabilized and planned for transfer.     Neuro  off sedation which was used for vent mamanagment    Pulm  ARDS from legionella PNA  s/p resp failure s/p extubation.    continue with antibx per OD reccs   and pulmonary care per pulm .  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

## 2019-07-28 NOTE — CHART NOTE - NSCHARTNOTEFT_GEN_A_CORE
To Whom It May Concern:    Please use this letter  as verfication that the above patient was hospitalized at [facility] from 7/19/19 to present date 7/28/19 ( not ready for discharge ).  If any questions or concerns please call [phone].     Best Regards,        Charissa Nuno MD

## 2019-07-29 VITALS
HEART RATE: 80 BPM | SYSTOLIC BLOOD PRESSURE: 139 MMHG | RESPIRATION RATE: 18 BRPM | TEMPERATURE: 98 F | DIASTOLIC BLOOD PRESSURE: 78 MMHG | OXYGEN SATURATION: 95 %

## 2019-07-29 LAB
CULTURE RESULTS: SIGNIFICANT CHANGE UP
SPECIMEN SOURCE: SIGNIFICANT CHANGE UP

## 2019-07-29 PROCEDURE — 99239 HOSP IP/OBS DSCHRG MGMT >30: CPT

## 2019-07-29 PROCEDURE — 99231 SBSQ HOSP IP/OBS SF/LOW 25: CPT

## 2019-07-29 RX ORDER — AZITHROMYCIN 500 MG/1
1 TABLET, FILM COATED ORAL
Qty: 7 | Refills: 0
Start: 2019-07-29 | End: 2019-08-04

## 2019-07-29 RX ORDER — ALBUTEROL 90 UG/1
2 AEROSOL, METERED ORAL
Qty: 15 | Refills: 0
Start: 2019-07-29 | End: 2019-08-12

## 2019-07-29 RX ORDER — NICOTINE POLACRILEX 2 MG
1 GUM BUCCAL
Qty: 14 | Refills: 0
Start: 2019-07-29 | End: 2019-08-11

## 2019-07-29 RX ADMIN — PANTOPRAZOLE SODIUM 40 MILLIGRAM(S): 20 TABLET, DELAYED RELEASE ORAL at 08:59

## 2019-07-29 RX ADMIN — AZITHROMYCIN 500 MILLIGRAM(S): 500 TABLET, FILM COATED ORAL at 11:29

## 2019-07-29 RX ADMIN — Medication 3 MILLILITER(S): at 06:08

## 2019-07-29 NOTE — PROGRESS NOTE ADULT - REASON FOR ADMISSION
Pneumonia
Respiratory failure
Respiratory failure.
Pneumonia
Pneumonia/diarrhea
PNA

## 2019-07-29 NOTE — PROGRESS NOTE ADULT - PROBLEM SELECTOR PROBLEM 1
Diarrhea, unspecified type
Legionella pneumonia
Sepsis, due to unspecified organism
Legionella pneumonia
Legionella pneumonia

## 2019-07-29 NOTE — DISCHARGE NOTE PROVIDER - PROVIDER TOKENS
FREE:[LAST:[Sury],FIRST:[Vas],PHONE:[(   )    -],FAX:[(   )    -],ADDRESS:[[address] [place] [address]]] FREE:[LAST:[Vas],FIRST:[Sury],PHONE:[(   )    -],FAX:[(   )    -],ADDRESS:[[address] [place] [address]]]

## 2019-07-29 NOTE — PROGRESS NOTE ADULT - PROBLEM SELECTOR PROBLEM 4
ARDS (adult respiratory distress syndrome)
Diarrhea, unspecified type
STAN (acute kidney injury)
Diarrhea of infectious origin

## 2019-07-29 NOTE — DISCHARGE NOTE PROVIDER - CARE PROVIDER_API CALL
Sury, Vas  [address] [facility]  Phone: (   )    -  Fax: (   )    -  Follow Up Time: Sury Alvarado  [address] [place] [address]  Phone: (   )    -  Fax: (   )    -  Follow Up Time:

## 2019-07-29 NOTE — PROGRESS NOTE ADULT - PROBLEM SELECTOR PLAN 4
on vent   management per ICU
- IVF  - stool studies.  - start flagyl PO.  - GI eval
sec to antibiotics /septic shock   cr cl stable   off vanco and stopping meropenem today
sec to antibiotics /septic shock   resolved
sec to antibiotics /septic shock   resolved
resolving   sec to legionella   all other infectious w/u neg

## 2019-07-29 NOTE — PROGRESS NOTE ADULT - PROBLEM SELECTOR PROBLEM 3
Acute respiratory failure with hypoxia
Diarrhea of infectious origin
Fever
ARDS (adult respiratory distress syndrome)

## 2019-07-29 NOTE — PROGRESS NOTE ADULT - PROBLEM SELECTOR PLAN 3
on vent
- as above  - blood and urine cult
resolved   sec to legionella   all other infectious w/u neg
resolving   sec to legionella   all other infectious w/u neg
resolving   sec to legionella   all other infectious w/u neg
on vent   management per ICU

## 2019-07-29 NOTE — DISCHARGE NOTE NURSING/CASE MANAGEMENT/SOCIAL WORK - NSDCDPATPORTLINK_GEN_ALL_CORE
You can access the [facility] Patient Portal, offered by [facility], by registering with the following website: [email or web address]

## 2019-07-29 NOTE — PROGRESS NOTE ADULT - SUBJECTIVE AND OBJECTIVE BOX
Patient is a 33y old  Male who presents with a chief complaint of Pneumonia/diarrhea (26 Jul 2019 17:06)      INTERVAL HPI / OVERNIGHT EVENTS: doing better though was still requiring oxygen over the weekend    MEDICATIONS  (STANDING):  azithromycin   Tablet 500 milliGRAM(s) Oral daily  dextrose 5%. 1000 milliLiter(s) (50 mL/Hr) IV Continuous <Continuous>  dextrose 50% Injectable 12.5 Gram(s) IV Push once  dextrose 50% Injectable 25 Gram(s) IV Push once  dextrose 50% Injectable 25 Gram(s) IV Push once  enoxaparin Injectable 40 milliGRAM(s) SubCutaneous daily  insulin lispro (HumaLOG) corrective regimen sliding scale   SubCutaneous every 6 hours  nicotine -  14 mG/24Hr(s) Patch 1 patch Transdermal daily  pantoprazole    Tablet 40 milliGRAM(s) Oral before breakfast    MEDICATIONS  (PRN):  acetaminophen   Tablet .. 650 milliGRAM(s) Oral every 6 hours PRN Temp greater or equal to 38C (100.4F), Mild Pain (1 - 3)  ALBUTerol/ipratropium for Nebulization 3 milliLiter(s) Nebulizer every 6 hours PRN Shortness of Breath  dextrose 40% Gel 15 Gram(s) Oral once PRN Blood Glucose LESS THAN 70 milliGRAM(s)/deciliter  glucagon  Injectable 1 milliGRAM(s) IntraMuscular once PRN Glucose LESS THAN 70 milligrams/deciliter  guaiFENesin    Syrup 200 milliGRAM(s) Oral every 6 hours PRN Cough      Vital Signs Last 24 Hrs  T(C): 36.6 (29 Jul 2019 12:14), Max: 36.6 (29 Jul 2019 12:14)  T(F): 97.9 (29 Jul 2019 12:14), Max: 97.9 (29 Jul 2019 12:14)  HR: 81 (29 Jul 2019 12:14) (80 - 90)  BP: 113/82 (29 Jul 2019 12:14) (113/82 - 147/86)  BP(mean): --  RR: 18 (29 Jul 2019 12:14) (18 - 20)  SpO2: 96% (29 Jul 2019 12:48) (93% - 97%)    Review of systems:  General : no fever /chills, fatigue  CVS : no chest pain, palpitations  Lungs : no shortness of breath, cough  GI : no abdominal pain,vomiting, diarrhea   : no dysuria,hematuria        PHYSICAL EXAM:  General :NAD  Constitutional:  well-groomed, well-developed  Respiratory: CTAB/L  Cardiovascular: S1 and S2, RRR, no M/G/R  Gastrointestinal: BS+, soft, NT/ND  Extremities: No peripheral edema  Vascular: 2+ peripheral pulses  Skin: No rashes      LABS:                        10.0   8.58  )-----------( 295      ( 28 Jul 2019 08:06 )             31.1     07-28    142  |  101  |  24<H>  ----------------------------<  86  3.2<L>   |  34<H>  |  1.21    Ca    7.9<L>      28 Jul 2019 08:06  Phos  3.1     07-28  Mg     2.6     07-28            MICROBIOLOGY:  RECENT CULTURES:        RADIOLOGY & ADDITIONAL STUDIES:

## 2019-07-29 NOTE — DISCHARGE NOTE PROVIDER - NSDCCPCAREPLAN_GEN_ALL_CORE_FT
PRINCIPAL DISCHARGE DIAGNOSIS  Diagnosis: Pneumonia  Assessment and Plan of Treatment: Legionella pneumonia -resolving, continue with antibiotics as prescribed, continue with nebs as needed.      SECONDARY DISCHARGE DIAGNOSES  Diagnosis: Sepsis  Assessment and Plan of Treatment: resolved

## 2019-07-29 NOTE — PROGRESS NOTE ADULT - PROBLEM SELECTOR PROBLEM 2
Septic shock
Acute respiratory failure with hypoxia
Pneumonia of right lower lobe due to infectious organism
Acute respiratory failure with hypoxia

## 2019-07-29 NOTE — PROGRESS NOTE ADULT - SUBJECTIVE AND OBJECTIVE BOX
INTERVAL HPI:  33 years old male with no significant medical history here c/o fever nausea and non-bloody diarrhea with diffused abd pain for one day. Started about 4 days prior to that with right lower chest, back and knee pain along with profuse diarrhea. Also with diffuse body ache, sweating and chills. Then started with sob.  Admitted with RLL pneumonia.  07/20/19: With increasing Respiratory distress, temperature up to 104 with chills. RRT was called, got started on BIPAP support with some improvement.   Active smoker 1 ppd. uses alcohol and Marijuana socially.  07/21/19:  Transferred to ICU due to worsening Respiratory status.  Got intubated, on vent with high FIO2 and peep.  07/22/19: Legionella urine antigen positive.  07/25/19: Extubated.  07/26/19: Transferred out of ICU.    OVERNIGHT EVENTS:  Feels much better.    Vital Signs Last 24 Hrs  T(C): 36.8 (29 Jul 2019 16:21), Max: 36.8 (29 Jul 2019 16:21)  T(F): 98.3 (29 Jul 2019 16:21), Max: 98.3 (29 Jul 2019 16:21)  HR: 80 (29 Jul 2019 16:21) (80 - 88)  BP: 139/78 (29 Jul 2019 16:21) (113/82 - 147/86)  BP(mean): --  RR: 18 (29 Jul 2019 16:21) (18 - 20)  SpO2: 95% (29 Jul 2019 16:21) (95% - 97%)    PHYSICAL EXAM:  GEN:         Awake, responsive and comfortable.  HEENT:    Normal.    RESP:        no wheezing.  CVS:           Regular rate and rhythm.   ABD:         Soft, non-tender, non-distended;     MEDICATIONS  (STANDING):  azithromycin   Tablet 500 milliGRAM(s) Oral daily  dextrose 5%. 1000 milliLiter(s) (50 mL/Hr) IV Continuous <Continuous>  dextrose 50% Injectable 12.5 Gram(s) IV Push once  dextrose 50% Injectable 25 Gram(s) IV Push once  dextrose 50% Injectable 25 Gram(s) IV Push once  enoxaparin Injectable 40 milliGRAM(s) SubCutaneous daily  insulin lispro (HumaLOG) corrective regimen sliding scale   SubCutaneous every 6 hours  nicotine -  14 mG/24Hr(s) Patch 1 patch Transdermal daily  pantoprazole    Tablet 40 milliGRAM(s) Oral before breakfast    MEDICATIONS  (PRN):  acetaminophen   Tablet .. 650 milliGRAM(s) Oral every 6 hours PRN Temp greater or equal to 38C (100.4F), Mild Pain (1 - 3)  ALBUTerol/ipratropium for Nebulization 3 milliLiter(s) Nebulizer every 6 hours PRN Shortness of Breath  dextrose 40% Gel 15 Gram(s) Oral once PRN Blood Glucose LESS THAN 70 milliGRAM(s)/deciliter  glucagon  Injectable 1 milliGRAM(s) IntraMuscular once PRN Glucose LESS THAN 70 milligrams/deciliter  guaiFENesin    Syrup 200 milliGRAM(s) Oral every 6 hours PRN Cough    LABS:                        10.0   8.58  )-----------( 295      ( 28 Jul 2019 08:06 )             31.1     07-28    142  |  101  |  24<H>  ----------------------------<  86  3.2<L>   |  34<H>  |  1.21    Ca    7.9<L>      28 Jul 2019 08:06  Phos  3.1     07-28  Mg     2.6     07-28    07-25 @ 10:28  pH: 7.43  pCO2: 49  pO2: 111  SaO2: 98  07-24 @ 08:36  pH: 7.35  pCO2: 54  pO2: 80  SaO2: 95  07-23 @ 11:57  pH: 7.37  pCO2: 43  pO2: 111  SaO2: 98  07-23 @ 07:54  pH: 7.45  pCO2: 34  pO2: 115  SaO2: 99  07-22 @ 20:04  pH: 7.41  pCO2: 39  pO2: 78  SaO2: 96    ASSESSMENT AND PLAN:  ·	Acute Hypoxic Respiratory failure.  ·	Legionella Pneumonia.  ·	ARDS.  ·	Sepsis.  ·	Diarrhea.  ·	Hypokalemia better.  ·	Tobacco abuse.  ·	Obesity.    Pulmonary improved and stable.  Follow up chest CT in 6-8 weeks.

## 2019-07-29 NOTE — DISCHARGE NOTE NURSING/CASE MANAGEMENT/SOCIAL WORK - NSDCPEWEB_GEN_ALL_CORE
[place] website --- [email or web address] for Tobacco [email or web address] website --- [email or web address]

## 2019-07-29 NOTE — DISCHARGE NOTE NURSING/CASE MANAGEMENT/SOCIAL WORK - NSDCPEEMAIL_GEN_ALL_CORE
[facility] for Tobacco Control email [email or web address]

## 2019-07-29 NOTE — PROGRESS NOTE ADULT - SUBJECTIVE AND OBJECTIVE BOX
HPI:  33 years old male here c/o fever nausea and non-bloody diarrhea with diffused abd pain since yesterday. Pt denies headache, dizziness, blurred vision, light sensitivities, focal/distal weakness or numbness, neck/back pain, cough, sob, chest pain, vomiting, dysuria, hematuria or irregular bowel movements. Pt also denies recent traveling.   CXR showed RLL infiltrates, Abd CT revealed RLL PNA, he was started on IVF, IV antibiotics. (19 Jul 2019 18:39)  T(C): 36.6 (07-29-19 @ 12:14), Max: 36.6 (07-29-19 @ 12:14)  HR: 81 (07-29-19 @ 12:14) (81 - 85)  BP: 113/82 (07-29-19 @ 12:14) (113/82 - 113/82)  RR: 18 (07-29-19 @ 12:14) (18 - 20)  SpO2: 96% (07-29-19 @ 12:48) (95% - 96%)      MEDICATIONS  (STANDING):  azithromycin   Tablet 500 milliGRAM(s) Oral daily  dextrose 5%. 1000 milliLiter(s) (50 mL/Hr) IV Continuous <Continuous>  dextrose 50% Injectable 12.5 Gram(s) IV Push once  dextrose 50% Injectable 25 Gram(s) IV Push once  dextrose 50% Injectable 25 Gram(s) IV Push once  enoxaparin Injectable 40 milliGRAM(s) SubCutaneous daily  insulin lispro (HumaLOG) corrective regimen sliding scale   SubCutaneous every 6 hours  nicotine -  14 mG/24Hr(s) Patch 1 patch Transdermal daily  pantoprazole    Tablet 40 milliGRAM(s) Oral before breakfast    MEDICATIONS  (PRN):  acetaminophen   Tablet .. 650 milliGRAM(s) Oral every 6 hours PRN Temp greater or equal to 38C (100.4F), Mild Pain (1 - 3)  ALBUTerol/ipratropium for Nebulization 3 milliLiter(s) Nebulizer every 6 hours PRN Shortness of Breath  dextrose 40% Gel 15 Gram(s) Oral once PRN Blood Glucose LESS THAN 70 milliGRAM(s)/deciliter  glucagon  Injectable 1 milliGRAM(s) IntraMuscular once PRN Glucose LESS THAN 70 milligrams/deciliter  guaiFENesin    Syrup 200 milliGRAM(s) Oral every 6 hours PRN Cough    GENERAL: NAD, well-groomed, well-developed  HEAD:  Atraumatic, Normocephalic  EYES: EOMI, PERRLA, conjunctiva and sclera clear  ENMT: No tonsillar erythema, exudates, or enlargement; Moist mucous membranes, Good dentition, No lesions  NECK: Supple, No JVD, Normal thyroid  NERVOUS SYSTEM:  Alert & Oriented X3, Good concentration; Motor Strength 5/5 B/L upper and lower extremities; DTRs 2+ intact and symmetric  CHEST/LUNG: Clear to percussion bilaterally; No rales, rhonchi, wheezing, or rubs  HEART: Regular rate and rhythm; No murmurs, rubs, or gallops  ABDOMEN: Soft, Nontender, Nondistended; Bowel sounds present  EXTREMITIES:  2+ Peripheral Pulses, No clubbing, cyanosis, or edema  SKIN: No rashes or lesions                                   10.0   8.58  )-----------( 295      ( 28 Jul 2019 08:06 )             31.1

## 2019-07-29 NOTE — PROGRESS NOTE ADULT - PROBLEM SELECTOR PLAN 1
- blood and urine culture.  - IVF hydration  - IV antibiotics.  - pulmonary eval  - lactic acid - is normal now.
Improving. Tolerating solid diet. Stool studies negative up to now
RESOLVED Stool studies negative up to now
RESOLVED Stool studies negative up to now. Tolerating feeds. Will follow on PRN basis
cont Levaquin and azithro ,can switch to oral in am   resolved septic shock and acute resp failure /ARDS  d/c meropenem (empiric gram neg coverage day 6 )  all blood c/s neg  bronch c/s neg
cont Levaquin and azithro ,can switch to oral today  By monday if cont to improve change dual to monotherpay with azithro only  resolved septic shock and acute resp failure /ARDS  d/brian meropenem
gavi boland for one more week  d/c andrea  check Pulse ox to make sure he is not hypoxic on d/c from hospital   pt to call me next week to discuss status
cont Levaquin and azithro   resolved septic shock  all blood c/s neg  cont meropenem for now ,may dc soon  bronch c/s neg
cont Levaquin and azithro   fever resolving   mild leukocytosis  all blood c/s neg  cont meropenem for now ,may dc soon  bronch c/s neg   d/c  vanco as no MRSA in c/s and now pt has STAN  legionella c/s ordered   Per core lab Legionella PCR not done on sputum at this time in our lab

## 2019-07-29 NOTE — PROGRESS NOTE ADULT - ASSESSMENT
34 yo M p/w PNA, ARDS and currently intubated and in prone positioning. Now + for legionella Ag. Slowly improving w/ clearing CXR and decreasing vent parameters. Doing well postextubation on HFNC. Pt stabilized and planned for transfer.     Neuro  off sedation which was used for vent mamanagment    Pulm  ARDS from legionella PNA  s/p resp failure s/p extubation.    continue with antibx per OD reccs   and pulmonary care per pulm .  f/u BAL cx and cell count -c/w neutrophilic alveolitis  f/u other autoimmune w/u and fungal w/u - so far not significantly positive  pt also utox +ve for THC and family reports cocaine use so unclear if these are contributing etiologies  HIV negative    with ambulation on room air oxygen saturation is 93 %  -d/c planning today   [facility]  hemodynamically stable    Renal  STAN resolved      GI  advance diet     tobacco use  d/o: counseled      marijuana use counseled

## 2019-07-29 NOTE — DISCHARGE NOTE PROVIDER - HOSPITAL COURSE
admitted to MICU s/p intubation for ARDS from Legionella Pneumonia requiring vasopressors.     Patient transferred to floor after extubation.    Patient currently ambulating well of Oxygen.     Discharge patient on Zithromax x 7 more days to complete antibiotic course. 33 M patient admitted to MICU s/p intubation for ARDS from Legionella Pneumonia requiring vasopressors, transferred to floor after extubation.    Patient currently ambulating well of Oxygen.     Discharge patient on Zithromax x 7 more days to complete antibiotic course.     CDI Query- Addendum        33 M presents with fever, nausea, non bloody diarrhea- T 103 F, , Creatinine trended from 1.54 to 0.95, during stay patient developed ARDS. 33 M patient admitted to MICU s/p intubation for ARDS from Legionella Pneumonia requiring vasopressors, transferred to floor after extubation.    Patient currently ambulating well of Oxygen.     Discharge patient on Zithromax x 7 more days to complete antibiotic course.     CDI Query- Addendum        33 M presents with fever, nausea, non bloody diarrhea- T 103 F, , sepsis form Legionella pneumonia, STAN from sepsis-Creatinine trended from 1.54 to 0.95, intubated transferred to MICU, developed ARDS during hospital stay.

## 2019-07-31 LAB
HTLV I+II AB PATRN SER RIPA-IMP: SIGNIFICANT CHANGE UP
VIRUS SPEC CULT: SIGNIFICANT CHANGE UP

## 2019-08-05 DIAGNOSIS — A41.9 SEPSIS, UNSPECIFIED ORGANISM: ICD-10-CM

## 2019-08-05 DIAGNOSIS — E86.0 DEHYDRATION: ICD-10-CM

## 2019-08-05 DIAGNOSIS — R65.20 SEVERE SEPSIS WITHOUT SEPTIC SHOCK: ICD-10-CM

## 2019-08-05 DIAGNOSIS — F17.210 NICOTINE DEPENDENCE, CIGARETTES, UNCOMPLICATED: ICD-10-CM

## 2019-08-05 DIAGNOSIS — A41.89 OTHER SPECIFIED SEPSIS: ICD-10-CM

## 2019-08-05 DIAGNOSIS — N17.9 ACUTE KIDNEY FAILURE, UNSPECIFIED: ICD-10-CM

## 2019-08-05 DIAGNOSIS — F12.99 CANNABIS USE, UNSPECIFIED WITH UNSPECIFIED CANNABIS-INDUCED DISORDER: ICD-10-CM

## 2019-08-05 DIAGNOSIS — E87.6 HYPOKALEMIA: ICD-10-CM

## 2019-08-05 DIAGNOSIS — R19.7 DIARRHEA, UNSPECIFIED: ICD-10-CM

## 2019-08-05 DIAGNOSIS — A48.1 LEGIONNAIRES' DISEASE: ICD-10-CM

## 2019-08-05 DIAGNOSIS — J96.01 ACUTE RESPIRATORY FAILURE WITH HYPOXIA: ICD-10-CM

## 2019-08-22 LAB
CULTURE RESULTS: SIGNIFICANT CHANGE UP
SPECIMEN SOURCE: SIGNIFICANT CHANGE UP

## 2019-09-11 LAB
CULTURE RESULTS: SIGNIFICANT CHANGE UP
NIGHT BLUE STAIN TISS: SIGNIFICANT CHANGE UP
SPECIMEN SOURCE: SIGNIFICANT CHANGE UP

## 2020-08-02 NOTE — PROVIDER CONTACT NOTE (EICU) - ASSESSMENT
CT chest showed bilateral diffuse infiltrates
RLL consolidation   resp failure   increase D dimer
Patient requests all Lab and Radiology Results on their Discharge Instructions

## 2021-09-03 NOTE — PROVIDER CONTACT NOTE (CRITICAL VALUE NOTIFICATION) - DATE AND TIME:
26-Jul-2019 05:21 26-Jul-2019 05:23 Number Of Freeze-Thaw Cycles: 3 freeze-thaw cycles Render Post-Care Instructions In Note?: yes Render Note In Bullet Format When Appropriate: No Consent: The patient's consent was obtained including but not limited to risks of crusting, scabbing, blistering, scarring, darker or lighter pigmentary change, recurrence, incomplete removal and infection. Duration Of Freeze Thaw-Cycle (Seconds): 2 Post-Care Instructions: I reviewed with the patient in detail post-care instructions. Patient is to wear sunprotection, and avoid picking at any of the treated lesions. Pt may apply Vaseline to crusted or scabbing areas. Detail Level: Zone

## 2022-07-12 NOTE — PATIENT PROFILE ADULT - DO YOU FEEL LIKE HURTING OTHERS?
normal appearance , without tenderness upon palpation , no deformities , trachea midline , Thyroid normal size , no thyroid nodules , no masses , no JVD , thyroid nontender no